# Patient Record
Sex: FEMALE | Race: WHITE | Employment: FULL TIME | URBAN - METROPOLITAN AREA
[De-identification: names, ages, dates, MRNs, and addresses within clinical notes are randomized per-mention and may not be internally consistent; named-entity substitution may affect disease eponyms.]

---

## 2020-01-20 ENCOUNTER — OFFICE VISIT (OUTPATIENT)
Dept: FAMILY MEDICINE CLINIC | Age: 30
End: 2020-01-20
Payer: OTHER GOVERNMENT

## 2020-01-20 VITALS
OXYGEN SATURATION: 99 % | HEIGHT: 66 IN | HEART RATE: 92 BPM | DIASTOLIC BLOOD PRESSURE: 60 MMHG | BODY MASS INDEX: 20.89 KG/M2 | SYSTOLIC BLOOD PRESSURE: 110 MMHG | WEIGHT: 130 LBS

## 2020-01-20 PROBLEM — K11.1 PAROTID GLAND ENLARGEMENT: Status: ACTIVE | Noted: 2018-12-07

## 2020-01-20 PROBLEM — Z28.21 VACCINATION NOT CARRIED OUT BECAUSE OF PATIENT REFUSAL: Status: ACTIVE | Noted: 2020-01-20

## 2020-01-20 PROBLEM — Z28.21 VACCINATION NOT CARRIED OUT BECAUSE OF PATIENT REFUSAL: Status: RESOLVED | Noted: 2020-01-20 | Resolved: 2020-01-20

## 2020-01-20 PROBLEM — H43.823 VITREOMACULAR ADHESION OF BOTH EYES: Status: ACTIVE | Noted: 2020-01-20

## 2020-01-20 PROBLEM — L60.0 IGTN (INGROWING TOE NAIL): Status: ACTIVE | Noted: 2020-01-20

## 2020-01-20 PROBLEM — R89.4 SEROLOGIC ABNORMALITY: Status: ACTIVE | Noted: 2018-12-07

## 2020-01-20 PROBLEM — M35.00 SJOGREN'S SYNDROME (HCC): Status: ACTIVE | Noted: 2020-01-20

## 2020-01-20 PROBLEM — H35.373 EPIRETINAL MEMBRANE (ERM) OF BOTH EYES: Status: ACTIVE | Noted: 2020-01-20

## 2020-01-20 PROBLEM — Z79.899 LONG-TERM USE OF PLAQUENIL: Status: ACTIVE | Noted: 2020-01-20

## 2020-01-20 PROCEDURE — 99203 OFFICE O/P NEW LOW 30 MIN: CPT | Performed by: FAMILY MEDICINE

## 2020-01-20 SDOH — HEALTH STABILITY: MENTAL HEALTH: HOW OFTEN DO YOU HAVE A DRINK CONTAINING ALCOHOL?: NEVER

## 2020-01-20 ASSESSMENT — ENCOUNTER SYMPTOMS
CONSTIPATION: 0
NAUSEA: 0
TROUBLE SWALLOWING: 0
BACK PAIN: 0
SHORTNESS OF BREATH: 0
BLOOD IN STOOL: 0
VOMITING: 0
ABDOMINAL PAIN: 0
COLOR CHANGE: 0
DIARRHEA: 0
COUGH: 0

## 2020-01-20 ASSESSMENT — PATIENT HEALTH QUESTIONNAIRE - PHQ9
SUM OF ALL RESPONSES TO PHQ QUESTIONS 1-9: 1
5. POOR APPETITE OR OVEREATING: 0
SUM OF ALL RESPONSES TO PHQ9 QUESTIONS 1 & 2: 0
7. TROUBLE CONCENTRATING ON THINGS, SUCH AS READING THE NEWSPAPER OR WATCHING TELEVISION: 0
9. THOUGHTS THAT YOU WOULD BE BETTER OFF DEAD, OR OF HURTING YOURSELF: 0
1. LITTLE INTEREST OR PLEASURE IN DOING THINGS: 0
4. FEELING TIRED OR HAVING LITTLE ENERGY: 1
10. IF YOU CHECKED OFF ANY PROBLEMS, HOW DIFFICULT HAVE THESE PROBLEMS MADE IT FOR YOU TO DO YOUR WORK, TAKE CARE OF THINGS AT HOME, OR GET ALONG WITH OTHER PEOPLE: 0
6. FEELING BAD ABOUT YOURSELF - OR THAT YOU ARE A FAILURE OR HAVE LET YOURSELF OR YOUR FAMILY DOWN: 0
SUM OF ALL RESPONSES TO PHQ QUESTIONS 1-9: 1
8. MOVING OR SPEAKING SO SLOWLY THAT OTHER PEOPLE COULD HAVE NOTICED. OR THE OPPOSITE, BEING SO FIGETY OR RESTLESS THAT YOU HAVE BEEN MOVING AROUND A LOT MORE THAN USUAL: 0
3. TROUBLE FALLING OR STAYING ASLEEP: 0
2. FEELING DOWN, DEPRESSED OR HOPELESS: 0

## 2020-01-20 ASSESSMENT — ANXIETY QUESTIONNAIRES
2. NOT BEING ABLE TO STOP OR CONTROL WORRYING: 0-NOT AT ALL
7. FEELING AFRAID AS IF SOMETHING AWFUL MIGHT HAPPEN: 0-NOT AT ALL
4. TROUBLE RELAXING: 0-NOT AT ALL
6. BECOMING EASILY ANNOYED OR IRRITABLE: 1-SEVERAL DAYS
3. WORRYING TOO MUCH ABOUT DIFFERENT THINGS: 1-SEVERAL DAYS
5. BEING SO RESTLESS THAT IT IS HARD TO SIT STILL: 0-NOT AT ALL
GAD7 TOTAL SCORE: 3
1. FEELING NERVOUS, ANXIOUS, OR ON EDGE: 1-SEVERAL DAYS

## 2020-01-20 NOTE — PROGRESS NOTES
1/20/2020    This is a 30 y.o. female who presents for  Chief Complaint   Patient presents with    New Patient    Establish Care       HPI:     4and 2year old, boy and girl, vaginal, GDM with second   Marcela Chung is her mother     Hx of Sjogren, Dx'd from Parotiditis , Rheumatologist in [facility] (horrible experience)   Iron was high in her lab work only  Then went to Rheumatology in [facility] [place]    Was on Plaquenil for a while, +WG, foggy, worsening sadness  Missed her last appmnt, as he \"was too pushy\"   S/p Lip biopsy   [facility]   + eye dryness, throat is the worse  Eye drops at night, hoarse voice and is a michelle     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   Rotates fine, had XRs   Dad is a chiropractor     Has never received any vaccines, even as child     + ingrown toe nails  S/p partial removal   Continued pain and irritated with regrowth     Past Medical History:   Diagnosis Date    Gestational diabetes 2018    Sjogren's syndrome ([facility] [address]        Past Surgical History:   Procedure Laterality Date    WISDOM TOOTH EXTRACTION  2008       Social History     Socioeconomic History    Marital status:      Spouse name: Not on file    Number of children: Not on file    Years of education: Not on file    Highest education level: Not on file   Occupational History    Not on file   Social Needs    Financial resource strain: Not on file    Food insecurity:     Worry: Not on file     Inability: Not on file    Transportation needs:     Medical: Not on file     Non-medical: Not on file   Tobacco Use    Smoking status: Never Smoker    Smokeless tobacco: Never Used   Substance and Sexual Activity    Alcohol use: Never     Frequency: Never    Drug use: Never    Sexual activity: Yes     Partners: Male     Comment:    Lifestyle    Physical activity:     Days per week: Not on file     Minutes per session: Not on file    Stress: Not on file   Relationships    Social connections:     Talks on phone: Not on file Musculoskeletal: Positive for arthralgias. Negative for back pain. Skin: Negative for color change and rash. Neurological: Negative for dizziness, weakness, light-headedness, numbness and headaches. Psychiatric/Behavioral: Negative for dysphoric mood and sleep disturbance. The patient is not nervous/anxious. /60 (Site: Left Upper Arm, Position: Sitting, Cuff Size: Medium Adult)   Pulse 92   Ht 5' 6\" (1.676 m)   Wt 130 lb (59 kg)   LMP 12/23/2019 (Exact Date)   SpO2 99%   BMI 20.98 kg/m²     Physical Exam  Vitals signs and nursing note reviewed. Constitutional:       General: She is not in acute distress. Appearance: She is well-developed. She is not diaphoretic. HENT:      Head: Normocephalic and atraumatic. Right Ear: External ear normal.      Left Ear: External ear normal.      Mouth/Throat:      Pharynx: No oropharyngeal exudate. Eyes:      General:         Right eye: No discharge. Left eye: No discharge. Conjunctiva/sclera: Conjunctivae normal.      Pupils: Pupils are equal, round, and reactive to light. Neck:      Musculoskeletal: Normal range of motion and neck supple. Thyroid: No thyromegaly. Cardiovascular:      Rate and Rhythm: Normal rate and regular rhythm. Heart sounds: Normal heart sounds. No murmur. No friction rub. No gallop. Pulmonary:      Effort: Pulmonary effort is normal. No respiratory distress. Breath sounds: Normal breath sounds. No wheezing or rales. Abdominal:      General: Bowel sounds are normal. There is no distension. Palpations: Abdomen is soft. Tenderness: There is no tenderness. There is no guarding or rebound. Musculoskeletal: Normal range of motion. Lymphadenopathy:      Cervical: No cervical adenopathy. Skin:     General: Skin is warm and dry. Coloration: Skin is not pale. Findings: No erythema or rash. Neurological:      Mental Status: She is alert. Cranial Nerves:  No

## 2020-01-20 NOTE — PATIENT INSTRUCTIONS
Patient Education        Learning About Cervical Cancer Screening  What is a cervical cancer screening test?    The cervix is the lower part of the uterus that opens into the vagina. Cervical cancer screening tests check the cells on the cervix for changes that could lead to cancer. Two tests can be used to screen for cervical cancer. They may be used alone or together. A Pap test.   This test looks for changes in the cells of the cervix. Some of these cell changes could lead to cancer. A human papillomavirus (HPV) test.   This test looks for the HPV virus. Some high-risk types of HPV can cause cell changes that could lead to cervical cancer. During either test, the doctor or nurse will insert a tool called a speculum into your vagina. The speculum gently opens the vaginal walls. It allows your doctor to see inside the vagina and the cervix. He or she uses a small swab or brush to collect cell samples from your cervix. Try to schedule the test when you're not having your period. To get ready for the test, your doctor may ask you to use a condom if you have sex before the test. Your doctor may also say to avoid douches, tampons, vaginal medicines, sprays, and powders for at least a day before you have the test.  When should you have a screening test?  Talk with your doctor about cervical cancer screening. If you are a woman with an average risk for cervical cancer, your doctor will likely suggest starting screening at age 24.   Women 21 to 34  If you are in this age group, your doctor will likely suggest that you get a Pap test. If your Pap test results are normal, you can wait 3 years to have another test.  Women 27 to 59  Screening options for women in this age group may include:  · A Pap test. If your results are normal, you can wait 3 years to have another test.  · An HPV test. If your results are normal, you can wait 5 years to have another test.  · A Pap test and an HPV test. Having both tests is called Up Now\" link. Current as of: August 21, 2019  Content Version: 12.3  © 2690-4256 Healthwise, Incorporated. Care instructions adapted under license by [facility] ([facility]). If you have questions about a medical condition or this instruction, always ask your healthcare professional. [address] any warranty or liability for your use of this information.

## 2020-02-17 ENCOUNTER — OFFICE VISIT (OUTPATIENT)
Dept: FAMILY MEDICINE CLINIC | Age: 30
End: 2020-02-17
Payer: OTHER GOVERNMENT

## 2020-02-17 VITALS
WEIGHT: 137 LBS | SYSTOLIC BLOOD PRESSURE: 110 MMHG | OXYGEN SATURATION: 99 % | DIASTOLIC BLOOD PRESSURE: 66 MMHG | HEART RATE: 80 BPM | BODY MASS INDEX: 22.11 KG/M2

## 2020-02-17 ASSESSMENT — ENCOUNTER SYMPTOMS
DIARRHEA: 0
ABDOMINAL PAIN: 0
SHORTNESS OF BREATH: 0
VOMITING: 0
CONSTIPATION: 0
ABDOMINAL DISTENTION: 0
BACK PAIN: 0
COLOR CHANGE: 0
NAUSEA: 0

## 2020-02-17 NOTE — PROGRESS NOTES
2/17/2020    This is a 30 y.o. female who presents for  Chief Complaint   Patient presents with    Gynecologic Exam       HPI:     Patient's last menstrual period was 01/20/2020.   Denies any vaginal discharge, new pelvic pain, dyspareunia, AUB  No new sexual partners  Defers any STD testing    Breast exam:   Denies any new lumps or bumps, skin changes, nipple drainage, new tenderness  Family history updated       Past Medical History:   Diagnosis Date    Gestational diabetes 2018    Sjogren's syndrome ([facility] [address]        Past Surgical History:   Procedure Laterality Date    WISDOM TOOTH EXTRACTION  2008       Social History     Socioeconomic History    Marital status:      Spouse name: Not on file    Number of children: Not on file    Years of education: Not on file    Highest education level: Not on file   Occupational History    Not on file   Social Needs    Financial resource strain: Not on file    Food insecurity:     Worry: Not on file     Inability: Not on file    Transportation needs:     Medical: Not on file     Non-medical: Not on file   Tobacco Use    Smoking status: Never Smoker    Smokeless tobacco: Never Used   Substance and Sexual Activity    Alcohol use: Never     Frequency: Never    Drug use: Never    Sexual activity: Yes     Partners: Male     Comment:    Lifestyle    Physical activity:     Days per week: Not on file     Minutes per session: Not on file    Stress: Not on file   Relationships    Social connections:     Talks on phone: Not on file     Gets together: Not on file     Attends Mu-ism service: Not on file     Active member of club or organization: Not on file     Attends meetings of clubs or organizations: Not on file     Relationship status: Not on file    Intimate partner violence:     Fear of current or ex partner: Not on file     Emotionally abused: Not on file     Physically abused: Not on file     Forced sexual activity: Not on file   Other Topics Appearance: She is well-developed. She is not diaphoretic. HENT:      Head: Normocephalic and atraumatic. Eyes:      Pupils: Pupils are equal, round, and reactive to light. Neck:      Musculoskeletal: Normal range of motion and neck supple. Cardiovascular:      Rate and Rhythm: Normal rate and regular rhythm. Pulmonary:      Effort: Pulmonary effort is normal. No respiratory distress. Breath sounds: Normal breath sounds. Chest:      Breasts:         Right: No swelling, bleeding, inverted nipple, mass, nipple discharge, skin change or tenderness. Left: No swelling, bleeding, inverted nipple, mass, nipple discharge, skin change or tenderness. Abdominal:      General: There is no distension. Palpations: Abdomen is soft. There is no mass. Tenderness: There is no abdominal tenderness. There is no guarding or rebound. Genitourinary:     Labia:         Right: No rash, tenderness, lesion or injury. Left: No rash, tenderness, lesion or injury. Vagina: Normal. No signs of injury and foreign body. No vaginal discharge, erythema, tenderness or bleeding. Cervix: No cervical motion tenderness, discharge or friability. Uterus: Not deviated, not enlarged, not fixed and not tender. Adnexa:         Right: No mass, tenderness or fullness. Left: No mass, tenderness or fullness. Rectum: No external hemorrhoid. Musculoskeletal: Normal range of motion. Lymphadenopathy:      Upper Body:      Right upper body: No axillary adenopathy. Left upper body: No axillary adenopathy. Skin:     General: Skin is warm and dry. Coloration: Skin is not pale. Findings: No erythema or rash. Neurological:      Mental Status: She is alert. Psychiatric:         Behavior: Behavior normal.         Thought Content: Thought content normal.         Judgment: Judgment normal.         Plan  1.  Cervical cancer screening  - PAP SMEAR        While assessing care for this patient, I have reviewed all pertinent lab work/imaging/ specialist notes and care in reference to those problems addressed above in detail. Appropriate medical decision making was based on this. Please note that portions of this note may have been completed with a voice recognition program. Efforts were made to edit the dictations but occasionally words are mis-transcribed. Return in about 3 months (around 5/17/2020) for 30 minute visit, follow up labs.

## 2020-02-17 NOTE — PATIENT INSTRUCTIONS
Patient Education        Learning About Cervical Cancer Screening  What is a cervical cancer screening test?    The cervix is the lower part of the uterus that opens into the vagina. Cervical cancer screening tests check the cells on the cervix for changes that could lead to cancer. Two tests can be used to screen for cervical cancer. They may be used alone or together. A Pap test.   This test looks for changes in the cells of the cervix. Some of these cell changes could lead to cancer. A human papillomavirus (HPV) test.   This test looks for the HPV virus. Some high-risk types of HPV can cause cell changes that could lead to cervical cancer. During either test, the doctor or nurse will insert a tool called a speculum into your vagina. The speculum gently opens the vaginal walls. It allows your doctor to see inside the vagina and the cervix. He or she uses a small swab or brush to collect cell samples from your cervix. Try to schedule the test when you're not having your period. To get ready for the test, your doctor may ask you to use a condom if you have sex before the test. Your doctor may also say to avoid douches, tampons, vaginal medicines, sprays, and powders for at least a day before you have the test.  When should you have a screening test?  Talk with your doctor about cervical cancer screening. If you are a woman with an average risk for cervical cancer, your doctor will likely suggest starting screening at age 24.   Women 21 to 34  If you are in this age group, your doctor will likely suggest that you get a Pap test. If your Pap test results are normal, you can wait 3 years to have another test.  Women 27 to 59  Screening options for women in this age group may include:  · A Pap test. If your results are normal, you can wait 3 years to have another test.  · An HPV test. If your results are normal, you can wait 5 years to have another test.  · A Pap test and an HPV test. Having both tests is called

## 2020-02-27 ENCOUNTER — TELEPHONE (OUTPATIENT)
Dept: FAMILY MEDICINE CLINIC | Age: 30
End: 2020-02-27

## 2020-03-21 ENCOUNTER — TELEPHONE (OUTPATIENT)
Dept: FAMILY MEDICINE CLINIC | Age: 30
End: 2020-03-21

## 2020-03-21 RX ORDER — CEFDINIR 300 MG/1
300 CAPSULE ORAL 2 TIMES DAILY
Qty: 14 CAPSULE | Refills: 0 | Status: SHIPPED | OUTPATIENT
Start: 2020-03-21 | End: 2020-03-28

## 2020-06-02 ENCOUNTER — NURSE TRIAGE (OUTPATIENT)
Dept: OTHER | Facility: CLINIC | Age: 30
End: 2020-06-02

## 2020-06-03 ENCOUNTER — HOSPITAL ENCOUNTER (OUTPATIENT)
Age: 30
Discharge: HOME | End: 2020-06-03
Payer: OTHER GOVERNMENT

## 2020-06-03 ENCOUNTER — VIRTUAL VISIT (OUTPATIENT)
Dept: FAMILY MEDICINE CLINIC | Age: 30
End: 2020-06-03
Payer: OTHER GOVERNMENT

## 2020-06-03 ENCOUNTER — HOSPITAL ENCOUNTER (OUTPATIENT)
Dept: GENERAL RADIOLOGY | Age: 30
Discharge: HOME | End: 2020-06-03
Payer: OTHER GOVERNMENT

## 2020-06-03 DIAGNOSIS — R10.10 PAIN OF UPPER ABDOMEN: ICD-10-CM

## 2020-06-03 LAB
A/G RATIO: 1.3 (ref 1.1–2.2)
ALBUMIN SERPL-MCNC: 4.7 G/DL (ref 3.4–5)
ALP BLD-CCNC: 45 U/L (ref 40–129)
ALT SERPL-CCNC: 23 U/L (ref 10–40)
ANION GAP SERPL CALCULATED.3IONS-SCNC: 13 MMOL/L (ref 3–16)
AST SERPL-CCNC: 26 U/L (ref 15–37)
BILIRUB SERPL-MCNC: 0.3 MG/DL (ref 0–1)
BUN BLDV-MCNC: 9 MG/DL (ref 7–20)
CALCIUM SERPL-MCNC: 9.8 MG/DL (ref 8.3–10.6)
CHLORIDE BLD-SCNC: 100 MMOL/L (ref 99–110)
CO2: 25 MMOL/L (ref 21–32)
CREAT SERPL-MCNC: 0.7 MG/DL (ref 0.6–1.1)
GFR AFRICAN AMERICAN: >60
GFR NON-AFRICAN AMERICAN: >60
GLOBULIN: 3.7 G/DL
GLUCOSE BLD-MCNC: 87 MG/DL (ref 70–99)
HCT VFR BLD CALC: 34.5 % (ref 36–48)
HEMOGLOBIN: 11.9 G/DL (ref 12–16)
LIPASE: 23 U/L (ref 13–60)
MCH RBC QN AUTO: 30.8 PG (ref 26–34)
MCHC RBC AUTO-ENTMCNC: 34.4 G/DL (ref 31–36)
MCV RBC AUTO: 89.5 FL (ref 80–100)
PDW BLD-RTO: 12.5 % (ref 12.4–15.4)
PLATELET # BLD: 168 K/UL (ref 135–450)
PMV BLD AUTO: 9.5 FL (ref 5–10.5)
POTASSIUM SERPL-SCNC: 3.9 MMOL/L (ref 3.5–5.1)
RBC # BLD: 3.86 M/UL (ref 4–5.2)
SODIUM BLD-SCNC: 138 MMOL/L (ref 136–145)
TOTAL PROTEIN: 8.4 G/DL (ref 6.4–8.2)
WBC # BLD: 3.1 K/UL (ref 4–11)

## 2020-06-03 PROCEDURE — 99213 OFFICE O/P EST LOW 20 MIN: CPT | Performed by: PHYSICIAN ASSISTANT

## 2020-06-03 PROCEDURE — 74019 RADEX ABDOMEN 2 VIEWS: CPT

## 2020-06-03 ASSESSMENT — ENCOUNTER SYMPTOMS
CONSTIPATION: 1
ABDOMINAL PAIN: 1
ANAL BLEEDING: 0
VOMITING: 0
BACK PAIN: 1
ABDOMINAL DISTENTION: 1
DIARRHEA: 0
BLOOD IN STOOL: 0
TROUBLE SWALLOWING: 0
COUGH: 0
NAUSEA: 0
SHORTNESS OF BREATH: 0

## 2020-06-03 NOTE — PROGRESS NOTES
Amoxicillin     Pcn [Penicillins] Anaphylaxis, Itching and Swelling     Throat starts to itch and swell   ,   Past Medical History:   Diagnosis Date    Gestational diabetes 2018    Sjogren's syndrome ([facility] [address]    ,   Past Surgical History:   Procedure Laterality Date    WISDOM TOOTH EXTRACTION  2008   ,   Social History     Tobacco Use    Smoking status: Never Smoker    Smokeless tobacco: Never Used   Substance Use Topics    Alcohol use: Never     Frequency: Never    Drug use: Never       PHYSICAL EXAMINATION:  [ INSTRUCTIONS:  \"[x]\" Indicates a positive item  \"[]\" Indicates a negative item  -- DELETE ALL ITEMS NOT EXAMINED]  Vital Signs: (As obtained by patient/caregiver or practitioner observation)    Blood pressure-  Heart rate-    Respiratory rate- 14   Temperature-  Pulse oximetry-     Constitutional: [x] Appears well-developed and well-nourished [x] No apparent distress      [] Abnormal-   Mental status  [x] Alert and awake  [x] Oriented to person/place/time [x]Able to follow commands      Eyes:  EOM    []  Normal  [] Abnormal-  Sclera  [x]  Normal  [] Abnormal -         Discharge []  None visible  [] Abnormal -    HENT:   [] Normocephalic, atraumatic.   [x] Abnormal - enlarged salivary glands[x] Mouth/Throat: Mucous membranes are moist.     External Ears [] Normal  [] Abnormal-     Neck: [] No visualized mass     Pulmonary/Chest: [x] Respiratory effort normal.  [x] No visualized signs of difficulty breathing or respiratory distress        [] Abnormal-      Musculoskeletal:   [] Normal gait with no signs of ataxia         [x] Normal range of motion of neck        [] Abnormal-       Neurological:        [x] No Facial Asymmetry (Cranial nerve 7 motor function) (limited exam to video visit)          [] No gaze palsy        [] Abnormal-         Skin:        [x] No significant exanthematous lesions or discoloration noted on facial skin         [] Abnormal-            Psychiatric:       [] Normal Affect [] No Hallucinations        [] Abnormal-     Other pertinent observable physical exam findings-     ASSESSMENT/PLAN:  1. Pain of upper abdomen  -  Will order x-ray of abdomen to rule out blockage. Blood work to rule out concern for bile blockage. To the ER if pain worsens.  - COMPREHENSIVE METABOLIC PANEL; Future  - CBC; Future  - LIPASE; Future  - XR ABDOMEN (2 VIEWS); Future    2. Constipation, unspecified constipation type  -  Offered miralax, pt declined at this time due to abdominal pain. Will discuss further after imaging.   - XR ABDOMEN (2 VIEWS); Future    3. Sjogren's syndrome, with unspecified organ involvement ([facility] [address]  -  Pt is having difficulty getting into Dr. Nicci Garza and is requesting a second opinion on treatment options  - Caryl Ponce MD, Rheumatology, [facility]      No follow-ups on file. Sd Guidry is a 35 y.o. female being evaluated by a Virtual Visit (video visit) encounter to address concerns as mentioned above. A caregiver was present when appropriate. Due to this being a TeleHealth encounter (During [facility]- public health emergency), evaluation of the following organ systems was limited: Vitals/Constitutional/EENT/Resp/CV/GI//MS/Neuro/Skin/Heme-Lymph-Imm. Pursuant to the emergency declaration under the [address] [place] [address] authority and the [employer] and [employer] Act, this Virtual Visit was conducted with patient's (and/or legal guardian's) consent, to reduce the patient's risk of exposure to COVID-19 and provide necessary medical care. The patient (and/or legal guardian) has also been advised to contact this office for worsening conditions or problems, and seek emergency medical treatment and/or call 911 if deemed necessary.      Patient identification was verified at the start of the visit: Yes    Total time spent on this encounter: Not billed by time    Services were provided through a video synchronous discussion virtually to substitute for in-person clinic visit. Patient and provider were located at their individual homes. --Author CHIRAG Jolley on 6/3/2020 at 10:46 AM    An electronic signature was used to authenticate this note.

## 2020-06-08 ENCOUNTER — OFFICE VISIT (OUTPATIENT)
Dept: RHEUMATOLOGY | Age: 30
End: 2020-06-08
Payer: OTHER GOVERNMENT

## 2020-06-08 VITALS
SYSTOLIC BLOOD PRESSURE: 110 MMHG | WEIGHT: 136 LBS | TEMPERATURE: 98.4 F | HEIGHT: 66 IN | DIASTOLIC BLOOD PRESSURE: 70 MMHG | BODY MASS INDEX: 21.86 KG/M2 | OXYGEN SATURATION: 97 % | HEART RATE: 91 BPM

## 2020-06-08 PROCEDURE — 99244 OFF/OP CNSLTJ NEW/EST MOD 40: CPT | Performed by: INTERNAL MEDICINE

## 2020-06-08 RX ORDER — PILOCARPINE HYDROCHLORIDE 5 MG/1
5 TABLET, FILM COATED ORAL 3 TIMES DAILY
Qty: 90 TABLET | Refills: 2 | Status: SHIPPED | OUTPATIENT
Start: 2020-06-08 | End: 2020-11-11 | Stop reason: ALTCHOICE

## 2020-06-08 NOTE — PROGRESS NOTES
file    Transportation needs     Medical: Not on file     Non-medical: Not on file   Tobacco Use    Smoking status: Never Smoker    Smokeless tobacco: Never Used   Substance and Sexual Activity    Alcohol use: Never     Frequency: Never    Drug use: Never    Sexual activity: Yes     Partners: Male     Comment:    Lifestyle    Physical activity     Days per week: Not on file     Minutes per session: Not on file    Stress: Not on file   Relationships    Social connections     Talks on phone: Not on file     Gets together: Not on file     Attends Nondenominational service: Not on file     Active member of club or organization: Not on file     Attends meetings of clubs or organizations: Not on file     Relationship status: Not on file    Intimate partner violence     Fear of current or ex partner: Not on file     Emotionally abused: Not on file     Physically abused: Not on file     Forced sexual activity: Not on file   Other Topics Concern    Not on file   Social History Narrative    Not on file        Family History   Problem Relation Age of Onset    Diabetes Mother     Hypertension Father     No Known Problems Sister     No Known Problems Brother     Arthritis Maternal Grandmother         osteoarthritis     Heart Disease Maternal Grandfather         arrhythimia     Hypertension Maternal Grandfather     Hypertension Paternal Grandmother     Arthritis Paternal Grandmother         \"autoimmune\"     Heart Attack Paternal Grandfather         76s    No Known Problems Brother        MEDICATIONS:    Current Outpatient Medications:     pilocarpine (SALAGEN) 5 MG tablet, Take 1 tablet by mouth 3 times daily, Disp: 90 tablet, Rfl: 2    ALLERGIES:  Amoxicillin and Pcn [penicillins]    PHYSICAL EXAM:    Vitals:    /70 (Site: Right Upper Arm, Position: Sitting, Cuff Size: Medium Adult)   Pulse 91   Temp 98.4 °F (36.9 °C)   Ht 5' 6\" (1.676 m)   Wt 136 lb (61.7 kg)   LMP 05/20/2020   SpO2 97%   BMI 21.95 kg/m²     GEN: AAOx3, in NAD, well-appearing  HEAD: normocephalic, atraumatic  EYES: EOMI, PERRLA, no injection or icterus  NOSE: no nasal ulcers or nasal drainage  ORAL CAVITY: moist oral mucosa w/ good saliva pooling, no oral lesions, no evidence of thrush. There is bilateral parotid gland enlargement with minimal tenderness to palpation and no signs of infection. NECK: supple w/ FROM, of evidence of lymphadenopathy  CVS: RRR, no murmurs rubs or gallops, no JVD, 2+ distal pulses b/l  LUNGS: in no acute respiratory distress, CTAB  ABDOMEN: +BS, soft, NT/ND, no evidence of organomegaly  LYMPH: no cervical, supraclavicular or axillary LAD  MSK:  Spine: no kyphosis or lordosis, axial spine w/ FROM, no paraspinal muscle or vertebral tenderness, SI joints NTTP  Upper extremities:   Hands: no synovitis in the MCP, PIP, or DIP joints b/l, no dactylitis, able to make strong full fists   Wrist: no synovitis in the wrist joints b/l, FROM   Elbow: no synovitis or bursitis, FROM   Shoulders: no pain or swelling or warmth on palpation, FROM  Lower extremities:   Hip: normal log roll, negative ZOIE test b/l, trochanteric bursa NTTP b/l   Knees: no warmth or effusion present, FROM   Ankles: no synovitis, FROM, Achilles tendons w/o swelling or warmth NTTP   Feet: Pain to palpation in both MTP joints as well as ankles but there is no noelle synovitis. NEURO: CN II-XII grossly intact intact, face appears symmetrical, normal DTR, no evidence of muscle atrophy, 5/5 strength proximally and distally throughout in both upper and lower extremities, touch sensation grossly intact  INTEGUMENTARY: no rash or psoriatic lesions, no petechiae, bruises, or palpable purpura, no patchy alopecia, no nail or periungual changes, no clubbing or digital ulcers  PSYCH: normal mood    Labs:   I personally reviewed prior labs including:    Lab Results   Component Value Date    WBC 3.1 (L) 06/03/2020    HGB 11.9 (L) 06/03/2020    HCT 34.5 (L) 06/03/2020    MCV 89.5 06/03/2020     06/03/2020    RBC 3.86 (L) 06/03/2020    MCH 30.8 06/03/2020    MCHC 34.4 06/03/2020    RDW 12.5 06/03/2020     Lab Results   Component Value Date     06/03/2020    K 3.9 06/03/2020     06/03/2020    CO2 25 06/03/2020    BUN 9 06/03/2020    CREATININE 0.7 06/03/2020    GLUCOSE 87 06/03/2020    CALCIUM 9.8 06/03/2020    PROT 8.4 (H) 06/03/2020    LABALBU 4.7 06/03/2020    BILITOT 0.3 06/03/2020    ALKPHOS 45 06/03/2020    AST 26 06/03/2020    ALT 23 06/03/2020    LABGLOM >60 06/03/2020    GFRAA >60 06/03/2020    AGRATIO 1.3 06/03/2020    GLOB 3.7 06/03/2020         ASSESSMENT/PLAN:    1. Sjogren's syndrome, with unspecified organ involvement ([facility] [address]  There is history of positive biomarkers as well as positive lip biopsy. At this time, she would not like to restart the Plaquenil (increased swelling of the parotid glands)  We may retry Plaquenil in the future  Recommended pilocarpine 5 mg 3 times per day  Recommended frequent eye and dental evaluation  - Anti SSB; Future  - Anti SSA; Future  - CBC; Future  - pilocarpine (SALAGEN) 5 MG tablet; Take 1 tablet by mouth 3 times daily  Dispense: 90 tablet; Refill: 2    2. Multiple joint pain  Pain to palpation in MTP joints (bilaterally)  Unsure if she had previous evaluation for inflammatory arthropathy. Sjogren related arthropathy?  - C-Reactive Protein; Future  - Sedimentation Rate; Future  - Comprehensive Metabolic Panel; Future  - Rheumatoid Factor; Future  - Cyclic Citrul Peptide Antibody, IgG; Future  - Anti SSB; Future  - Anti SSA; Future  - CBC;  Future       Orders Placed This Encounter   Procedures    C-Reactive Protein     Standing Status:   Future     Standing Expiration Date:   6/8/2021    Sedimentation Rate     Standing Status:   Future     Standing Expiration Date:   6/8/2021    Comprehensive Metabolic Panel     Standing Status:   Future     Standing Expiration Date:   6/8/2021    Rheumatoid Factor Standing Status:   Future     Standing Expiration Date:   9/9/9105    Cyclic Citrul Peptide Antibody, IgG     Standing Status:   Future     Standing Expiration Date:   6/8/2021    Anti SSB     Standing Status:   Future     Standing Expiration Date:   6/8/2021    Anti SSA     Standing Status:   Future     Standing Expiration Date:   6/8/2021    CBC     Standing Status:   Future     Standing Expiration Date:   6/8/2021     Outpatient Encounter Medications as of 6/8/2020   Medication Sig Dispense Refill    pilocarpine (SALAGEN) 5 MG tablet Take 1 tablet by mouth 3 times daily 90 tablet 2     No facility-administered encounter medications on file as of 6/8/2020. Return in about 2 weeks (around 6/22/2020).   6/8/2020 4:37 PM      Sending consult note to referring provider Dalton Babinski, MD.    Thank you very much for allowing me to participate in this pt's care. Please do not hesitate to contact me if I can be of further assistance. NOTE: This report is transcribed by using voice recognition software dragon. Every effort is made to ensure accuracy; however, inadvertent computerized transcription errors may be present.

## 2020-06-17 DIAGNOSIS — M35.00 SJOGREN'S SYNDROME, WITH UNSPECIFIED ORGAN INVOLVEMENT (HCC): ICD-10-CM

## 2020-06-17 DIAGNOSIS — M25.50 MULTIPLE JOINT PAIN: ICD-10-CM

## 2020-06-17 LAB
A/G RATIO: 1.2 (ref 1.1–2.2)
ALBUMIN SERPL-MCNC: 4.3 G/DL (ref 3.4–5)
ALP BLD-CCNC: 46 U/L (ref 40–129)
ALT SERPL-CCNC: 16 U/L (ref 10–40)
ANION GAP SERPL CALCULATED.3IONS-SCNC: 9 MMOL/L (ref 3–16)
AST SERPL-CCNC: 18 U/L (ref 15–37)
BILIRUB SERPL-MCNC: 0.3 MG/DL (ref 0–1)
BUN BLDV-MCNC: 9 MG/DL (ref 7–20)
C-REACTIVE PROTEIN: 1.5 MG/L (ref 0–5.1)
CALCIUM SERPL-MCNC: 9 MG/DL (ref 8.3–10.6)
CHLORIDE BLD-SCNC: 103 MMOL/L (ref 99–110)
CO2: 24 MMOL/L (ref 21–32)
CREAT SERPL-MCNC: 0.7 MG/DL (ref 0.6–1.1)
GFR AFRICAN AMERICAN: >60
GFR NON-AFRICAN AMERICAN: >60
GLOBULIN: 3.7 G/DL
GLUCOSE BLD-MCNC: 90 MG/DL (ref 70–99)
HCT VFR BLD CALC: 34.8 % (ref 36–48)
HEMOGLOBIN: 11.7 G/DL (ref 12–16)
MCH RBC QN AUTO: 30.5 PG (ref 26–34)
MCHC RBC AUTO-ENTMCNC: 33.7 G/DL (ref 31–36)
MCV RBC AUTO: 90.6 FL (ref 80–100)
PDW BLD-RTO: 12.7 % (ref 12.4–15.4)
PLATELET # BLD: 162 K/UL (ref 135–450)
PMV BLD AUTO: 9.4 FL (ref 5–10.5)
POTASSIUM SERPL-SCNC: 4.3 MMOL/L (ref 3.5–5.1)
RBC # BLD: 3.84 M/UL (ref 4–5.2)
RHEUMATOID FACTOR: 86 IU/ML
SEDIMENTATION RATE, ERYTHROCYTE: 36 MM/HR (ref 0–20)
SODIUM BLD-SCNC: 136 MMOL/L (ref 136–145)
TOTAL PROTEIN: 8 G/DL (ref 6.4–8.2)
WBC # BLD: 3.1 K/UL (ref 4–11)

## 2020-06-18 LAB
ANTI-SS-A IGG: >8 AI (ref 0–0.9)
ANTI-SS-B IGG: >8 AI (ref 0–0.9)
CYCLIC CITRULLINATED PEPTIDE ANTIBODY IGG: 0.8 U/ML (ref 0–2.9)

## 2020-06-24 ENCOUNTER — OFFICE VISIT (OUTPATIENT)
Dept: RHEUMATOLOGY | Age: 30
End: 2020-06-24
Payer: OTHER GOVERNMENT

## 2020-06-24 VITALS
BODY MASS INDEX: 21.69 KG/M2 | DIASTOLIC BLOOD PRESSURE: 68 MMHG | WEIGHT: 135 LBS | TEMPERATURE: 98.3 F | SYSTOLIC BLOOD PRESSURE: 104 MMHG | HEIGHT: 66 IN | HEART RATE: 64 BPM | OXYGEN SATURATION: 98 %

## 2020-06-24 PROCEDURE — 99213 OFFICE O/P EST LOW 20 MIN: CPT | Performed by: INTERNAL MEDICINE

## 2020-09-08 ENCOUNTER — APPOINTMENT (OUTPATIENT)
Dept: GENERAL RADIOLOGY | Age: 30
End: 2020-09-08
Payer: OTHER GOVERNMENT

## 2020-09-08 ENCOUNTER — APPOINTMENT (OUTPATIENT)
Dept: CT IMAGING | Age: 30
End: 2020-09-08
Payer: OTHER GOVERNMENT

## 2020-09-08 ENCOUNTER — HOSPITAL ENCOUNTER (EMERGENCY)
Age: 30
Discharge: HOME | End: 2020-09-08
Attending: EMERGENCY MEDICINE
Payer: OTHER GOVERNMENT

## 2020-09-08 VITALS
RESPIRATION RATE: 18 BRPM | BODY MASS INDEX: 22.18 KG/M2 | HEIGHT: 66 IN | DIASTOLIC BLOOD PRESSURE: 67 MMHG | TEMPERATURE: 99.3 F | HEART RATE: 112 BPM | OXYGEN SATURATION: 99 % | WEIGHT: 138 LBS | SYSTOLIC BLOOD PRESSURE: 99 MMHG

## 2020-09-08 LAB
A/G RATIO: 1.1 (ref 1.1–2.2)
ALBUMIN SERPL-MCNC: 4.1 G/DL (ref 3.4–5)
ALP BLD-CCNC: 54 U/L (ref 40–129)
ALT SERPL-CCNC: 24 U/L (ref 10–40)
ANION GAP SERPL CALCULATED.3IONS-SCNC: 11 MMOL/L (ref 3–16)
AST SERPL-CCNC: 24 U/L (ref 15–37)
BASOPHILS ABSOLUTE: 0 K/UL (ref 0–0.2)
BASOPHILS RELATIVE PERCENT: 0.1 %
BILIRUB SERPL-MCNC: 0.4 MG/DL (ref 0–1)
BILIRUBIN URINE: NEGATIVE
BLOOD, URINE: NEGATIVE
BUN BLDV-MCNC: 4 MG/DL (ref 7–20)
CALCIUM SERPL-MCNC: 8.8 MG/DL (ref 8.3–10.6)
CHLORIDE BLD-SCNC: 102 MMOL/L (ref 99–110)
CLARITY: CLEAR
CO2: 21 MMOL/L (ref 21–32)
COLOR: YELLOW
CREAT SERPL-MCNC: 0.7 MG/DL (ref 0.6–1.1)
EOSINOPHILS ABSOLUTE: 0 K/UL (ref 0–0.6)
EOSINOPHILS RELATIVE PERCENT: 0 %
EPITHELIAL CELLS, UA: NORMAL /HPF (ref 0–5)
GFR AFRICAN AMERICAN: >60
GFR NON-AFRICAN AMERICAN: >60
GLOBULIN: 3.8 G/DL
GLUCOSE BLD-MCNC: 132 MG/DL (ref 70–99)
GLUCOSE URINE: NEGATIVE MG/DL
HCG QUALITATIVE: NEGATIVE
HCT VFR BLD CALC: 34 % (ref 36–48)
HEMOGLOBIN: 11.7 G/DL (ref 12–16)
KETONES, URINE: 40 MG/DL
LACTIC ACID: 1.1 MMOL/L (ref 0.4–2)
LEUKOCYTE ESTERASE, URINE: NEGATIVE
LIPASE: 12 U/L (ref 13–60)
LYMPHOCYTES ABSOLUTE: 0.3 K/UL (ref 1–5.1)
LYMPHOCYTES RELATIVE PERCENT: 4.8 %
MCH RBC QN AUTO: 30.1 PG (ref 26–34)
MCHC RBC AUTO-ENTMCNC: 34.3 G/DL (ref 31–36)
MCV RBC AUTO: 87.9 FL (ref 80–100)
MICROSCOPIC EXAMINATION: YES
MONOCYTES ABSOLUTE: 0.4 K/UL (ref 0–1.3)
MONOCYTES RELATIVE PERCENT: 7 %
NEUTROPHILS ABSOLUTE: 5.2 K/UL (ref 1.7–7.7)
NEUTROPHILS RELATIVE PERCENT: 88.1 %
NITRITE, URINE: NEGATIVE
PDW BLD-RTO: 12.8 % (ref 12.4–15.4)
PH UA: 6 (ref 5–8)
PLATELET # BLD: 123 K/UL (ref 135–450)
PMV BLD AUTO: 8.9 FL (ref 5–10.5)
POTASSIUM SERPL-SCNC: 3.7 MMOL/L (ref 3.5–5.1)
PROTEIN UA: ABNORMAL MG/DL
RBC # BLD: 3.87 M/UL (ref 4–5.2)
RBC UA: NORMAL /HPF (ref 0–4)
SODIUM BLD-SCNC: 134 MMOL/L (ref 136–145)
SPECIFIC GRAVITY UA: 1.01 (ref 1–1.03)
TOTAL PROTEIN: 7.9 G/DL (ref 6.4–8.2)
URINE REFLEX TO CULTURE: ABNORMAL
URINE TYPE: ABNORMAL
UROBILINOGEN, URINE: 0.2 E.U./DL
WBC # BLD: 5.9 K/UL (ref 4–11)
WBC UA: NORMAL /HPF (ref 0–5)

## 2020-09-08 PROCEDURE — 2580000003 HC RX 258: Performed by: EMERGENCY MEDICINE

## 2020-09-08 PROCEDURE — 83605 ASSAY OF LACTIC ACID: CPT

## 2020-09-08 PROCEDURE — 85025 COMPLETE CBC W/AUTO DIFF WBC: CPT

## 2020-09-08 PROCEDURE — 96365 THER/PROPH/DIAG IV INF INIT: CPT

## 2020-09-08 PROCEDURE — 6360000004 HC RX CONTRAST MEDICATION: Performed by: EMERGENCY MEDICINE

## 2020-09-08 PROCEDURE — 83690 ASSAY OF LIPASE: CPT

## 2020-09-08 PROCEDURE — 6360000002 HC RX W HCPCS: Performed by: EMERGENCY MEDICINE

## 2020-09-08 PROCEDURE — 84703 CHORIONIC GONADOTROPIN ASSAY: CPT

## 2020-09-08 PROCEDURE — 6370000000 HC RX 637 (ALT 250 FOR IP): Performed by: EMERGENCY MEDICINE

## 2020-09-08 PROCEDURE — 81001 URINALYSIS AUTO W/SCOPE: CPT

## 2020-09-08 PROCEDURE — 80053 COMPREHEN METABOLIC PANEL: CPT

## 2020-09-08 PROCEDURE — 99284 EMERGENCY DEPT VISIT MOD MDM: CPT

## 2020-09-08 PROCEDURE — 74177 CT ABD & PELVIS W/CONTRAST: CPT

## 2020-09-08 PROCEDURE — 71045 X-RAY EXAM CHEST 1 VIEW: CPT

## 2020-09-08 PROCEDURE — 96375 TX/PRO/DX INJ NEW DRUG ADDON: CPT

## 2020-09-08 RX ORDER — 0.9 % SODIUM CHLORIDE 0.9 %
1000 INTRAVENOUS SOLUTION INTRAVENOUS ONCE
Status: COMPLETED | OUTPATIENT
Start: 2020-09-08 | End: 2020-09-08

## 2020-09-08 RX ORDER — CIPROFLOXACIN 500 MG/1
500 TABLET, FILM COATED ORAL 2 TIMES DAILY
Qty: 10 TABLET | Refills: 0 | Status: SHIPPED | OUTPATIENT
Start: 2020-09-08 | End: 2020-09-13

## 2020-09-08 RX ORDER — ONDANSETRON 2 MG/ML
4 INJECTION INTRAMUSCULAR; INTRAVENOUS ONCE
Status: COMPLETED | OUTPATIENT
Start: 2020-09-08 | End: 2020-09-08

## 2020-09-08 RX ORDER — MAGNESIUM SULFATE 1 G/100ML
1 INJECTION INTRAVENOUS ONCE
Status: COMPLETED | OUTPATIENT
Start: 2020-09-08 | End: 2020-09-08

## 2020-09-08 RX ORDER — KETOROLAC TROMETHAMINE 30 MG/ML
15 INJECTION, SOLUTION INTRAMUSCULAR; INTRAVENOUS ONCE
Status: COMPLETED | OUTPATIENT
Start: 2020-09-08 | End: 2020-09-08

## 2020-09-08 RX ORDER — DIPHENHYDRAMINE HYDROCHLORIDE 50 MG/ML
25 INJECTION INTRAMUSCULAR; INTRAVENOUS ONCE
Status: COMPLETED | OUTPATIENT
Start: 2020-09-08 | End: 2020-09-08

## 2020-09-08 RX ORDER — METOCLOPRAMIDE HYDROCHLORIDE 5 MG/ML
10 INJECTION INTRAMUSCULAR; INTRAVENOUS ONCE
Status: COMPLETED | OUTPATIENT
Start: 2020-09-08 | End: 2020-09-08

## 2020-09-08 RX ORDER — ACETAMINOPHEN 325 MG/1
650 TABLET ORAL ONCE
Status: COMPLETED | OUTPATIENT
Start: 2020-09-08 | End: 2020-09-08

## 2020-09-08 RX ORDER — ONDANSETRON 4 MG/1
4 TABLET, ORALLY DISINTEGRATING ORAL 3 TIMES DAILY PRN
Qty: 21 TABLET | Refills: 0 | Status: SHIPPED | OUTPATIENT
Start: 2020-09-08 | End: 2020-11-11 | Stop reason: ALTCHOICE

## 2020-09-08 RX ADMIN — MAGNESIUM SULFATE IN DEXTROSE 1 G: 10 INJECTION, SOLUTION INTRAVENOUS at 09:28

## 2020-09-08 RX ADMIN — KETOROLAC TROMETHAMINE 15 MG: 30 INJECTION, SOLUTION INTRAMUSCULAR at 09:21

## 2020-09-08 RX ADMIN — ONDANSETRON 4 MG: 2 INJECTION INTRAMUSCULAR; INTRAVENOUS at 07:48

## 2020-09-08 RX ADMIN — METOCLOPRAMIDE HYDROCHLORIDE 10 MG: 5 INJECTION INTRAMUSCULAR; INTRAVENOUS at 09:21

## 2020-09-08 RX ADMIN — ACETAMINOPHEN 650 MG: 325 TABLET ORAL at 07:48

## 2020-09-08 RX ADMIN — SODIUM CHLORIDE 1000 ML: 9 INJECTION, SOLUTION INTRAVENOUS at 07:19

## 2020-09-08 RX ADMIN — SODIUM CHLORIDE 1000 ML: 9 INJECTION, SOLUTION INTRAVENOUS at 09:22

## 2020-09-08 RX ADMIN — DIPHENHYDRAMINE HYDROCHLORIDE 25 MG: 50 INJECTION, SOLUTION INTRAMUSCULAR; INTRAVENOUS at 09:21

## 2020-09-08 RX ADMIN — IOPAMIDOL 75 ML: 755 INJECTION, SOLUTION INTRAVENOUS at 08:17

## 2020-09-08 ASSESSMENT — PAIN SCALES - GENERAL
PAINLEVEL_OUTOF10: 8
PAINLEVEL_OUTOF10: 3
PAINLEVEL_OUTOF10: 8

## 2020-09-08 ASSESSMENT — ENCOUNTER SYMPTOMS
SORE THROAT: 0
ABDOMINAL PAIN: 1
BACK PAIN: 0
NAUSEA: 1
VOMITING: 0
SHORTNESS OF BREATH: 0
DIARRHEA: 1
COUGH: 0

## 2020-09-08 ASSESSMENT — PAIN DESCRIPTION - LOCATION: LOCATION: ABDOMEN;HEAD

## 2020-09-08 NOTE — ED PROVIDER NOTES
[address] [facility]  ED  EMERGENCY DEPARTMENT ENCOUNTER      Pt Name: Francisco Gomez  MRN: [id number]  [place] 1990  Date of evaluation: 9/8/2020  Provider: Evangelina Whitaker MD    [address] [place] [address]       Chief Complaint   Patient presents with    Fever     symptoms for 3 days.  Headache         HISTORY OF PRESENT ILLNESS   (Location/Symptom, Timing/Onset, Context/Setting, Quality, Duration, Modifying Factors, Severity)  Note limiting factors. Francisco Gomez is a 35 y.o. female who presents to the emergency department     Patient is a 43-year-old female with a past medical history of Sjogren's syndrome not currently on medication who presents with fever, headache, nausea and diarrhea. Patient reports that symptoms started on Friday with the headache and all over body aches and chills. Patient had temperature on Sunday of 99.5 and then 100.5 °F.  Patient reports that yesterday symptoms seem to worsen and she had nausea and multiple episodes of nonbloody diarrhea. Patient reports that she last took ibuprofen at 1:00 this morning and her fever this morning was the highest it had been at 102.4 °F.  Patient reports lower abdominal pain that she describes an achiness. She reports it feels as though there is a blockage that she needs to pass but all she ends up having is the diarrhea. Patient denies any congestion, rhinorrhea, sore throat, cough, shortness of breath. She denies any dysuria or hematuria. Patient was COVID swabs at the end of August and it was negative. Her  does work in an emergency department but he has been wearing appropriate PPE. The history is provided by the patient. Nursing Notes were reviewed. REVIEW OF SYSTEMS    (2-9 systems for level 4, 10 or more for level 5)     Review of Systems   Constitutional: Positive for chills, fatigue and fever. HENT: Negative for congestion and sore throat. Eyes: Negative for visual disturbance.    Respiratory: Negative for cough and shortness of breath. Cardiovascular: Negative for chest pain. Gastrointestinal: Positive for abdominal pain, diarrhea and nausea. Negative for vomiting. Genitourinary: Negative for dysuria and hematuria. Musculoskeletal: Negative for back pain and neck pain. Skin: Negative for pallor and rash. Neurological: Positive for headaches. Negative for light-headedness. All other systems reviewed and are negative. Except as noted above the remainder of the review of systems was reviewed and negative.        PAST MEDICAL HISTORY     Past Medical History:   Diagnosis Date    Gestational diabetes 2018    Sjogren's syndrome [facility])          SURGICAL HISTORY       Past Surgical History:   Procedure Laterality Date    WISDOM TOOTH EXTRACTION  2008         CURRENT MEDICATIONS       Discharge Medication List as of 9/8/2020 10:37 AM      CONTINUE these medications which have NOT CHANGED    Details   pilocarpine (SALAGEN) 5 MG tablet Take 1 tablet by mouth 3 times daily, Disp-90 tablet, R-2Normal             ALLERGIES     Amoxicillin and Pcn [penicillins]    FAMILY HISTORY       Family History   Problem Relation Age of Onset    Diabetes Mother     Hypertension Father     No Known Problems Sister     No Known Problems Brother     Arthritis Maternal Grandmother         osteoarthritis     Heart Disease Maternal Grandfather         arrhythimia     Hypertension Maternal Grandfather     Hypertension Paternal Grandmother     Arthritis Paternal Grandmother         \"autoimmune\"     Heart Attack Paternal Grandfather         76s    No Known Problems Brother           SOCIAL HISTORY       Social History     Socioeconomic History    Marital status:      Spouse name: None    Number of children: None    Years of education: None    Highest education level: None   Occupational History    None   Social Needs    Financial resource strain: None    Food insecurity     Worry: None     Inability: None    Transportation needs     Medical: None     Non-medical: None   Tobacco Use    Smoking status: Never Smoker    Smokeless tobacco: Never Used   Substance and Sexual Activity    Alcohol use: Never     Frequency: Never    Drug use: Never    Sexual activity: Yes     Partners: Male     Comment:    Lifestyle    Physical activity     Days per week: None     Minutes per session: None    Stress: None   Relationships    Social connections     Talks on phone: None     Gets together: None     Attends [employer] service: None     Active member of club or organization: None     Attends meetings of clubs or organizations: None     Relationship status: None    Intimate partner violence     Fear of current or ex partner: None     Emotionally abused: None     Physically abused: None     Forced sexual activity: None   Other Topics Concern    None   Social History Narrative    None       SCREENINGS               PHYSICAL EXAM    (up to 7 for level 4, 8 or more for level 5)     ED Triage Vitals   BP Temp Temp src Pulse Resp SpO2 Height Weight   -- -- -- -- -- -- -- --       Physical Exam  Vitals signs and nursing note reviewed. Constitutional:       General: She is not in acute distress. Appearance: Normal appearance. HENT:      Head: Normocephalic and atraumatic. Nose: Nose normal. No congestion. Mouth/Throat:      Mouth: Mucous membranes are moist.   Eyes:      Conjunctiva/sclera: Conjunctivae normal.   Neck:      Musculoskeletal: Normal range of motion and neck supple. Cardiovascular:      Rate and Rhythm: Regular rhythm. Tachycardia present. Pulses: Normal pulses. Heart sounds: Normal heart sounds. Pulmonary:      Effort: Pulmonary effort is normal. No respiratory distress. Breath sounds: Normal breath sounds. Abdominal:      General: There is no distension. Palpations: Abdomen is soft. Tenderness:  There is abdominal tenderness in the right lower quadrant, suprapubic area and left lower quadrant. Positive signs include Rovsing's sign and McBurney's sign. Musculoskeletal: Normal range of motion. General: No swelling or deformity. Skin:     General: Skin is warm and dry. Neurological:      General: No focal deficit present. Mental Status: She is alert and oriented to person, place, and time. DIAGNOSTIC RESULTS     EKG: All EKG's are interpreted by the Emergency Department Physician who either signs or Co-signs this chart in the absence of a cardiologist.        RADIOLOGY:     Interpretation per the Radiologist below, if available at the time of this note:    CT ABDOMEN PELVIS W IV CONTRAST Additional Contrast? None   Final Result   1. Extensive wall thickening with adjacent inflammation involving the cecum   as well as the terminal ileum and ascending colon consistent with ileocolitis   etiology of which may be infectious or inflammatory nature, possibly Crohn's.   2. Left ovarian cystic lesion as measured above with peripheral enhancement   and collapsed appearing borders. Moderate amount of free fluid in the left   adnexal region cul-de-sac density of which is not consistent with simple   fluid. Findings suggest partially ruptured corpus luteum or hemorrhagic   cyst.  Consider further assessment with emergent pelvic ultrasound as   clinically warranted, otherwise no routine follow-up imaging recommended per   guidelines below. RECOMMENDATIONS:   Small benign appearing ovarian cyst. No follow-up imaging is recommended. Reference: J Am Eric Radiol 2013;10:[id number]         XR CHEST PORTABLE   Final Result   No acute process.                LABS:  Labs Reviewed   CBC WITH AUTO DIFFERENTIAL - Abnormal; Notable for the following components:       Result Value    RBC 3.87 (*)     Hemoglobin 11.7 (*)     Hematocrit 34.0 (*)     Platelets 553 (*)     Lymphocytes Absolute 0.3 (*)     All other components within normal limits    Narrative: Performed at:  [address],  [address] [place] [address], [address]   Phone [phone]   COMPREHENSIVE METABOLIC PANEL - Abnormal; Notable for the following components:    Sodium 134 (*)     Glucose 132 (*)     BUN 4 (*)     All other components within normal limits    Narrative:     Performed at:  [address],  [address] [place] [address], [address]   Phone [phone]   URINE RT REFLEX TO CULTURE - Abnormal; Notable for the following components:    Ketones, Urine 40 (*)     Protein, UA TRACE (*)     All other components within normal limits    Narrative:     Performed at:  [address],  [address] [place] [address], [address]   Phone [phone]   LIPASE - Abnormal; Notable for the following components:    Lipase 12.0 (*)     All other components within normal limits    Narrative:     Performed at:  [address],  [address] [place] [address], [address]   Phone [phone]   LACTIC ACID, PLASMA    Narrative:     Performed at:  [address],  [address] [place] [address], [address]   Phone [phone]   HCG, SERUM, QUALITATIVE    Narrative:     Performed at:  [address],  [address] [place] [address], [address]   Phone [phone]   MICROSCOPIC URINALYSIS    Narrative:     Performed at:  [address],  [address] [place] [address], [address]   Phone [phone]       All other labs were within normal range or not returned as of this dictation.     EMERGENCY DEPARTMENT COURSE and DIFFERENTIAL DIAGNOSIS/MDM:   Vitals:    Vitals:    09/08/20 6925 09/08/20 0904 09/08/20 0937 09/08/20 1057   BP: 112/72  112/78 99/67   Pulse: 114  110 112   Resp: 19  18 18   Temp:  99.3 °F (37.4 °C)     TempSrc:  Oral     SpO2: 98%  100% 99%   Weight:       Height:           Patient evaluated and previous record reviewed. Patient presents with fever, headache, and diarrhea. Vital signs notable for tachycardia, febrile, normotensive. Physical exam as documented above notable for right lower quadrant abdominal pain lab work obtained and notable for white blood cell count within normal limits, lactic within normal limits, electrolytes within normal limits, UA without evidence of UTI. Chest x-ray without acute cardiopulmonary abnormality. CT scan concerning for ileocolitis. Patient initially given IV fluids and Tylenol for fever. On reevaluation fever has decreased to 99.3 °F but patient still reporting headache. Patient given a migraine cocktail with Toradol, Benadryl, Reglan, and magnesium. Patient also given additional IV fluids. On reevaluation patient feels significantly improved. She is still mildly tachycardic but is comfortable with continuing to rehydrate with p.o. fluids at home. Advised her of the findings on her CT scan and recommended she follow-up with GI due to her history of Sjogren's syndrome. Will start her on Cipro to cover for infectious causes of ileocolitis as she is febrile. Informed patient of the 2.3 cm ovarian cyst on her CT scan that seems most consistent with a corpus luteum cyst and is patient is not having intractable abdominal pain do not feel as though this warrants further work-up at this time. Did advise patient of return precautions and to follow-up with her gynecologist if needed as an outpatient. Patient was amenable with this plan of care. Patient discharged home with strict return precautions. CONSULTS:  None    PROCEDURES:  Unless otherwise noted below, none     Procedures      FINAL IMPRESSION      1. Ileocolitis    2. Fever, unspecified fever cause    3.  Acute nonintractable headache, unspecified headache type          DISPOSITION/PLAN   DISPOSITION        PATIENT REFERRED TO:  [phone]

## 2020-09-09 ENCOUNTER — CARE COORDINATION (OUTPATIENT)
Dept: CARE COORDINATION | Age: 30
End: 2020-09-09

## 2020-09-30 ENCOUNTER — OFFICE VISIT (OUTPATIENT)
Dept: FAMILY MEDICINE CLINIC | Age: 30
End: 2020-09-30
Payer: OTHER GOVERNMENT

## 2020-09-30 VITALS
WEIGHT: 134 LBS | DIASTOLIC BLOOD PRESSURE: 70 MMHG | TEMPERATURE: 97.9 F | BODY MASS INDEX: 22.88 KG/M2 | HEART RATE: 67 BPM | RESPIRATION RATE: 16 BRPM | SYSTOLIC BLOOD PRESSURE: 112 MMHG | OXYGEN SATURATION: 98 % | HEIGHT: 64 IN

## 2020-09-30 PROCEDURE — 99214 OFFICE O/P EST MOD 30 MIN: CPT | Performed by: FAMILY MEDICINE

## 2020-09-30 PROCEDURE — 86580 TB INTRADERMAL TEST: CPT | Performed by: FAMILY MEDICINE

## 2020-09-30 RX ORDER — MESALAMINE 0.38 G/1
CAPSULE, EXTENDED RELEASE ORAL
COMMUNITY
Start: 2020-09-23 | End: 2021-09-13

## 2020-09-30 RX ORDER — BUDESONIDE 9 MG/1
TABLET, FILM COATED, EXTENDED RELEASE ORAL
COMMUNITY
Start: 2020-09-24 | End: 2021-01-13

## 2020-09-30 ASSESSMENT — ENCOUNTER SYMPTOMS
SHORTNESS OF BREATH: 0
BACK PAIN: 0
VOMITING: 0
DIARRHEA: 0
BLOOD IN STOOL: 0
CONSTIPATION: 0
NAUSEA: 0
ABDOMINAL PAIN: 0
COLOR CHANGE: 0
ABDOMINAL DISTENTION: 0

## 2020-09-30 NOTE — PROGRESS NOTES
9/30/2020    This is a 35 y.o. female who presents for  Chief Complaint   Patient presents with    Follow-Up from Hospital     Lower GI infection    Vaginitis     From ATB       HPI:     Seen in the ER for ileocolitis on 9/8  Still has dec appetite, but denies n/v/d/c/abd pain/fever    Had a colonoscopy on Monday  \"Chronic Colitis,\" not Crohn's   Has f/u appmnt next week  Put her on Budesonide and Mesalamine  Went to a dietician, got a food tolerance testing (95)  Dietician is requesting other lab work    Ovarian cyst: aware.  No new abd pain     Vaginal Sxs: has had recurrent BV, worried more about this  + itching, discharge (white), pain      Past Medical History:   Diagnosis Date    Gestational diabetes 2018    Sjogren's syndrome ([facility] [address]        Past Surgical History:   Procedure Laterality Date    WISDOM TOOTH EXTRACTION  2008       Social History     Socioeconomic History    Marital status:      Spouse name: Not on file    Number of children: Not on file    Years of education: Not on file    Highest education level: Not on file   Occupational History    Not on file   Social Needs    Financial resource strain: Not on file    Food insecurity     Worry: Not on file     Inability: Not on file   [employer] needs     Medical: Not on file     Non-medical: Not on file   Tobacco Use    Smoking status: Never Smoker    Smokeless tobacco: Never Used   Substance and Sexual Activity    Alcohol use: Never     Frequency: Never    Drug use: Never    Sexual activity: Yes     Partners: Male     Comment:    Lifestyle    Physical activity     Days per week: Not on file     Minutes per session: Not on file    Stress: Not on file   Relationships    Social connections     Talks on phone: Not on file     Gets together: Not on file     Attends Spiritism service: Not on file     Active member of club or organization: Not on file     Attends meetings of clubs or organizations: Not on file Relationship status: Not on file    Intimate partner violence     Fear of current or ex partner: Not on file     Emotionally abused: Not on file     Physically abused: Not on file     Forced sexual activity: Not on file   Other Topics Concern    Not on file   Social History Narrative    Not on file       Family History   Problem Relation Age of Onset    Diabetes Mother     Hypertension Father     No Known Problems Sister     No Known Problems Brother     Arthritis Maternal Grandmother         osteoarthritis     Heart Disease Maternal Grandfather         arrhythimia     Hypertension Maternal Grandfather     Hypertension Paternal Grandmother     Arthritis Paternal Grandmother         \"autoimmune\"     Heart Attack Paternal Grandfather         76s    No Known Problems Brother        Current Outpatient Medications   Medication Sig Dispense Refill    Budesonide ER 9 MG TB24 TK 1 T PO D      mesalamine (APRISO) 0.375 g extended release capsule TK 2 CS PO BID      ondansetron (ZOFRAN-ODT) 4 MG disintegrating tablet Take 1 tablet by mouth 3 times daily as needed for Nausea or Vomiting (Patient not taking: Reported on 9/30/2020) 21 tablet 0    pilocarpine (SALAGEN) 5 MG tablet Take 1 tablet by mouth 3 times daily (Patient not taking: Reported on 9/30/2020) 90 tablet 2     No current facility-administered medications for this visit. Immunization History   Administered Date(s) Administered    PPD Test 09/30/2020       Allergies   Allergen Reactions    Amoxicillin     Pcn [Penicillins] Anaphylaxis, Itching and Swelling     Throat starts to itch and swell       Review of Systems   Constitutional: Negative for activity change, appetite change, chills, diaphoresis, fatigue, fever and unexpected weight change. Respiratory: Negative for shortness of breath. Cardiovascular: Negative for chest pain.    Gastrointestinal: Negative for abdominal distention, abdominal pain, blood in stool, constipation, diarrhea, nausea and vomiting. Genitourinary: Negative for decreased urine volume, difficulty urinating, dyspareunia, dysuria, flank pain, frequency, genital sores, hematuria, menstrual problem, pelvic pain, urgency, vaginal bleeding, vaginal discharge and vaginal pain. Musculoskeletal: Negative for arthralgias and back pain. Skin: Negative for color change and rash. Allergic/Immunologic: Negative for immunocompromised state. Neurological: Negative for dizziness, light-headedness and headaches. Hematological: Negative for adenopathy. /70 (Site: Left Upper Arm, Position: Sitting, Cuff Size: Medium Adult)   Pulse 67   Temp 97.9 °F (36.6 °C) (Temporal)   Resp 16   Ht 5' 4\" (1.626 m)   Wt 134 lb (60.8 kg)   SpO2 98%   BMI 23.00 kg/m²     Physical Exam  Vitals signs and nursing note reviewed. Constitutional:       General: She is not in acute distress. Appearance: She is well-developed. She is not diaphoretic. HENT:      Head: Normocephalic and atraumatic. Eyes:      Conjunctiva/sclera: Conjunctivae normal.      Pupils: Pupils are equal, round, and reactive to light. Neck:      Musculoskeletal: Normal range of motion and neck supple. Vascular: No JVD. Cardiovascular:      Rate and Rhythm: Normal rate and regular rhythm. Heart sounds: Normal heart sounds. No murmur. No friction rub. No gallop. Pulmonary:      Effort: Pulmonary effort is normal. No respiratory distress. Breath sounds: Normal breath sounds. No wheezing or rales. Chest:      Chest wall: No tenderness. Abdominal:      Palpations: Abdomen is soft. Tenderness: There is no abdominal tenderness. Musculoskeletal: Normal range of motion. Skin:     General: Skin is warm and dry. Capillary Refill: Capillary refill takes 2 to 3 seconds. Findings: No erythema. Neurological:      Mental Status: She is alert and oriented to person, place, and time.    Psychiatric:         Behavior:

## 2020-10-01 LAB
CANDIDA SPECIES, DNA PROBE: ABNORMAL
GARDNERELLA VAGINALIS, DNA PROBE: ABNORMAL
TRICHOMONAS VAGINALIS DNA: ABNORMAL

## 2020-10-01 RX ORDER — METRONIDAZOLE 500 MG/1
500 TABLET ORAL 2 TIMES DAILY
Qty: 14 TABLET | Refills: 0 | Status: SHIPPED | OUTPATIENT
Start: 2020-10-01 | End: 2020-10-08

## 2020-10-01 RX ORDER — FLUCONAZOLE 150 MG/1
TABLET ORAL
Qty: 3 TABLET | Refills: 0 | Status: SHIPPED | OUTPATIENT
Start: 2020-10-01 | End: 2020-11-11 | Stop reason: ALTCHOICE

## 2020-10-02 ENCOUNTER — NURSE ONLY (OUTPATIENT)
Dept: FAMILY MEDICINE CLINIC | Age: 30
End: 2020-10-02

## 2020-10-02 DIAGNOSIS — K63.3 COLONIC ULCER: ICD-10-CM

## 2020-10-02 DIAGNOSIS — K52.9 COLITIS: ICD-10-CM

## 2020-10-02 DIAGNOSIS — R63.0 DECREASED APPETITE: ICD-10-CM

## 2020-10-02 LAB
ANTI-THYROGLOB ABS: 35 IU/ML
C-REACTIVE PROTEIN: 0.8 MG/L (ref 0–5.1)
CHOLESTEROL, TOTAL: 192 MG/DL (ref 0–199)
FERRITIN: 24.6 NG/ML (ref 15–150)
FOLATE: 9.97 NG/ML (ref 4.78–24.2)
GAMMA GLUTAMYL TRANSFERASE: 13 U/L (ref 5–36)
HDLC SERPL-MCNC: 58 MG/DL (ref 40–60)
INDURATION: NORMAL
IRON SATURATION: 21 % (ref 15–50)
IRON: 62 UG/DL (ref 37–145)
LDL CHOLESTEROL CALCULATED: 124 MG/DL
MAGNESIUM: 2 MG/DL (ref 1.8–2.4)
SEDIMENTATION RATE, ERYTHROCYTE: 49 MM/HR (ref 0–20)
T3 TOTAL: 1.18 NG/ML (ref 0.8–2)
T4 FREE: 1.4 NG/DL (ref 0.9–1.8)
TB SKIN TEST: NORMAL
THYROID PEROXIDASE (TPO) ABS: 9 IU/ML
TOTAL IRON BINDING CAPACITY: 295 UG/DL (ref 260–445)
TRIGL SERPL-MCNC: 48 MG/DL (ref 0–150)
TSH SERPL DL<=0.05 MIU/L-ACNC: 1.32 UIU/ML (ref 0.27–4.2)
VITAMIN B-12: 630 PG/ML (ref 211–911)
VITAMIN D 25-HYDROXY: 37.2 NG/ML
VLDLC SERPL CALC-MCNC: 10 MG/DL

## 2020-10-06 LAB — COPPER: 102.3 UG/DL (ref 80–155)

## 2020-10-07 LAB
T3 REVERSE: 21 NG/DL (ref 9–27)
ZINC: 78.8 UG/DL (ref 60–120)

## 2020-10-08 LAB — METHYLMALONIC ACID: 0.11 UMOL/L (ref 0–0.4)

## 2020-11-02 ENCOUNTER — HOSPITAL ENCOUNTER (OUTPATIENT)
Dept: ULTRASOUND IMAGING | Age: 30
Discharge: HOME | End: 2020-11-02
Payer: OTHER GOVERNMENT

## 2020-11-02 PROCEDURE — 76856 US EXAM PELVIC COMPLETE: CPT

## 2020-11-02 PROCEDURE — 76830 TRANSVAGINAL US NON-OB: CPT

## 2020-11-05 ENCOUNTER — TELEPHONE (OUTPATIENT)
Dept: FAMILY MEDICINE CLINIC | Age: 30
End: 2020-11-05

## 2020-11-05 NOTE — TELEPHONE ENCOUNTER
----- Message from [address] sent at 11/4/2020  4:04 PM EST -----  Subject: Referral Request    QUESTIONS   Reason for referral request? Pt is requesting a referral for   Rheumatologist and it can be faxed to [phone] Dr. Sarah Dunham   Has the physician seen you for this condition before? No   Preferred Specialist (if applicable)? Do you already have an appointment scheduled? Additional Information for Provider?   ---------------------------------------------------------------------------  --------------  CALL BACK INFO  What is the best way for the office to contact you? OK to leave message on   voicemail  Preferred Call Back Phone Number?  [phone]

## 2020-11-11 ENCOUNTER — PROCEDURE VISIT (OUTPATIENT)
Dept: FAMILY MEDICINE CLINIC | Age: 30
End: 2020-11-11
Payer: OTHER GOVERNMENT

## 2020-11-11 VITALS
SYSTOLIC BLOOD PRESSURE: 110 MMHG | WEIGHT: 133 LBS | BODY MASS INDEX: 22.83 KG/M2 | TEMPERATURE: 98.2 F | OXYGEN SATURATION: 98 % | DIASTOLIC BLOOD PRESSURE: 60 MMHG | HEART RATE: 91 BPM

## 2020-11-11 PROBLEM — H43.823 VITREOMACULAR ADHESION OF BOTH EYES: Status: RESOLVED | Noted: 2020-01-20 | Resolved: 2020-11-11

## 2020-11-11 PROBLEM — L60.0 IGTN (INGROWING TOE NAIL): Status: RESOLVED | Noted: 2020-01-20 | Resolved: 2020-11-11

## 2020-11-11 PROBLEM — H35.373 EPIRETINAL MEMBRANE (ERM) OF BOTH EYES: Status: RESOLVED | Noted: 2020-01-20 | Resolved: 2020-11-11

## 2020-11-11 PROBLEM — R10.817 GENERALIZED ABDOMINAL TENDERNESS: Status: ACTIVE | Noted: 2020-11-11

## 2020-11-11 PROBLEM — K50.90 CROHN'S DISEASE (HCC): Status: ACTIVE | Noted: 2020-09-08

## 2020-11-11 PROBLEM — R35.0 URINARY FREQUENCY: Status: ACTIVE | Noted: 2020-11-11

## 2020-11-11 PROBLEM — J30.9 ALLERGIC RHINITIS: Status: ACTIVE | Noted: 2020-11-11

## 2020-11-11 PROBLEM — Z79.899 LONG-TERM USE OF PLAQUENIL: Status: RESOLVED | Noted: 2020-01-20 | Resolved: 2020-11-11

## 2020-11-11 PROBLEM — R53.81 MALAISE: Status: ACTIVE | Noted: 2020-11-11

## 2020-11-11 ASSESSMENT — ENCOUNTER SYMPTOMS
NAUSEA: 0
BACK PAIN: 0
CONSTIPATION: 0
DIARRHEA: 0
ABDOMINAL PAIN: 0
COLOR CHANGE: 0
VOMITING: 0
ABDOMINAL DISTENTION: 0
SHORTNESS OF BREATH: 0

## 2020-11-11 NOTE — PATIENT INSTRUCTIONS
change. If you have a high-risk type of human papillomavirus (HPV) or cell changes that could turn into cancer, you may need more tests. The next step may be a colposcopy or treatment to remove or destroy the abnormal cells. If you have a Pap test, an HPV test, or both, your doctor will recommend a follow-up plan based on your results and your age. Follow-up care is a key part of your treatment and safety. Be sure to make and go to all appointments, and call your doctor if you are having problems. It's also a good idea to know your test results and keep a list of the medicines you take. Where can you learn more? Go to https://[email or web address].[email or web address] org and sign in to your [employer] account. Enter P919 in the [employer] box to learn more about \"Learning About Cervical Cancer Screening. \"     If you do not have an account, please click on the \"Sign Up Now\" link. Current as of: April 29, 2020               Content Version: 12.6  © 3247-3089 [employer]. Care instructions adapted under license by [facility] ([facility]). If you have questions about a medical condition or this instruction, always ask your healthcare professional. [place] [address] any warranty or liability for your use of this information.

## 2020-11-11 NOTE — PROGRESS NOTES
Onset    Diabetes Mother     Hypertension Father     No Known Problems Sister     No Known Problems Brother     Arthritis Maternal Grandmother         osteoarthritis     Heart Disease Maternal Grandfather         arrhythimia     Hypertension Maternal Grandfather     Hypertension Paternal Grandmother     Arthritis Paternal Grandmother         \"autoimmune\"     Heart Attack Paternal Grandfather         76s    No Known Problems Brother        Current Outpatient Medications   Medication Sig Dispense Refill    Budesonide ER 9 MG TB24 TK 1 T PO D      mesalamine (APRISO) 0.375 g extended release capsule TK 2 CS PO BID       No current facility-administered medications for this visit. Immunization History   Administered Date(s) Administered    PPD Test 09/30/2020       Allergies   Allergen Reactions    Amoxicillin     Pcn [Penicillins] Anaphylaxis, Itching and Swelling     Throat starts to itch and swell       Review of Systems   Constitutional: Negative for activity change, chills, fever and unexpected weight change. Respiratory: Negative for shortness of breath. Cardiovascular: Negative for chest pain. Gastrointestinal: Negative for abdominal distention, abdominal pain, constipation, diarrhea, nausea and vomiting. Genitourinary: Negative for decreased urine volume, difficulty urinating, dyspareunia, dysuria, flank pain, frequency, genital sores, hematuria, menstrual problem, pelvic pain, urgency, vaginal bleeding, vaginal discharge and vaginal pain. Musculoskeletal: Negative for back pain. Skin: Negative for color change and rash. Allergic/Immunologic: Negative for immunocompromised state. Hematological: Negative for adenopathy.        /60 (Site: Left Upper Arm, Position: Sitting, Cuff Size: Medium Adult)   Pulse 91   Temp 98.2 °F (36.8 °C) (Temporal)   Wt 133 lb (60.3 kg)   LMP 10/22/2020 (Exact Date)   SpO2 98%   BMI 22.83 kg/m²     Physical Exam  Vitals signs and nursing note reviewed. Constitutional:       General: She is not in acute distress. Appearance: She is well-developed. She is not diaphoretic. HENT:      Head: Normocephalic and atraumatic. Eyes:      Pupils: Pupils are equal, round, and reactive to light. Neck:      Musculoskeletal: Normal range of motion and neck supple. Cardiovascular:      Rate and Rhythm: Normal rate and regular rhythm. Pulmonary:      Effort: Pulmonary effort is normal. No respiratory distress. Breath sounds: Normal breath sounds. Abdominal:      General: There is no distension. Palpations: Abdomen is soft. There is no mass. Tenderness: There is no abdominal tenderness. There is no guarding or rebound. Genitourinary:     Labia:         Right: No rash, tenderness, lesion or injury. Left: No rash, tenderness, lesion or injury. Vagina: Normal. No signs of injury and foreign body. No vaginal discharge, erythema, tenderness or bleeding. Cervix: No cervical motion tenderness, discharge or friability. Uterus: Not deviated, not enlarged, not fixed and not tender. Adnexa:         Right: No mass, tenderness or fullness. Left: No mass, tenderness or fullness. Rectum: No external hemorrhoid. Musculoskeletal: Normal range of motion. Skin:     General: Skin is warm and dry. Coloration: Skin is not pale. Findings: No erythema or rash. Neurological:      Mental Status: She is alert. Psychiatric:         Behavior: Behavior normal.         Thought Content: Thought content normal.         Judgment: Judgment normal.         Plan  1. Cervical cancer screening  - PAP SMEAR        While assessing care for this patient, I have reviewed all pertinent lab work/imaging/ specialist notes and care in reference to those problems addressed above in detail. Appropriate medical decision making was based on this.      Please note that portions of this note may have been completed with a voice recognition program. Efforts were made to edit the dictations but occasionally words are mis-transcribed. Return if symptoms worsen or fail to improve.

## 2020-11-16 ENCOUNTER — TELEPHONE (OUTPATIENT)
Dept: FAMILY MEDICINE CLINIC | Age: 30
End: 2020-11-16

## 2020-11-16 RX ORDER — FLUCONAZOLE 150 MG/1
150 TABLET ORAL
Qty: 2 TABLET | Refills: 0 | Status: SHIPPED | OUTPATIENT
Start: 2020-11-16 | End: 2020-11-20

## 2020-11-16 RX ORDER — METRONIDAZOLE 500 MG/1
500 TABLET ORAL 2 TIMES DAILY
Qty: 14 TABLET | Refills: 0 | Status: SHIPPED | OUTPATIENT
Start: 2020-11-16 | End: 2020-11-23

## 2020-11-16 NOTE — TELEPHONE ENCOUNTER
She was + last episode for a yeast infection. This episode she is + for Bacterial vaginosis. I would recommend oral treatment with a 7 day course of antibiotics for that, PLUS 2 doses of diflucan for any potential yeast infection that may occur as a results of treatment (one dose at the end of her antibiotic course and another dose 72 hours later). Please let her know. Both scripts sent in.  Thanks

## 2020-11-16 NOTE — TELEPHONE ENCOUNTER
Pt. Is responding to your my chart message regarding the results of there Vaginal Swab. She states she will do whatever you thinks is best. Willing to do oral or supp.

## 2020-11-20 LAB
HPV COMMENT: ABNORMAL
HPV TYPE 16: NOT DETECTED
HPV TYPE 18: NOT DETECTED
HPVOH (OTHER TYPES): DETECTED

## 2020-11-23 ENCOUNTER — TELEPHONE (OUTPATIENT)
Dept: FAMILY MEDICINE CLINIC | Age: 30
End: 2020-11-23

## 2020-11-23 NOTE — TELEPHONE ENCOUNTER
Pt called because she got her pap results in My Chart and they say abnormal.  She would like to receive a call about this.   [phone]

## 2020-11-25 NOTE — TELEPHONE ENCOUNTER
[phone] (home)   Pt called back she was returning Dr. Gomez Less message about results.  Stated she is available until 1:15pm

## 2020-12-07 ENCOUNTER — OFFICE VISIT (OUTPATIENT)
Dept: OBGYN CLINIC | Age: 30
End: 2020-12-07
Payer: OTHER GOVERNMENT

## 2020-12-07 VITALS
DIASTOLIC BLOOD PRESSURE: 70 MMHG | HEART RATE: 82 BPM | BODY MASS INDEX: 21.29 KG/M2 | HEIGHT: 66 IN | SYSTOLIC BLOOD PRESSURE: 116 MMHG | WEIGHT: 132.5 LBS | TEMPERATURE: 97.9 F

## 2020-12-07 PROCEDURE — 57454 BX/CURETT OF CERVIX W/SCOPE: CPT | Performed by: OBSTETRICS & GYNECOLOGY

## 2020-12-07 RX ORDER — LANOLIN ALCOHOL/MO/W.PET/CERES
400 CREAM (GRAM) TOPICAL DAILY
Qty: 90 TABLET | Refills: 3 | Status: SHIPPED | OUTPATIENT
Start: 2020-12-07 | End: 2021-09-13

## 2020-12-07 NOTE — PROGRESS NOTES
Last PAP was abnormal;  November/2020 - ASC-US; Atypical Squamous Cells of Undetermined Significance. Abnormal pap history?  yes  Last HPV screen: 2020 positive  Patient has never had a mammogram.  Last Dexa Scan:  no  Contraceptive method: None  Last colonoscopy was 2020

## 2020-12-27 ASSESSMENT — ENCOUNTER SYMPTOMS
CONSTIPATION: 0
ABDOMINAL DISTENTION: 0
NAUSEA: 0
VOMITING: 0
SHORTNESS OF BREATH: 0
DIARRHEA: 0
ABDOMINAL PAIN: 0

## 2020-12-28 NOTE — PROGRESS NOTES
Subjective:      Patient ID: Becky Arevalo is a 35 y.o. female. HPI  36 y/o  female presents for colposcopy secondary to abnormal pap smear. Pap smear on 20 indicated ASCUS findings with positive HPV. 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 Menarche occurred age 16. Menses occur every month x 5-7 days, heavy 1st couple days (tampon and pad). FDLMP:  2020. Declines contraception. History is positive for left ovarian cyst.  Denies known history of pelvic infection, uterine fibroid and endometriosis. Medical history is significant for gestational diabetes in 2nd pregnancy and new diagnosis of Crohns and Sjogrens in the last 2 months. Review of Systems   Constitutional: Negative. Negative for activity change, appetite change, chills, fatigue, fever and unexpected weight change. Respiratory: Negative for shortness of breath. Cardiovascular: Negative for chest pain. Gastrointestinal: Negative for abdominal distention, abdominal pain, constipation, diarrhea, nausea and vomiting. Genitourinary: Negative for difficulty urinating, dysuria, genital sores, hematuria, menstrual problem, pelvic pain, vaginal bleeding, vaginal discharge and vaginal pain. Psychiatric/Behavioral: Negative. Objective:   Physical Exam  Vitals signs and nursing note reviewed. Exam conducted with a chaperone present. Constitutional:       General: She is not in acute distress. Appearance: Normal appearance. She is well-developed. She is not toxic-appearing or diaphoretic. Pulmonary:      Effort: Pulmonary effort is normal.   Genitourinary:     General: Normal vulva. Exam position: Lithotomy position. Labia:         Right: No rash, tenderness, lesion or injury. Left: No rash, tenderness or lesion. Vagina: No signs of injury and foreign body.  No vaginal discharge, erythema,

## 2020-12-30 PROBLEM — N92.0 MENORRHAGIA WITH REGULAR CYCLE: Status: ACTIVE | Noted: 2020-12-30

## 2021-01-05 ENCOUNTER — OFFICE VISIT (OUTPATIENT)
Dept: FAMILY MEDICINE CLINIC | Age: 31
End: 2021-01-05
Payer: OTHER GOVERNMENT

## 2021-01-05 VITALS
WEIGHT: 132 LBS | BODY MASS INDEX: 21.21 KG/M2 | SYSTOLIC BLOOD PRESSURE: 100 MMHG | OXYGEN SATURATION: 99 % | DIASTOLIC BLOOD PRESSURE: 68 MMHG | RESPIRATION RATE: 14 BRPM | HEIGHT: 66 IN | TEMPERATURE: 97.5 F | HEART RATE: 68 BPM

## 2021-01-05 DIAGNOSIS — R87.613 HIGH GRADE SQUAMOUS INTRAEPITHELIAL CERVICAL DYSPLASIA: Primary | ICD-10-CM

## 2021-01-05 DIAGNOSIS — Z01.818 PRE-OP EXAMINATION: ICD-10-CM

## 2021-01-05 PROCEDURE — 99213 OFFICE O/P EST LOW 20 MIN: CPT | Performed by: NURSE PRACTITIONER

## 2021-01-05 SDOH — ECONOMIC STABILITY: FOOD INSECURITY: WITHIN THE PAST 12 MONTHS, THE FOOD YOU BOUGHT JUST DIDN'T LAST AND YOU DIDN'T HAVE MONEY TO GET MORE.: NEVER TRUE

## 2021-01-05 SDOH — ECONOMIC STABILITY: TRANSPORTATION INSECURITY
IN THE PAST 12 MONTHS, HAS THE LACK OF TRANSPORTATION KEPT YOU FROM MEDICAL APPOINTMENTS OR FROM GETTING MEDICATIONS?: NO

## 2021-01-05 SDOH — HEALTH STABILITY: PHYSICAL HEALTH: ON AVERAGE, HOW MANY DAYS PER WEEK DO YOU ENGAGE IN MODERATE TO STRENUOUS EXERCISE (LIKE A BRISK WALK)?: 3 DAYS

## 2021-01-05 ASSESSMENT — PATIENT HEALTH QUESTIONNAIRE - PHQ9
2. FEELING DOWN, DEPRESSED OR HOPELESS: 0
SUM OF ALL RESPONSES TO PHQ9 QUESTIONS 1 & 2: 0

## 2021-01-05 NOTE — PROGRESS NOTES
Preoperative Evaluation    Subjective:   Bhargav Muñiz is a 35 y.o. y.o.  female who presents to the office today for a preoperative consultation. Surgeon: Dr. Ashton File    Location: [place] [facility]  Performing:  Cervical Conization/LEEP procedure  Date: January 15. Planned anesthesia is General.   The patient has the following known anesthesia issues: none   Patient has a bleeding risk of : no recent abnormal bleeding   Patient does not have objection to receiving blood products if needed. Recent steroid use (end of November)    Review of Systems   Pertinent items are noted in HPI.      Denies fevers, chills, diaphoresis, CP, SOB, dysphagia, abd pain, urinary complaints, new edema, rashes, cyanosis    Past Medical History:   Diagnosis Date    Abnormal Pap smear of cervix     [facility] disorder [facility] [address]     Gestational diabetes 2018    Sjogren's syndrome ([facility] [address]        Past Surgical History:   Procedure Laterality Date    WISDOM TOOTH EXTRACTION  2008       Social History     Tobacco History     Smoking Status  Never Smoker    Smokeless Tobacco Use  Never Used          Alcohol History     Alcohol Use Status  Never          Drug Use     Drug Use Status  Never          Sexual Activity     Sexually Active  Yes Partners  Male Birth Control/Protection  Condom Comment                  Family History   Problem Relation Age of Onset    Diabetes Mother     Hypertension Father     No Known Problems Sister     No Known Problems Brother     Arthritis Maternal Grandmother         osteoarthritis     Heart Disease Maternal Grandfather         arrhythimia     Hypertension Maternal Grandfather     Hypertension Paternal Grandmother     Arthritis Paternal Grandmother         \"autoimmune\"     Heart Attack Paternal Grandfather         76s    No Known Problems Brother        Current Outpatient Medications   Medication Sig Dispense Refill    mesalamine (APRISO) 0.375 g extended release capsule TK 2 CS PO BID      folic acid (V-R FOLIC ACID) 585 MCG tablet Take 1 tablet by mouth daily (Patient not taking: Reported on 1/5/2021) 90 tablet 3    Budesonide ER 9 MG TB24 TK 1 T PO D       No current facility-administered medications for this visit. Objective:   Vitals:    01/05/21 1457   BP: 100/68   Pulse: 68   Resp: 14   Temp: 97.5 °F (36.4 °C)   SpO2: 99%       Physical Exam   /68 (Site: Right Upper Arm, Position: Sitting, Cuff Size: Medium Adult)   Pulse 68   Temp 97.5 °F (36.4 °C) (Infrared)   Resp 14   Ht 5' 6\" (1.676 m)   Wt 132 lb (59.9 kg)   LMP 12/26/2020 (Exact Date)   SpO2 99%   Breastfeeding No   BMI 21.31 kg/m²   General appearance: alert, appears stated age, and cooperative  Lungs: clear to auscultation bilaterally  Heart: regular rate and rhythm, S1, S2 normal, no murmur, click, rub or gallop  Abdomen: soft, non-tender; bowel sounds normal; no masses,  no organomegaly  Extremities: extremities normal, atraumatic, no cyanosis or edema  Pulses: 2+ and symmetric  Skin: Skin color, texture, turgor normal. No rashes or lesions  Neurologic: Grossly normal     Predictors of intubation difficulty:   Morbid obesity? no   Anatomically abnormal facies? no   Short, thick neck? no   Neck range of motion: normal   Mallampati score: I (soft palate, uvula, fauces, tonsillar pillars visible)   Dentition: No chipped, loose, or missing teeth. Lab Review   Procedure visit on 11/11/2020   Component Date Value    Trichomonas Vaginalis DNA 11/11/2020                      Value:Negative DNA not detected. Normal range: Negative DNA not detected.  GARDNERELLA VAGINALIS, D* 11/11/2020 *                    Value:POSITIVE DNA Probe detected. Normal range: Negative DNA not detected.  ANNA SPECIES, DNA PRO* 11/11/2020                      Value:Negative DNA not detected. Normal range: Negative DNA not detected.       HPV TYPE 16 11/11/2020 Not Detected     HPV TYPE 18 11/11/2020 Not Detected     HPVOH (OTHER TYPES) 11/11/2020 DETECTED*    HPV Comment 11/11/2020 See below    Orders Only on 10/02/2020   Component Date Value    Anti-Thyroglob Abs 10/02/2020 35     THYROID PEROXIDASE (TPO)* 10/02/2020 9     T3, Reverse 10/02/2020 21.0     Methylmalonic Acid 10/02/2020 0.11     Ferritin 10/02/2020 24.6     Folate 10/02/2020 9.97     Iron 10/02/2020 62     TIBC 10/02/2020 295     Iron Saturation 10/02/2020 21     GGT 10/02/2020 13     Copper 10/02/2020 102.3     Zinc 10/02/2020 78.8     Magnesium 10/02/2020 2.00     T3, Total 10/02/2020 1.18     T4 Free 10/02/2020 1.4     TSH 10/02/2020 1.32     Vitamin B-12 10/02/2020 630     Vit D, 25-Hydroxy 10/02/2020 37.2     Sed Rate 10/02/2020 49*    CRP 10/02/2020 0.8     Cholesterol, Total 10/02/2020 192     Triglycerides 10/02/2020 48     HDL 10/02/2020 58     LDL Calculated 10/02/2020 124*    VLDL Cholesterol Calcula* 10/02/2020 10    Office Visit on 09/30/2020   Component Date Value    TB Skin Test 10/02/2020 NEG     Induration 10/02/2020 0mm     Trichomonas Vaginalis DNA 09/30/2020                      Value:Negative DNA not detected. Normal range: Negative DNA not detected.  GARDNERELLA VAGINALIS, D* 09/30/2020 *                    Value:POSITIVE DNA Probe detected. Normal range: Negative DNA not detected.  ANNA SPECIES, DNA PRO* 09/30/2020 *                    Value:POSITIVE DNA Probe detected. Normal range: Negative DNA not detected.      Admission on 09/08/2020, Discharged on 09/08/2020   Component Date Value    WBC 09/08/2020 5.9     RBC 09/08/2020 3.87*    Hemoglobin 09/08/2020 11.7*    Hematocrit 09/08/2020 34.0*    MCV 09/08/2020 87.9     MCH 09/08/2020 30.1     MCHC 09/08/2020 34.3     RDW 09/08/2020 12.8     Platelets 07/78/6762 123*    MPV 09/08/2020 8.9     Neutrophils % 09/08/2020 88.1     Lymphocytes % 09/08/2020 4.8     Monocytes % 09/08/2020 7.0     Eosinophils % 09/08/2020 0.0     Basophils % 09/08/2020 0.1     Neutrophils Absolute 09/08/2020 5.2     Lymphocytes Absolute 09/08/2020 0.3*    Monocytes Absolute 09/08/2020 0.4     Eosinophils Absolute 09/08/2020 0.0     Basophils Absolute 09/08/2020 0.0     Sodium 09/08/2020 134*    Potassium 09/08/2020 3.7     Chloride 09/08/2020 102     CO2 09/08/2020 21     Anion Gap 09/08/2020 11     Glucose 09/08/2020 132*    BUN 09/08/2020 4*    CREATININE 09/08/2020 0.7     GFR Non- 09/08/2020 >60     GFR  09/08/2020 >60     Calcium 09/08/2020 8.8     Total Protein 09/08/2020 7.9     Alb 09/08/2020 4.1     Albumin/Globulin Ratio 09/08/2020 1.1     Total Bilirubin 09/08/2020 0.4     Alkaline Phosphatase 09/08/2020 54     ALT 09/08/2020 24     AST 09/08/2020 24     Globulin 09/08/2020 3.8     Lactic Acid 09/08/2020 1.1     hCG Qual 09/08/2020 Negative     Color, UA 09/08/2020 Yellow     Clarity, UA 09/08/2020 Clear     Glucose, Ur 09/08/2020 Negative     Bilirubin Urine 09/08/2020 Negative     Ketones, Urine 09/08/2020 40*    Specific Gravity, UA 09/08/2020 1.010     Blood, Urine 09/08/2020 Negative     pH, UA 09/08/2020 6.0     Protein, UA 09/08/2020 TRACE*    Urobilinogen, Urine 09/08/2020 0.2     Nitrite, Urine 09/08/2020 Negative     Leukocyte Esterase, Urine 09/08/2020 Negative     Microscopic Examination 09/08/2020 YES     Urine Type 09/08/2020 NotGiven     Urine Reflex to Culture 09/08/2020 Not Indicated     Lipase 09/08/2020 12.0*    WBC, UA 09/08/2020 0-2     RBC, UA 09/08/2020 0-2     Epithelial Cells, UA 09/08/2020 2-5         Assessment:   35 y.o. female with planned surgery as above. - Known risk factors for perioperative complications: None   - Difficulty with intubation is not anticipated. - Current medications which may produce withdrawal symptoms if withheld perioperatively:      Plan:   1.  Preoperative workup as follows hemoglobin, hematocrit, electrolytes, creatinine, glucose   2. Change in medication regimen before surgery: none, continue med regimen including morning of surgery, w/sip of water   Pt instructed to avoid NSAIDs, ASA, MV, Vitamin E, Fish oil 1 week prior to surgery to decrease bleeding risk. 3. Prophylaxis for cardiac events with perioperative beta-blockers: not indicated   4. Invasive hemodynamic monitoring perioperatively: not indicated   5. Deep vein thrombosis prophylaxis postoperatively:regimen to be chosen by surgical team   6. Other measures: none      1. High grade squamous intraepithelial cervical dysplasia    2. Pre-op examination    - Basic Metabolic Panel  - CBC Auto Differential          While assessing care for this patient, I have reviewed all pertinent lab work/imaging/ specialist notes and care in reference to those problems addressed above in detail. Appropriate medical decision making was based on this.          Pt is at an acceptable risk for planned procedure    Please call with any questions    Hanane Ghosh, AYAKA

## 2021-01-06 ENCOUNTER — OFFICE VISIT (OUTPATIENT)
Dept: OBGYN CLINIC | Age: 31
End: 2021-01-06

## 2021-01-06 VITALS
WEIGHT: 134 LBS | HEART RATE: 73 BPM | DIASTOLIC BLOOD PRESSURE: 72 MMHG | TEMPERATURE: 97.2 F | BODY MASS INDEX: 21.63 KG/M2 | SYSTOLIC BLOOD PRESSURE: 125 MMHG

## 2021-01-06 DIAGNOSIS — M35.00 SJOGREN'S SYNDROME, WITH UNSPECIFIED ORGAN INVOLVEMENT (HCC): ICD-10-CM

## 2021-01-06 DIAGNOSIS — Z01.818 PREOP TESTING: Primary | ICD-10-CM

## 2021-01-06 DIAGNOSIS — N87.1 HIGH GRADE SQUAMOUS INTRAEPITHELIAL LESION (HGSIL), GRADE 2 CIN, ON BIOPSY OF CERVIX: ICD-10-CM

## 2021-01-06 LAB
ANION GAP SERPL CALCULATED.3IONS-SCNC: 8 MMOL/L (ref 3–16)
BASOPHILS ABSOLUTE: 0 K/UL (ref 0–0.2)
BASOPHILS RELATIVE PERCENT: 0.7 %
BUN BLDV-MCNC: 9 MG/DL (ref 7–20)
CALCIUM SERPL-MCNC: 9.6 MG/DL (ref 8.3–10.6)
CHLORIDE BLD-SCNC: 101 MMOL/L (ref 99–110)
CO2: 29 MMOL/L (ref 21–32)
CREAT SERPL-MCNC: 0.6 MG/DL (ref 0.6–1.1)
EOSINOPHILS ABSOLUTE: 0.1 K/UL (ref 0–0.6)
EOSINOPHILS RELATIVE PERCENT: 3.1 %
GFR AFRICAN AMERICAN: >60
GFR NON-AFRICAN AMERICAN: >60
GLUCOSE BLD-MCNC: 82 MG/DL (ref 70–99)
HCT VFR BLD CALC: 34.5 % (ref 36–48)
HEMOGLOBIN: 11.7 G/DL (ref 12–16)
LYMPHOCYTES ABSOLUTE: 1 K/UL (ref 1–5.1)
LYMPHOCYTES RELATIVE PERCENT: 24.4 %
MCH RBC QN AUTO: 29.8 PG (ref 26–34)
MCHC RBC AUTO-ENTMCNC: 34 G/DL (ref 31–36)
MCV RBC AUTO: 87.5 FL (ref 80–100)
MONOCYTES ABSOLUTE: 0.4 K/UL (ref 0–1.3)
MONOCYTES RELATIVE PERCENT: 10.2 %
NEUTROPHILS ABSOLUTE: 2.6 K/UL (ref 1.7–7.7)
NEUTROPHILS RELATIVE PERCENT: 61.6 %
PDW BLD-RTO: 13.2 % (ref 12.4–15.4)
PLATELET # BLD: 191 K/UL (ref 135–450)
PMV BLD AUTO: 9.7 FL (ref 5–10.5)
POTASSIUM SERPL-SCNC: 4 MMOL/L (ref 3.5–5.1)
RBC # BLD: 3.94 M/UL (ref 4–5.2)
SODIUM BLD-SCNC: 138 MMOL/L (ref 136–145)
WBC # BLD: 4.2 K/UL (ref 4–11)

## 2021-01-06 PROCEDURE — PREOPEXAM PRE-OP EXAM: Performed by: OBSTETRICS & GYNECOLOGY

## 2021-01-07 NOTE — PROGRESS NOTES
Fercho Nayely    Age 35 y.o.    female    1990    MRN [id number]    1/15/2021  Arrival Time_____________  OR Time____________[address]     Procedure(s):  LOOP ELECTROSURGICAL EXCISION PROCEDURE, COLPOSCOPY, CERVICAL CONIZATION                      General    Surgeon(s):  Jeremy Ortiz, DO       Phone [phone] ([place])     [address]  Cell Work  _________________________________________________________________  _________________________________________________________________  _________________________________________________________________  _________________________________________________________________  _________________________________________________________________      PCP _____________________________ Phone_________________       H&P__________________Bringing    Chart            Epic  DOS     Called_______  EKG__________________Bringing    Chart            Epic  DOS     Called_______  LAB__________________ Bringing    Chart            Epic  DOS     Called_______  Cardiac Clearance_______Bringing    Chart            Epic      DOS       Called_______    Cardiologist________________________ Phone___________________________      ? Jain concerns / Waiver on Chart            PAT Communications_____________  ? Pre-op Instructions Given [place]          ______________________________  ? Directions to Surgery Center                          ______________________________  ? Transportation Home_______________      _______________________________  ?  Crutches/Walker__________________        _______________________________      ________Pre-op Orders   _______Transcribed    _______Wt.  ________Pharmacy          _______SCD  ______VTE     ______Beta Blocker  ________Consent             ________TED Veronica [place]

## 2021-01-08 ENCOUNTER — OFFICE VISIT (OUTPATIENT)
Dept: PRIMARY CARE CLINIC | Age: 31
End: 2021-01-08
Payer: OTHER GOVERNMENT

## 2021-01-08 DIAGNOSIS — Z01.818 PREOP TESTING: Primary | ICD-10-CM

## 2021-01-08 PROCEDURE — 99211 OFF/OP EST MAY X REQ PHY/QHP: CPT | Performed by: NURSE PRACTITIONER

## 2021-01-08 NOTE — PATIENT INSTRUCTIONS

## 2021-01-09 LAB — SARS-COV-2: DETECTED

## 2021-01-13 NOTE — PROGRESS NOTES
Obstructive Sleep Apnea (PALMA) Screening     Patient:  Berlin Hernandez    YOB: 1990      Medical Record #:  [id number]                     Date:  1/13/2021     1. Are you a loud and/or regular snorer? []  Yes       [x] No    2. Have you been observed to gasp or stop breathing during sleep? []  Yes       [x] No    3. Do you feel tired or groggy upon awakening or do you awaken with a headache?           []  Yes       [] No    4. Are you often tired or fatigued during the wake time hours? []  Yes       [] No    5. Do you fall asleep sitting, reading, watching TV or driving? []  Yes       [] No    6. Do you often have problems with memory or concentration? []  Yes       [] No    **If patient's score is ? 3 they are considered high risk for PALMA. An Anesthesia provider will evaluate the patient and develop a plan of care the day of surgery. Note:  If the patient's BMI is more than 35 kg m¯² , has neck circumference > 40 cm, and/or high blood pressure the risk is greater (© American [employer]nea Association, 2006).

## 2021-01-13 NOTE — PROGRESS NOTES
Preoperative Screening for Elective Surgery/Invasive Procedures While COVID-19 present in the community     Have you tested positive or have been told to self-isolate for COVID-19 like symptoms within the past 28 days? yes   Do you currently have any of the following symptoms? No  o Fever >100.0 F or 99.9 F in immunocompromised patients? No  o New onset cough, shortness of breath or difficulty breathing? No  o New onset sore throat, myalgia (muscle aches and pains), headache, loss of taste/smell or diarrhea? No   Have you had a potential exposure to COVID-19 within the past 14 days by:  o Close contact with a confirmed case? No  o Close contact with a healthcare worker,  or essential infrastructure worker (grocery store, [employer], gas station, public utilities or transportation)? No  o Do you reside in a congregate setting such as; skilled nursing facility, adult home, correctional facility, homeless shelter or other institutional setting? No  o Have you had recent travel to a known COVID-19 hotspot? No    Indicate if the patient has a positive screen by answering yes to one or more of the above questions. Patients who test positive or screen positive prior to surgery or on the day of surgery should be evaluated in conjunction with the surgeon/proceduralist/anesthesiologist to determine the urgency of the procedure.

## 2021-01-15 ENCOUNTER — HOSPITAL ENCOUNTER (OUTPATIENT)
Age: 31
Setting detail: OUTPATIENT SURGERY
Discharge: HOME | End: 2021-01-15
Attending: OBSTETRICS & GYNECOLOGY | Admitting: OBSTETRICS & GYNECOLOGY
Payer: OTHER GOVERNMENT

## 2021-01-15 ENCOUNTER — ANESTHESIA EVENT (OUTPATIENT)
Dept: OPERATING ROOM | Age: 31
End: 2021-01-15
Payer: OTHER GOVERNMENT

## 2021-01-15 ENCOUNTER — ANESTHESIA (OUTPATIENT)
Dept: OPERATING ROOM | Age: 31
End: 2021-01-15
Payer: OTHER GOVERNMENT

## 2021-01-15 VITALS
SYSTOLIC BLOOD PRESSURE: 101 MMHG | WEIGHT: 130 LBS | OXYGEN SATURATION: 100 % | DIASTOLIC BLOOD PRESSURE: 61 MMHG | BODY MASS INDEX: 20.89 KG/M2 | RESPIRATION RATE: 16 BRPM | HEART RATE: 77 BPM | TEMPERATURE: 97.1 F | HEIGHT: 66 IN

## 2021-01-15 VITALS
RESPIRATION RATE: 3 BRPM | DIASTOLIC BLOOD PRESSURE: 37 MMHG | SYSTOLIC BLOOD PRESSURE: 83 MMHG | OXYGEN SATURATION: 100 %

## 2021-01-15 DIAGNOSIS — Z98.890 S/P CONIZATION OF CERVIX: Primary | ICD-10-CM

## 2021-01-15 DIAGNOSIS — R87.613 HIGH GRADE SQUAMOUS INTRAEPITHELIAL CERVICAL DYSPLASIA: ICD-10-CM

## 2021-01-15 LAB — PREGNANCY, URINE: NEGATIVE

## 2021-01-15 PROCEDURE — 3700000001 HC ADD 15 MINUTES (ANESTHESIA): Performed by: OBSTETRICS & GYNECOLOGY

## 2021-01-15 PROCEDURE — 3600000013 HC SURGERY LEVEL 3 ADDTL 15MIN: Performed by: OBSTETRICS & GYNECOLOGY

## 2021-01-15 PROCEDURE — 57520 CONIZATION OF CERVIX: CPT | Performed by: OBSTETRICS & GYNECOLOGY

## 2021-01-15 PROCEDURE — 3700000000 HC ANESTHESIA ATTENDED CARE: Performed by: OBSTETRICS & GYNECOLOGY

## 2021-01-15 PROCEDURE — 88307 TISSUE EXAM BY PATHOLOGIST: CPT

## 2021-01-15 PROCEDURE — 7100000010 HC PHASE II RECOVERY - FIRST 15 MIN: Performed by: OBSTETRICS & GYNECOLOGY

## 2021-01-15 PROCEDURE — 7100000001 HC PACU RECOVERY - ADDTL 15 MIN: Performed by: OBSTETRICS & GYNECOLOGY

## 2021-01-15 PROCEDURE — 7100000011 HC PHASE II RECOVERY - ADDTL 15 MIN: Performed by: OBSTETRICS & GYNECOLOGY

## 2021-01-15 PROCEDURE — 2500000003 HC RX 250 WO HCPCS

## 2021-01-15 PROCEDURE — 88305 TISSUE EXAM BY PATHOLOGIST: CPT

## 2021-01-15 PROCEDURE — 84703 CHORIONIC GONADOTROPIN ASSAY: CPT

## 2021-01-15 PROCEDURE — 3600000003 HC SURGERY LEVEL 3 BASE: Performed by: OBSTETRICS & GYNECOLOGY

## 2021-01-15 PROCEDURE — 7100000000 HC PACU RECOVERY - FIRST 15 MIN: Performed by: OBSTETRICS & GYNECOLOGY

## 2021-01-15 PROCEDURE — 6360000002 HC RX W HCPCS

## 2021-01-15 PROCEDURE — 2580000003 HC RX 258: Performed by: ANESTHESIOLOGY

## 2021-01-15 PROCEDURE — 2709999900 HC NON-CHARGEABLE SUPPLY: Performed by: OBSTETRICS & GYNECOLOGY

## 2021-01-15 PROCEDURE — 6370000000 HC RX 637 (ALT 250 FOR IP): Performed by: OBSTETRICS & GYNECOLOGY

## 2021-01-15 PROCEDURE — 2580000003 HC RX 258: Performed by: OBSTETRICS & GYNECOLOGY

## 2021-01-15 PROCEDURE — 2500000003 HC RX 250 WO HCPCS: Performed by: OBSTETRICS & GYNECOLOGY

## 2021-01-15 PROCEDURE — 6360000002 HC RX W HCPCS: Performed by: OBSTETRICS & GYNECOLOGY

## 2021-01-15 RX ORDER — OXYCODONE HYDROCHLORIDE AND ACETAMINOPHEN 5; 325 MG/1; MG/1
1 TABLET ORAL PRN
Status: DISCONTINUED | OUTPATIENT
Start: 2021-01-15 | End: 2021-01-15 | Stop reason: HOSPADM

## 2021-01-15 RX ORDER — HYDRALAZINE HYDROCHLORIDE 20 MG/ML
5 INJECTION INTRAMUSCULAR; INTRAVENOUS EVERY 10 MIN PRN
Status: DISCONTINUED | OUTPATIENT
Start: 2021-01-15 | End: 2021-01-15 | Stop reason: HOSPADM

## 2021-01-15 RX ORDER — KETOROLAC TROMETHAMINE 30 MG/ML
INJECTION, SOLUTION INTRAMUSCULAR; INTRAVENOUS PRN
Status: DISCONTINUED | OUTPATIENT
Start: 2021-01-15 | End: 2021-01-15 | Stop reason: SDUPTHER

## 2021-01-15 RX ORDER — DEXAMETHASONE SODIUM PHOSPHATE 10 MG/ML
INJECTION INTRAMUSCULAR; INTRAVENOUS PRN
Status: DISCONTINUED | OUTPATIENT
Start: 2021-01-15 | End: 2021-01-15 | Stop reason: SDUPTHER

## 2021-01-15 RX ORDER — DIPHENHYDRAMINE HYDROCHLORIDE 50 MG/ML
12.5 INJECTION INTRAMUSCULAR; INTRAVENOUS
Status: DISCONTINUED | OUTPATIENT
Start: 2021-01-15 | End: 2021-01-15 | Stop reason: HOSPADM

## 2021-01-15 RX ORDER — MAGNESIUM HYDROXIDE 1200 MG/15ML
LIQUID ORAL CONTINUOUS PRN
Status: COMPLETED | OUTPATIENT
Start: 2021-01-15 | End: 2021-01-15

## 2021-01-15 RX ORDER — LIDOCAINE HYDROCHLORIDE 10 MG/ML
INJECTION, SOLUTION INFILTRATION; PERINEURAL PRN
Status: DISCONTINUED | OUTPATIENT
Start: 2021-01-15 | End: 2021-01-15 | Stop reason: ALTCHOICE

## 2021-01-15 RX ORDER — LIDOCAINE HYDROCHLORIDE 20 MG/ML
INJECTION, SOLUTION INFILTRATION; PERINEURAL PRN
Status: DISCONTINUED | OUTPATIENT
Start: 2021-01-15 | End: 2021-01-15 | Stop reason: SDUPTHER

## 2021-01-15 RX ORDER — PROMETHAZINE HYDROCHLORIDE 25 MG/ML
6.25 INJECTION, SOLUTION INTRAMUSCULAR; INTRAVENOUS
Status: DISCONTINUED | OUTPATIENT
Start: 2021-01-15 | End: 2021-01-15 | Stop reason: HOSPADM

## 2021-01-15 RX ORDER — PROPOFOL 10 MG/ML
INJECTION, EMULSION INTRAVENOUS PRN
Status: DISCONTINUED | OUTPATIENT
Start: 2021-01-15 | End: 2021-01-15 | Stop reason: SDUPTHER

## 2021-01-15 RX ORDER — LABETALOL HYDROCHLORIDE 5 MG/ML
5 INJECTION, SOLUTION INTRAVENOUS EVERY 10 MIN PRN
Status: DISCONTINUED | OUTPATIENT
Start: 2021-01-15 | End: 2021-01-15 | Stop reason: HOSPADM

## 2021-01-15 RX ORDER — OXYCODONE HYDROCHLORIDE AND ACETAMINOPHEN 5; 325 MG/1; MG/1
2 TABLET ORAL PRN
Status: DISCONTINUED | OUTPATIENT
Start: 2021-01-15 | End: 2021-01-15 | Stop reason: HOSPADM

## 2021-01-15 RX ORDER — OXYCODONE HYDROCHLORIDE AND ACETAMINOPHEN 5; 325 MG/1; MG/1
1 TABLET ORAL EVERY 6 HOURS PRN
Qty: 20 TABLET | Refills: 0 | Status: SHIPPED | OUTPATIENT
Start: 2021-01-15 | End: 2021-01-22

## 2021-01-15 RX ORDER — ACETIC ACID 0.25 G/100ML
IRRIGANT IRRIGATION PRN
Status: DISCONTINUED | OUTPATIENT
Start: 2021-01-15 | End: 2021-01-15 | Stop reason: ALTCHOICE

## 2021-01-15 RX ORDER — LEVOFLOXACIN 5 MG/ML
500 INJECTION, SOLUTION INTRAVENOUS ONCE
Status: COMPLETED | OUTPATIENT
Start: 2021-01-15 | End: 2021-01-15

## 2021-01-15 RX ORDER — ONDANSETRON 2 MG/ML
4 INJECTION INTRAMUSCULAR; INTRAVENOUS PRN
Status: DISCONTINUED | OUTPATIENT
Start: 2021-01-15 | End: 2021-01-15 | Stop reason: HOSPADM

## 2021-01-15 RX ORDER — SODIUM CHLORIDE 0.9 % (FLUSH) 0.9 %
10 SYRINGE (ML) INJECTION EVERY 12 HOURS SCHEDULED
Status: DISCONTINUED | OUTPATIENT
Start: 2021-01-15 | End: 2021-01-15 | Stop reason: HOSPADM

## 2021-01-15 RX ORDER — MORPHINE SULFATE 2 MG/ML
2 INJECTION, SOLUTION INTRAMUSCULAR; INTRAVENOUS EVERY 5 MIN PRN
Status: DISCONTINUED | OUTPATIENT
Start: 2021-01-15 | End: 2021-01-15 | Stop reason: HOSPADM

## 2021-01-15 RX ORDER — ONDANSETRON 4 MG/1
4 TABLET, ORALLY DISINTEGRATING ORAL EVERY 8 HOURS PRN
Qty: 20 TABLET | Refills: 1 | Status: SHIPPED | OUTPATIENT
Start: 2021-01-15 | End: 2022-01-09

## 2021-01-15 RX ORDER — LEVOFLOXACIN 5 MG/ML
INJECTION, SOLUTION INTRAVENOUS
Status: COMPLETED
Start: 2021-01-15 | End: 2021-01-15

## 2021-01-15 RX ORDER — ONDANSETRON 2 MG/ML
INJECTION INTRAMUSCULAR; INTRAVENOUS PRN
Status: DISCONTINUED | OUTPATIENT
Start: 2021-01-15 | End: 2021-01-15 | Stop reason: SDUPTHER

## 2021-01-15 RX ORDER — MEPERIDINE HYDROCHLORIDE 50 MG/ML
12.5 INJECTION INTRAMUSCULAR; INTRAVENOUS; SUBCUTANEOUS EVERY 5 MIN PRN
Status: DISCONTINUED | OUTPATIENT
Start: 2021-01-15 | End: 2021-01-15 | Stop reason: HOSPADM

## 2021-01-15 RX ORDER — SODIUM CHLORIDE, SODIUM LACTATE, POTASSIUM CHLORIDE, CALCIUM CHLORIDE 600; 310; 30; 20 MG/100ML; MG/100ML; MG/100ML; MG/100ML
INJECTION, SOLUTION INTRAVENOUS CONTINUOUS
Status: DISCONTINUED | OUTPATIENT
Start: 2021-01-15 | End: 2021-01-15 | Stop reason: HOSPADM

## 2021-01-15 RX ORDER — MORPHINE SULFATE 2 MG/ML
1 INJECTION, SOLUTION INTRAMUSCULAR; INTRAVENOUS EVERY 5 MIN PRN
Status: DISCONTINUED | OUTPATIENT
Start: 2021-01-15 | End: 2021-01-15 | Stop reason: HOSPADM

## 2021-01-15 RX ORDER — SODIUM CHLORIDE 0.9 % (FLUSH) 0.9 %
10 SYRINGE (ML) INJECTION PRN
Status: DISCONTINUED | OUTPATIENT
Start: 2021-01-15 | End: 2021-01-15 | Stop reason: HOSPADM

## 2021-01-15 RX ORDER — LIDOCAINE HYDROCHLORIDE 10 MG/ML
0.3 INJECTION, SOLUTION EPIDURAL; INFILTRATION; INTRACAUDAL; PERINEURAL
Status: DISCONTINUED | OUTPATIENT
Start: 2021-01-15 | End: 2021-01-15 | Stop reason: HOSPADM

## 2021-01-15 RX ADMIN — METRONIDAZOLE 500 MG: 500 INJECTION, SOLUTION INTRAVENOUS at 07:36

## 2021-01-15 RX ADMIN — DEXAMETHASONE SODIUM PHOSPHATE 10 MG: 10 INJECTION INTRAMUSCULAR; INTRAVENOUS at 08:36

## 2021-01-15 RX ADMIN — PROPOFOL 200 MG: 10 INJECTION, EMULSION INTRAVENOUS at 08:43

## 2021-01-15 RX ADMIN — PROPOFOL 150 MG: 10 INJECTION, EMULSION INTRAVENOUS at 08:46

## 2021-01-15 RX ADMIN — SODIUM CHLORIDE, POTASSIUM CHLORIDE, SODIUM LACTATE AND CALCIUM CHLORIDE: 600; 310; 30; 20 INJECTION, SOLUTION INTRAVENOUS at 07:17

## 2021-01-15 RX ADMIN — LIDOCAINE HYDROCHLORIDE 100 MG: 20 INJECTION, SOLUTION INFILTRATION; PERINEURAL at 08:36

## 2021-01-15 RX ADMIN — PROPOFOL 200 MG: 10 INJECTION, EMULSION INTRAVENOUS at 08:36

## 2021-01-15 RX ADMIN — KETOROLAC TROMETHAMINE 15 MG: 30 INJECTION, SOLUTION INTRAMUSCULAR; INTRAVENOUS at 09:06

## 2021-01-15 RX ADMIN — ONDANSETRON 4 MG: 2 INJECTION INTRAMUSCULAR; INTRAVENOUS at 08:36

## 2021-01-15 RX ADMIN — LEVOFLOXACIN 500 MG: 5 INJECTION, SOLUTION INTRAVENOUS at 08:37

## 2021-01-15 ASSESSMENT — PULMONARY FUNCTION TESTS
PIF_VALUE: 5
PIF_VALUE: 10
PIF_VALUE: 3
PIF_VALUE: 0
PIF_VALUE: 2
PIF_VALUE: 10
PIF_VALUE: 6
PIF_VALUE: 10
PIF_VALUE: 10
PIF_VALUE: 12
PIF_VALUE: 7
PIF_VALUE: 1
PIF_VALUE: 12
PIF_VALUE: 10
PIF_VALUE: 12
PIF_VALUE: 10
PIF_VALUE: 10
PIF_VALUE: 6
PIF_VALUE: 5
PIF_VALUE: 1
PIF_VALUE: 10
PIF_VALUE: 10

## 2021-01-15 ASSESSMENT — PAIN SCALES - GENERAL
PAINLEVEL_OUTOF10: 0

## 2021-01-15 ASSESSMENT — PAIN - FUNCTIONAL ASSESSMENT: PAIN_FUNCTIONAL_ASSESSMENT: 0-10

## 2021-01-15 NOTE — ANESTHESIA PRE PROCEDURE
Department of Anesthesiology  Preprocedure Note       Name:  Yesica Peguero   Age:  35 y.o.  :  1990                                          MRN:  [id number]         Date:  1/15/2021      Surgeon: Dhruv Al):  Lion Pacheco DO    Procedure: Procedure(s):  LOOP ELECTROSURGICAL EXCISION PROCEDURE, COLPOSCOPY, CERVICAL CONIZATION    Medications prior to admission:   Prior to Admission medications    Medication Sig Start Date End Date Taking?  Authorizing Provider   Probiotic Product (PROBIOTIC PO) Take by mouth daily   Yes Historical Provider, MD   Multiple Vitamin (MULTIVITAMIN PO) Take by mouth daily   Yes Historical Provider, MD   Omega-3 Fatty Acids (FISH OIL PO) Take by mouth daily   Yes Historical Provider, MD   Digestive Enzymes (DIGESTIVE ENZYME PO) Take by mouth 2 times daily   Yes Historical Provider, MD   VITAMIN D PO Take by mouth daily   Yes Historical Provider, MD   mesalamine (APRISO) 0.375 g extended release capsule TK 2 CS PO BID 20  Yes Historical Provider, MD   folic acid (V-R FOLIC ACID) 605 MCG tablet Take 1 tablet by mouth daily  Patient not taking: Reported on 2021   Lion Pacheco DO       Current medications:    Current Facility-Administered Medications   Medication Dose Route Frequency Provider Last Rate Last Admin    metroNIDAZOLE (FLAGYL) 5 mg/mL IVPB premix             levoFLOXacin (LEVAQUIN) 500 MG/100ML infusion             lactated ringers infusion   Intravenous Continuous Nola Solorzano  mL/hr at 01/15/21 0717 New Bag at 01/15/21 0717    sodium chloride flush 0.9 % injection 10 mL  10 mL Intravenous 2 times per day Nola Solorzano MD        sodium chloride flush 0.9 % injection 10 mL  10 mL Intravenous PRN Nola Solorzano MD        lidocaine PF 1 % injection 0.3 mL  0.3 mL Intradermal Once PRN Nola Solorzano MD  metronidazole (FLAGYL) 500 mg in NaCl 100 mL IVPB premix  500 mg Intravenous Once Odessia Crape Federer, DO        levoFLOXacin (LEVAQUIN) 500 MG/100ML infusion 500 mg  500 mg Intravenous Once Odessia Crape Federer, DO           Allergies:     Allergies   Allergen Reactions    Amoxicillin     Pcn [Penicillins] Anaphylaxis, Itching and Swelling     Throat starts to itch and swell       Problem List:    Patient Active Problem List   Diagnosis Code    Sjogren's syndrome ([facility] [address] M35.00    Vaccination refused by patient Z28.21    Parotid gland enlargement K11.1    Serologic abnormality R89.4    Urinary frequency R35.0    Malaise R53.81    Generalized abdominal tenderness R10.817    Crohn's disease ([facility] [address] K50.90    Allergic rhinitis J30.9    Menorrhagia with regular cycle N92.0       Past Medical History:        Diagnosis Date    Abnormal Pap smear of cervix     Autoimmune disorder ([facility] [address]     Crohn's disease ([facility] [address]     Gestational diabetes 2018    Sjogren's syndrome ([facility] [address]        Past Surgical History:        Procedure Laterality Date    COLONOSCOPY      WISDOM TOOTH EXTRACTION  2008       Social History:    Social History     Tobacco Use    Smoking status: Never Smoker    Smokeless tobacco: Never Used   Substance Use Topics    Alcohol use: Never     Frequency: Never                                Counseling given: Not Answered      Vital Signs (Current):   Vitals:    01/13/21 0910 01/15/21 0714   BP:  106/70   Pulse:  91   Resp:  18   Temp:  98 °F (36.7 °C)   TempSrc:  Temporal   SpO2:  99%   Weight: 130 lb (59 kg) 130 lb (59 kg)   Height: 5' 6\" (1.676 m) 5' 6\" (1.676 m)                                              BP Readings from Last 3 Encounters:   01/15/21 106/70   01/06/21 125/72   01/05/21 100/68       NPO Status: Time of last liquid consumption: 2200                        Time of last solid consumption: 2000                        Date of last liquid consumption: 01/14/21 Date of last solid food consumption: 01/14/21    BMI:   Wt Readings from Last 3 Encounters:   01/15/21 130 lb (59 kg)   01/06/21 134 lb (60.8 kg)   01/05/21 132 lb (59.9 kg)     Body mass index is 20.98 kg/m². CBC:   Lab Results   Component Value Date    WBC 4.2 01/05/2021    RBC 3.94 01/05/2021    HGB 11.7 01/05/2021    HCT 34.5 01/05/2021    MCV 87.5 01/05/2021    RDW 13.2 01/05/2021     01/05/2021       CMP:   Lab Results   Component Value Date     01/05/2021    K 4.0 01/05/2021     01/05/2021    CO2 29 01/05/2021    BUN 9 01/05/2021    CREATININE 0.6 01/05/2021    GFRAA >60 01/05/2021    AGRATIO 1.1 09/08/2020    LABGLOM >60 01/05/2021    GLUCOSE 82 01/05/2021    PROT 7.9 09/08/2020    CALCIUM 9.6 01/05/2021    BILITOT 0.4 09/08/2020    ALKPHOS 54 09/08/2020    AST 24 09/08/2020    ALT 24 09/08/2020       POC Tests: No results for input(s): POCGLU, POCNA, POCK, POCCL, POCBUN, POCHEMO, POCHCT in the last 72 hours.     Coags: No results found for: PROTIME, INR, APTT    HCG (If Applicable): No results found for: PREGTESTUR, PREGSERUM, HCG, HCGQUANT     ABGs: No results found for: PHART, PO2ART, GKO2LXF, VAF4YSZ, BEART, [id number]     Type & Screen (If Applicable):  No results found for: LABABO, LABRH    Drug/Infectious Status (If Applicable):  No results found for: HIV, HEPCAB    COVID-19 Screening (If Applicable):   Lab Results   Component Value Date    COVID19 Detected 01/08/2021         Anesthesia Evaluation  Patient summary reviewed and Nursing notes reviewed  Airway: Mallampati: I  TM distance: >3 FB   Neck ROM: full  Mouth opening: > = 3 FB Dental: normal exam         Pulmonary:Negative Pulmonary ROS and normal exam  breath sounds clear to auscultation                             Cardiovascular:Negative CV ROS            Rhythm: regular  Rate: normal                    Neuro/Psych:   Negative Neuro/Psych ROS              GI/Hepatic/Renal: ROS comment: Crohn's disease. Endo/Other:    (+) : arthritis:., .                  ROS comment: Sjogren's syndrome Abdominal:           Vascular: negative vascular ROS. Anesthesia Plan      general     ASA 2       Induction: intravenous. MIPS: Postoperative opioids intended and Prophylactic antiemetics administered. Anesthetic plan and risks discussed with patient. Plan discussed with CRNA.                   Clay Barnes MD   1/15/2021

## 2021-01-15 NOTE — PROGRESS NOTES
Preoperative Screening for Elective Surgery/Invasive Procedures While COVID-19 present in the community     Have you tested positive or have been told to self-isolate for COVID-19 like symptoms within the past 28 days? NO   Do you currently have any of the following symptoms? No  o Fever >100.0 F or 99.9 F in immunocompromised patients? No  o New onset cough, shortness of breath or difficulty breathing? No  o New onset sore throat, myalgia (muscle aches and pains), headache, loss of taste/smell or diarrhea? Yes loss of taste smell 12/20    Have you had a potential exposure to COVID-19 within the past 14 days by:  o Close contact with a confirmed case? No  o Close contact with a healthcare worker,  or essential infrastructure worker (grocery store, [employer], gas station, public utilities or transportation)? No  o Do you reside in a congregate setting such as; skilled nursing facility, adult home, correctional facility, homeless shelter or other institutional setting? No  o Have you had recent travel to a known COVID-19 hotspot? No    Indicate if the patient has a positive screen by answering yes to one or more of the above questions. Patients who test positive or screen positive prior to surgery or on the day of surgery should be evaluated in conjunction with the surgeon/proceduralist/anesthesiologist to determine the urgency of the procedure.

## 2021-01-15 NOTE — OP NOTE
Operative Note      Patient: Luci Scruggs  YOB: 1990  MRN: [id number]    Date of Procedure: 1/15/2021    Pre-Op Diagnosis: HIGH GRADE DYSPLASIA    Post-Op Diagnosis: Same       Procedure:  Colposcopy, cervical conization, ECC    Surgeon(s):  Nancy Swanson DO    Assistant:   Surgical Assistant: Glenna Wallace    Anesthesia: General    Estimated Blood Loss (mL): less than 50     Complications: None  Specimens:   ID Type Source Tests Collected by Time Destination   A : cervical cone (anterior- suture at 12 o'clock/ posterior -no suture) Tissue [facility],  1/15/2021 0900    B : endocervical currettings Tissue Tissue [facility],  1/15/2021 0859        Implants:  * No implants in log *      Drains: * No LDAs found *    Findings: colposcopic findings consistent with dysplasia--anterior cervix--9-3 o'clock position  Disposition:  PACU  Condition: stable  Detailed Description of Procedure:   See dictation [id number]    Electronically signed by Nancy Swanson DO on 1/15/2021 at 9:08 AM

## 2021-01-15 NOTE — DISCHARGE INSTR - DIET

## 2021-01-16 NOTE — OP NOTE
[facility]                 Anuja Morgan                                OPERATIVE REPORT    PATIENT NAME: Dhiraj Shabazz                   :        1990  MED REC NO:   [id number]                          ROOM:  ACCOUNT NO:   [de-identified]                           ADMIT DATE: 01/15/2021  PROVIDER:     Abelardo Thompson DO    DATE OF PROCEDURE:  01/15/2021    PREOPERATIVE DIAGNOSIS:  High-grade cervical dysplasia (colposcopic biopsy). POSTOPERATIVE DIAGNOSES:  High-grade cervical dysplasia with colposcopic findings consistent with dysplasia. PROCEDURE:  1.  Colposcopy. 2.  Cervical conization. 3.  ECC. SURGEON:  Abelardo Thompson DO    ANESTHESIA:  General endotracheal anesthetic. ESTIMATED BLOOD LOSS:  Less than 50 mL. COMPLICATIONS:  None. SPECIMENS:  1. Cervical conization with suture marking the anterior aspect  of the cervix at the 12 o'clock position, separate specimens including unmarked posterior aspect of cervix   2. Endocervical curettings. FINDINGS:  Colposcopic findings consistent with dysplasia of the  anterior cervix located at the 9 o'clock to 3 o'clock position. DISPOSITION:  To PACU. CONDITION:  Stable. INDICATIONS:  The patient is a 42-year-old  2, para 2 female who  presents for definitive therapy secondary to abnormal findings on  Colposcopy performed on 2020. The patient was found to have ANTONIA 2  high-grade squamous intraepithelial lesion involving the cervical  transformation zone at the 9 o'clock position as well as at the 12  o'clock position where biopsies were performed. ECC was found to be  negative at that time. Due to the high-grade dysplasia findings,  options were reviewed and colposcopy and conization was decided upon  after risks and benefits were discussed. Written informed consent was  obtained. OPERATIVE PROCEDURE:  The patient was taken to the OR on 01/15. She was  given preoperative antibiotics and bilateral lower extremity SCDs were  placed. She was taken to the OR and placed under general endotracheal  anesthetic and was positioned in the dorsal lithotomy position. A  colposcopy was then performed. The external genitalia were examined and  found to be essentially normal.    The speculum was then placed. The cervix and upper vagina were  evaluated and utilizing the colposcope with both white and green light,  the colposcopy was found to be adequate. The squamocolumnar junction  was well visualized. Acetic acid was then applied and significant changes were noted ranging from the 9 o'clock to the 3  o'clock position in a clockwise orientation. No further acetowhite changes were noted and no significant vascular  changes were identified. The colposcopy was then discontinued and the patient was prepped and draped  in the usual fashion. Upon prepping and draping the patient, the cervix  was then circumferentially infiltrated with the lidocaine, 10 mL was  utilized. Two sutures were then placed, one at the 3 o'clock and one at  the 9 o'clock position using 0 Vicryl. After placement of sutures, a  conization was then performed with emphasis and focus on the colposcopically identified  areas of dysplasia. The 12 o'clock position of the  specimen was marked. The specimen was removed in two pieces and sent as  one specimen. A gentle ECC was then performed and sent as a separate  sampling. Upon completion of the conization, cauterization was performed at  the surface of the cone throughout. Excellent hemostasis was noted. Monsel's was applied on a limited basis for further hemostasis and again  excellent hemostasis was assured. After confirming hemostasis, all instruments were removed.   Hemostasis  was noted from the anterior lip of the cervix as well where the single-tooth tenaculum had been placed. Sponge, lap and needle counts  were correct x2. The patient tolerated the procedure well and was taken to the PACU in  stable condition.         Lisa Sanches DO    D: 01/15/2021 10:25:13       T: 01/15/2021 14:58:21     [email or web address]  Job#: [id number]     Doc#: [id number]    CC:

## 2021-01-17 ASSESSMENT — ENCOUNTER SYMPTOMS: SHORTNESS OF BREATH: 0

## 2021-01-18 NOTE — PROGRESS NOTES
Subjective:      Patient ID: Becki Clark is a 35 y.o. female. HPI  34 y/o  female presents for preoperative visit. Patient is scheduled for colposcopy and cervical conization secondary to HGSIL (CIN2)  on colposcopy, ECC, and cervical biopsies. Pap smear on 20 indicated ASCUS findings with positive HPV. 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 Menarche occurred age 16. Menses occur every month x 5-7 days, heavy 1st couple days (tampon and pad). FDLMP:  2020. Declines contraception. History is positive for left ovarian cyst.  Denies known history of pelvic infection, uterine fibroid and endometriosis. Medical history is significant for gestational diabetes in 2nd pregnancy and new diagnosis of Crohns and Sjogrens in the last 3 months. Review of Systems   Constitutional: Negative. Negative for activity change, appetite change, chills, fatigue, fever and unexpected weight change. Respiratory: Negative for shortness of breath. Cardiovascular: Negative for chest pain. Genitourinary: Negative for difficulty urinating, dysuria, hematuria, pelvic pain, vaginal bleeding, vaginal discharge and vaginal pain. Psychiatric/Behavioral: Negative. Objective:   Physical Exam  Vitals signs and nursing note reviewed. Exam conducted with a chaperone present. Constitutional:       General: She is not in acute distress. Appearance: Normal appearance. She is well-developed. She is not ill-appearing, toxic-appearing or diaphoretic. Pulmonary:      Effort: Pulmonary effort is normal.   Skin:     General: Skin is warm and dry. Neurological:      Mental Status: She is alert and oriented to person, place, and time. Psychiatric:         Behavior: Behavior normal.         Thought Content:  Thought content normal.         Judgment: Judgment normal.         Assessment:       Diagnosis Orders 1. Preop testing     2. High grade squamous intraepithelial lesion (HGSIL), grade 2 ANTONIA, on biopsy of cervix     3. Sjogren's syndrome, with unspecified organ involvement ([facility] [address]             Plan:      Options for treatment reviewed. Risks and benefits of upcoming surgery reviewed. Written consent obtained.     Follow up for surgery        Vinicius Toney DO

## 2021-01-29 ENCOUNTER — OFFICE VISIT (OUTPATIENT)
Dept: OBGYN CLINIC | Age: 31
End: 2021-01-29

## 2021-01-29 VITALS
TEMPERATURE: 97.4 F | BODY MASS INDEX: 20.47 KG/M2 | HEART RATE: 78 BPM | SYSTOLIC BLOOD PRESSURE: 118 MMHG | WEIGHT: 126.8 LBS | DIASTOLIC BLOOD PRESSURE: 66 MMHG

## 2021-01-29 DIAGNOSIS — Z09 POSTOP CHECK: Primary | ICD-10-CM

## 2021-01-29 DIAGNOSIS — M35.00 SJOGREN'S SYNDROME, WITH UNSPECIFIED ORGAN INVOLVEMENT (HCC): ICD-10-CM

## 2021-01-29 DIAGNOSIS — Z98.890 S/P CONIZATION OF CERVIX: ICD-10-CM

## 2021-01-29 DIAGNOSIS — K50.919 CROHN'S DISEASE WITH COMPLICATION, UNSPECIFIED GASTROINTESTINAL TRACT LOCATION (HCC): ICD-10-CM

## 2021-01-29 DIAGNOSIS — N92.0 MENORRHAGIA WITH REGULAR CYCLE: ICD-10-CM

## 2021-01-29 PROCEDURE — 99024 POSTOP FOLLOW-UP VISIT: CPT | Performed by: OBSTETRICS & GYNECOLOGY

## 2021-01-30 ASSESSMENT — ENCOUNTER SYMPTOMS
SHORTNESS OF BREATH: 0
ABDOMINAL PAIN: 0
DIARRHEA: 0
NAUSEA: 0
VOMITING: 0
ABDOMINAL DISTENTION: 1
CONSTIPATION: 0

## 2021-01-31 NOTE — PROGRESS NOTES
Subjective:      Patient ID: Ruperto Isabel is a 35 y.o. female. HPI  34 y/o  female presents for post-operative visit. Patient is 2 weeks s/p colposcopy, cervical conization secondary to HGSIL (CIN2)  on colposcopy, ECC, and cervical biopsies. Pap smear on 20 indicated ASCUS findings with positive HPV. 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 Has done well since surgery. Experienced bloating after conization. Feels symptoms were most likely due to onset of menses. Admits to recurrent issues with vaginitis--bacterial vaginosis and yeast vaginitis. Recently started several vitamins. Interested in future child bearing. Aware that pregnancy spacing is recommended after conization. Menarche occurred age 16. Menses occur every month x 5-7 days, heavy 1st couple days (tampon and pad). FDLMP:  2020. Declines contraception. History is positive for left ovarian cyst.  Denies known history of pelvic infection, uterine fibroid and endometriosis. Medical history is significant for gestational diabetes in 2nd pregnancy and new diagnosis of Crohns and Sjogrens in the last 4 months. Review of Systems   Constitutional: Negative. Negative for activity change, appetite change, chills, fatigue, fever and unexpected weight change. Respiratory: Negative for shortness of breath. Cardiovascular: Negative for chest pain. Gastrointestinal: Positive for abdominal distention. Negative for abdominal pain, constipation, diarrhea, nausea and vomiting. Genitourinary: Negative for difficulty urinating, dysuria, hematuria, menstrual problem, pelvic pain, vaginal bleeding, vaginal discharge and vaginal pain. Psychiatric/Behavioral: Negative. Objective:   Physical Exam  Vitals signs and nursing note reviewed. Constitutional:       General: She is not in acute distress.      Appearance: She is well-developed. She is not ill-appearing, toxic-appearing or diaphoretic. Pulmonary:      Effort: Pulmonary effort is normal.   Skin:     General: Skin is warm and dry. Neurological:      Mental Status: She is alert and oriented to person, place, and time. Psychiatric:         Behavior: Behavior normal.         Thought Content: Thought content normal.         Judgment: Judgment normal.         Assessment:       Diagnosis Orders   1. Postop check     2. S/P conization of cervix     3. Menorrhagia with regular cycle     4. Crohn's disease with complication, unspecified gastrointestinal tract location ([facility] [address]     5. Sjogren's syndrome, with unspecified organ involvement ([facility] [address]             Plan:      Consider boric acid if recurrent vaginitis persists. Follow up prn and 2 weeks for postop visit.            You Pacheco DO

## 2021-02-17 ENCOUNTER — OFFICE VISIT (OUTPATIENT)
Dept: OBGYN CLINIC | Age: 31
End: 2021-02-17

## 2021-02-17 VITALS
HEART RATE: 82 BPM | BODY MASS INDEX: 21.73 KG/M2 | SYSTOLIC BLOOD PRESSURE: 120 MMHG | WEIGHT: 134.6 LBS | TEMPERATURE: 97.9 F | DIASTOLIC BLOOD PRESSURE: 70 MMHG

## 2021-02-17 DIAGNOSIS — Z98.890 S/P CONIZATION OF CERVIX: ICD-10-CM

## 2021-02-17 DIAGNOSIS — N87.1 DYSPLASIA OF CERVIX, HIGH GRADE CIN 2: ICD-10-CM

## 2021-02-17 DIAGNOSIS — Z09 POSTOP CHECK: Primary | ICD-10-CM

## 2021-02-17 PROCEDURE — 99024 POSTOP FOLLOW-UP VISIT: CPT | Performed by: OBSTETRICS & GYNECOLOGY

## 2021-02-21 PROBLEM — N87.1 DYSPLASIA OF CERVIX, HIGH GRADE CIN 2: Status: ACTIVE | Noted: 2021-02-21

## 2021-02-21 PROBLEM — R85.613: Status: ACTIVE | Noted: 2021-02-21

## 2021-02-21 ASSESSMENT — ENCOUNTER SYMPTOMS
DIARRHEA: 0
CONSTIPATION: 0
ABDOMINAL PAIN: 0
SHORTNESS OF BREATH: 0
NAUSEA: 0
VOMITING: 0
ABDOMINAL DISTENTION: 0

## 2021-02-21 NOTE — PROGRESS NOTES
Subjective:      Patient ID: Ruperto Isabel is a 35 y.o. female. HPI  36 y/o  female presents for post-operative visit. Patient is 5 weeks s/p colposcopy, cervical conization secondary to HGSIL (CIN2)  on colposcopy, ECC, and cervical biopsies. Pap smear on 20 indicated ASCUS findings with positive HPV. 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 Has done well since surgery. Admits to recurrent issues with vaginitis--bacterial vaginosis and yeast vaginitis. Interested in future child bearing. Aware that pregnancy spacing is recommended after conization. Menarche occurred age 16. Menses occur every month x 5-7 days, heavy 1st couple days (tampon and pad). FDLMP:  2020. Declines contraception. History is positive for left ovarian cyst.  Denies known history of pelvic infection, uterine fibroid and endometriosis. Medical history is significant for gestational diabetes in 2nd pregnancy and new diagnosis of Crohns and Sjogrens in the last 4 months. Review of Systems   Constitutional: Negative. Negative for activity change, appetite change, chills, fatigue, fever and unexpected weight change. Respiratory: Negative for shortness of breath. Cardiovascular: Negative for chest pain. Gastrointestinal: Negative for abdominal distention, abdominal pain, constipation, diarrhea, nausea and vomiting. Genitourinary: Positive for vaginal discharge. Negative for decreased urine volume, difficulty urinating, dysuria, hematuria, vaginal bleeding and vaginal pain. Psychiatric/Behavioral: Negative. Objective:   Physical Exam  Vitals signs and nursing note reviewed. Exam conducted with a chaperone present. Constitutional:       General: She is not in acute distress. Appearance: She is well-developed. She is not ill-appearing, toxic-appearing or diaphoretic.    Pulmonary:      Effort: Pulmonary effort is normal.   Genitourinary:     General: Normal vulva. Labia:         Right: No rash, tenderness, lesion or injury. Left: No rash, tenderness, lesion or injury. Vagina: No signs of injury and foreign body. No vaginal discharge, erythema, tenderness, bleeding or lesions. Cervix: Discharge present. No cervical motion tenderness, friability, lesion, erythema or cervical bleeding. Comments: Conization bed healing well. 3 oclock and 9 oclock sutures still present. No apparentt signs of infection or compromise. Skin:     General: Skin is warm and dry. Neurological:      Mental Status: She is alert and oriented to person, place, and time. Psychiatric:         Behavior: Behavior normal.         Thought Content: Thought content normal.         Judgment: Judgment normal.     2021  2:47 PM - [place] Incoming Lab Results From Soft (Epic Adt)    Narrative  Performed by: Ceasar Mckeon                                   [address] [place] [address]                                   Fax [phone]   [phone]   Department of Pathology   FINAL SURGICAL PATHOLOGY REPORT   Patient Name: Steven Gary           Accession No:  [id number]    Age Sex:   1990    30 Y / F      Location:      [facility]   Account No:   [de-identified]                 Collected:     01/15/2021   Med Rec No:    [id number]                Received:      01/15/2021   Attend Phys:   Radha Barker DO        Completed:     2021   Perform Phys: Radha Barker DO           Technical processing at [facility], [address] [place] [address]  Phone [phone]     FINAL DIAGNOSIS:     A. Cervix, LEEP conization:   - High-grade squamous intraepithelial lesion (HSIL, ANTONIA 2).    - Endocervical and ectocervical margins positive for HSIL. B. Endocervix, curettings:   - Fragments of benign endocervical mucosa. - Negative for dysplasia or malignancy.    BUCCA/BUCCA       Preoperative Diagnosis:  High grade dysplasia   Postoperative Diagnosis:  High grade dysplasia     SPECIMEN:   A.  CERVICAL, CONE, ANTERIOR [id number], POSTERIOR NONE   B.  ENDOCERVICAL CURETTINGS     GROSS DESCRIPTION:     A. The specimen is received in formalin, labeled with the patient's name   Gayle Keon R\" and additionally labeled \"cervical cone   (anterior-suture at 12 o'clock/posterior-no suture). \" The specimen   consists of 2 semi-annular fragments of pink-tan, glistening cervical   tissue. The tissue fragments have reapproximated dimensions of 1.8 x 1.6   x 0.3 cm. Per the requisition, there is a suture on one tissue fragment   designating 12 o'clock anterior. The specimen is consistent with LEEP   conization fragments. The ectocervical margins are inked orange and the   endocervical margins are inked black. The specimen is sectioned and   submitted entirely as follows:     A1: 12-3 o'clock anterior cervix   A2: 3-6 o'clock cervix   A3: 6-9 o'clock cervix   A4: 9-12 o'clock cervix     B. The specimen is received in formalin, labeled with the patient's name   Gayle Keon R\" and additionally labeled \"endocervical curettings. \"   The specimen consists of a 1.1 x 0.8 x 0.2 cm aggregate of tan-brown,   hemorrhagic, thick mucus. \" The specimen is submitted in toto in cassette   B.   SHAYY/ENID     MICROSCOPIC DESCRIPTION: Microscopic examination performed.      CPT: [id number] X1   [id number] X1     Case signed out at [facility], [address]   [place], [address] processing at [facility], [facility] [address] [place] [address]  Phone [phone]         Araceli Otero M.D., M.S.   (Electronic Signature)   01/18/2021                                                                      Page 1 of 1 Assessment:       Diagnosis Orders   1. Postop check     2. S/P conization of cervix     3. Dysplasia of cervix, high grade ANTONIA 2             Plan:         Postop care reviewed. Follow up prn and 6 months for cervical surveillance  Initiate prenatal vitamins with folic acid.             Celestine Pacheco DO

## 2021-03-12 LAB — C-REACTIVE PROTEIN: <1

## 2021-03-15 ENCOUNTER — TELEPHONE (OUTPATIENT)
Dept: FAMILY MEDICINE CLINIC | Age: 31
End: 2021-03-15

## 2021-03-15 NOTE — TELEPHONE ENCOUNTER
I called patient and I have her scheduled for Wednesday at 10:40 with Armida Mcclain. I will call Jorge ZEPEDA tomorrow to get blood work.

## 2021-03-15 NOTE — TELEPHONE ENCOUNTER
----- Message from Doc Alexandra sent at 3/15/2021  4:37 PM EDT -----  Subject: Message to Provider    QUESTIONS  Information for Provider? Patient was returning call for an appointment   request and would like a call back  ---------------------------------------------------------------------------  --------------  [address]  What is the best way for the office to contact you? OK to leave message on   voicemail  Preferred Call Back Phone Number? [phone]  ---------------------------------------------------------------------------  --------------  SCRIPT ANSWERS  Relationship to Patient?  Self

## 2021-03-15 NOTE — TELEPHONE ENCOUNTER
----- Message from Sheela Oseguera sent at 3/15/2021  3:35 PM EDT -----  Subject: Appointment Request    Reason for Call: Routine Existing Condition Follow Up    QUESTIONS  Type of Appointment? Established Patient  Reason for appointment request? No appointments available during search  Additional Information for Provider? Pt in need of an appointment please   contact pt.  ---------------------------------------------------------------------------  --------------  CALL BACK INFO  What is the best way for the office to contact you? OK to leave message on   voicemail  Preferred Call Back Phone Number? [phone]  ---------------------------------------------------------------------------  --------------  SCRIPT ANSWERS  Relationship to Patient? Self  Appointment reason? Well Care/Follow Ups  Select a Well Care/Follow Ups appointment reason? Adult Existing Condition   Follow Up [Diabetes   CHF   COPD   Hypertension/Blood Pressure Check]  (Is the patient requesting to be seen urgently for their symptoms?)? No  Is this follow up request related to routine Diabetes Management? No  Are you having any new concerns about your existing condition? No  Have you been diagnosed with   tested for   or told that you are suspected of having COVID-19 (Coronavirus)? Yes  Did your symptoms begin or have you been tested for COVID-19 in the last   10 days? No  Have you had a fever or taken medication to treat a fever within the past   3 days? No  Have you had a cough   shortness of breath or flu-like symptoms within the past 3 days? No  Do you currently have flu-like symptoms including fever or chills   cough   shortness of breath   or difficulty breathing   or new loss of taste or smell? No  (Service Expert  click yes below to proceed with [employer] As Usual   Scheduling)?  Yes

## 2021-03-17 ENCOUNTER — VIRTUAL VISIT (OUTPATIENT)
Dept: FAMILY MEDICINE CLINIC | Age: 31
End: 2021-03-17
Payer: OTHER GOVERNMENT

## 2021-03-17 DIAGNOSIS — D70.9 NEUTROPENIA, UNSPECIFIED TYPE (HCC): Primary | ICD-10-CM

## 2021-03-17 PROCEDURE — 99442 PR PHYS/QHP TELEPHONE EVALUATION 11-20 MIN: CPT | Performed by: NURSE PRACTITIONER

## 2021-03-17 ASSESSMENT — ENCOUNTER SYMPTOMS
GASTROINTESTINAL NEGATIVE: 1
RESPIRATORY NEGATIVE: 1

## 2021-03-17 NOTE — PROGRESS NOTES
3/17/2021    TELEHEALTH EVALUATION -- Audio/Visual (During JGERR-26 public health emergency)    HPI:    Angelic Hameed (:  1990) has requested an audio/video evaluation for the following concern(s):    Pt scheduled a telephone visit to discuss her WBC done at [address] [place] [address]. She has a h/o of Sjogren's syndrome and Crohn's disease. Her CRP that was done 3/16/2021 was less than 1. Her GI doctor is concerned b/c her WBC was 3.1 and wanted her to f/u with her PCP. She has had a h/o a low WBC, which was thought to be d/t slight anemia. She denies unexplained weight loss, fatigue, unexplained bruising, oral lesions or bleeding. Her PCP did a full work up 10/2020 and her vitamin B12 and iron levels were all normal.    Review of Systems   Constitutional: Negative. HENT: Negative. Respiratory: Negative. Cardiovascular: Negative. Gastrointestinal: Negative. Skin: Negative. Neurological: Negative. Hematological: Negative. Prior to Visit Medications    Medication Sig Taking?  Authorizing Provider   ASHWAGANDHA PO Take by mouth Yes Historical Provider, MD   SACCHAROMYCES CEREVISIAE SC Inject into the skin Yes Historical Provider, MD   Probiotic Product (PROBIOTIC PO) Take by mouth daily Yes Historical Provider, MD   Multiple Vitamin (MULTIVITAMIN PO) Take by mouth daily Yes Historical Provider, MD   Omega-3 Fatty Acids (FISH OIL PO) Take by mouth daily Yes Historical Provider, MD   Digestive Enzymes (DIGESTIVE ENZYME PO) Take by mouth 2 times daily Yes Historical Provider, MD   VITAMIN D PO Take by mouth daily Yes Historical Provider, MD   folic acid (V-R FOLIC ACID) 470 MCG tablet Take 1 tablet by mouth daily Yes You Pacheco DO   mesalamine (APRISO) 0.375 g extended release capsule TK 2 CS PO BID Yes Historical Provider, MD   ondansetron (ZOFRAN-ODT) 4 MG disintegrating tablet Take 1 tablet by mouth every 8 hours as needed for Nausea  Patient not taking: Reported on 3/17/2021 Jeremy Ortiz DO       Social History     Tobacco Use    Smoking status: Never Smoker    Smokeless tobacco: Never Used   Substance Use Topics    Alcohol use: Never     Frequency: Never    Drug use: Never        Allergies   Allergen Reactions    Amoxicillin     Pcn [Penicillins] Anaphylaxis, Itching and Swelling     Throat starts to itch and swell   ,   Past Medical History:   Diagnosis Date    Abnormal Pap smear of cervix     Autoimmune disorder (HCC)     [facility]     Gestational diabetes 2018    Sjogren's syndrome ([facility]    ,   Past Surgical History:   Procedure Laterality Date    COLONOSCOPY      LEEP N/A 1/15/2021    COLPOSCOPY, CERVICAL CONIZATION, ENDOCERVICAL CURRETTINGS performed by Jeremy Ortiz DO at [address] EXTRACTION  2008   ,   Social History     Tobacco Use    Smoking status: Never Smoker    Smokeless tobacco: Never Used   Substance Use Topics    Alcohol use: Never     Frequency: Never    Drug use: Never   ,   Family History   Problem Relation Age of Onset    Diabetes Mother     Hypertension Father     No Known Problems Sister     No Known Problems Brother     Arthritis Maternal Grandmother         osteoarthritis     Heart Disease Maternal Grandfather         arrhythimia     Hypertension Maternal Grandfather     Hypertension Paternal Grandmother     Arthritis Paternal Grandmother         \"autoimmune\"     Heart Attack Paternal Grandfather         76s    No Known Problems Brother    ,   Immunization History   Administered Date(s) Administered    PPD Test 09/30/2020   ,   Health Maintenance   Topic Date Due    Hepatitis C screen  Never done    Varicella vaccine (1 of 2 - 2-dose childhood series) Never done    HIV screen  Never done    DTaP/Tdap/Td vaccine (1 - Tdap) Never done    Flu vaccine (1) Never done    Cervical cancer screen  11/11/2023    Hepatitis A vaccine  Aged Out    Hepatitis B vaccine  Aged Out    Hib vaccine  Aged Out    Meningococcal (ACWY) vaccine  Aged Out    Pneumococcal 0-64 years Vaccine  Aged Out       PHYSICAL EXAMINATION:  Telephone visit  [ INSTRUCTIONS:  \"[x]\" Indicates a positive item  \"[]\" Indicates a negative item  -- DELETE ALL ITEMS NOT EXAMINED]  Vital Signs: (As obtained by patient/caregiver or practitioner observation)    Blood pressure-  Heart rate-    Respiratory rate-    Temperature-  Pulse oximetry-     Constitutional: [] Appears well-developed and well-nourished [] No apparent distress      [] Abnormal-   Mental status  [] Alert and awake  [] Oriented to person/place/time []Able to follow commands      Eyes:  EOM    []  Normal  [] Abnormal-  Sclera  []  Normal  [] Abnormal -         Discharge []  None visible  [] Abnormal -    HENT:   [] Normocephalic, atraumatic. [] Abnormal   [] Mouth/Throat: Mucous membranes are moist.     External Ears [] Normal  [] Abnormal-     Neck: [] No visualized mass     Pulmonary/Chest: [] Respiratory effort normal.  [] No visualized signs of difficulty breathing or respiratory distress        [] Abnormal-      Musculoskeletal:   [] Normal gait with no signs of ataxia         [] Normal range of motion of neck        [] Abnormal-       Neurological:        [] No Facial Asymmetry (Cranial nerve 7 motor function) (limited exam to video visit)          [] No gaze palsy        [] Abnormal-         Skin:        [] No significant exanthematous lesions or discoloration noted on facial skin         [] Abnormal-            Psychiatric:       [] Normal Affect [] No Hallucinations        [] Abnormal-     Other pertinent observable physical exam findings-     ASSESSMENT/PLAN:  1. Neutropenia, unspecified type ([address]  See HPI. Pt has had a h/o low WBC in the past, according to previous documentation it was thought to be d/t mild anemia. Recent Vitamin B12 and iron studies were normal.  Pt denies any acute symptoms. Low WBC likely d/t IBD.   I recommended that the pt repeat the CBC in 4 weeks, if it is still abnormal, she will f/u in the office for further evaluation and testing. Pt agrees with POC and VU.       - CBC Auto Differential; Future      Return in about 4 weeks (around 4/14/2021) for Neutropenia. Orlie Mortimer, was evaluated through a synchronous (real-time) audio-video encounter. The patient (or guardian if applicable) is aware that this is a billable service. Verbal consent to proceed has been obtained within the past 12 months. The visit was conducted pursuant to the emergency declaration under the [address] [place] [address], [address] [place] [address] authority and the [employer] General Act. Patient identification was verified, and a caregiver was present when appropriate. The patient was located in a state where the provider was credentialed to provide care. Total time spent on this encounter: Not billed by time    --SHAVON Hatfield - CNP on 3/17/2021 at 11:12 AM    An electronic signature was used to authenticate this note.

## 2021-04-01 DIAGNOSIS — D70.9 NEUTROPENIA, UNSPECIFIED TYPE (HCC): ICD-10-CM

## 2021-04-01 LAB
BASOPHILS ABSOLUTE: 0 K/UL (ref 0–0.2)
BASOPHILS RELATIVE PERCENT: 0.6 %
EOSINOPHILS ABSOLUTE: 0 K/UL (ref 0–0.6)
EOSINOPHILS RELATIVE PERCENT: 1.2 %
HCT VFR BLD CALC: 33.7 % (ref 36–48)
HEMOGLOBIN: 11.7 G/DL (ref 12–16)
LYMPHOCYTES ABSOLUTE: 1.2 K/UL (ref 1–5.1)
LYMPHOCYTES RELATIVE PERCENT: 31.3 %
MCH RBC QN AUTO: 31.3 PG (ref 26–34)
MCHC RBC AUTO-ENTMCNC: 34.8 G/DL (ref 31–36)
MCV RBC AUTO: 90 FL (ref 80–100)
MONOCYTES ABSOLUTE: 0.5 K/UL (ref 0–1.3)
MONOCYTES RELATIVE PERCENT: 12.9 %
NEUTROPHILS ABSOLUTE: 2 K/UL (ref 1.7–7.7)
NEUTROPHILS RELATIVE PERCENT: 54 %
PDW BLD-RTO: 12.2 % (ref 12.4–15.4)
PLATELET # BLD: 158 K/UL (ref 135–450)
PMV BLD AUTO: 9.7 FL (ref 5–10.5)
RBC # BLD: 3.74 M/UL (ref 4–5.2)
WBC # BLD: 3.8 K/UL (ref 4–11)

## 2021-06-07 ENCOUNTER — OFFICE VISIT (OUTPATIENT)
Dept: FAMILY MEDICINE CLINIC | Age: 31
End: 2021-06-07
Payer: OTHER GOVERNMENT

## 2021-06-07 VITALS
HEART RATE: 82 BPM | DIASTOLIC BLOOD PRESSURE: 54 MMHG | RESPIRATION RATE: 14 BRPM | SYSTOLIC BLOOD PRESSURE: 96 MMHG | HEIGHT: 66 IN | TEMPERATURE: 98 F | BODY MASS INDEX: 21.05 KG/M2 | OXYGEN SATURATION: 99 % | WEIGHT: 131 LBS

## 2021-06-07 DIAGNOSIS — K50.919 CROHN'S DISEASE WITH COMPLICATION, UNSPECIFIED GASTROINTESTINAL TRACT LOCATION (HCC): ICD-10-CM

## 2021-06-07 DIAGNOSIS — Z00.00 ANNUAL PHYSICAL EXAM: Primary | ICD-10-CM

## 2021-06-07 DIAGNOSIS — D72.819 LEUKOPENIA, UNSPECIFIED TYPE: ICD-10-CM

## 2021-06-07 DIAGNOSIS — E55.9 VITAMIN D DEFICIENCY: ICD-10-CM

## 2021-06-07 DIAGNOSIS — L81.2 FRECKLES: ICD-10-CM

## 2021-06-07 DIAGNOSIS — D64.9 ANEMIA, UNSPECIFIED TYPE: ICD-10-CM

## 2021-06-07 DIAGNOSIS — Z78.9 TAKES DIETARY SUPPLEMENTS: ICD-10-CM

## 2021-06-07 PROBLEM — R35.0 URINARY FREQUENCY: Status: RESOLVED | Noted: 2020-11-11 | Resolved: 2021-06-07

## 2021-06-07 PROCEDURE — 99395 PREV VISIT EST AGE 18-39: CPT | Performed by: FAMILY MEDICINE

## 2021-06-07 ASSESSMENT — ENCOUNTER SYMPTOMS
CONSTIPATION: 0
BLOOD IN STOOL: 0
DIARRHEA: 0
BACK PAIN: 0
TROUBLE SWALLOWING: 0
VOMITING: 0
COUGH: 0
NAUSEA: 0
COLOR CHANGE: 0
SHORTNESS OF BREATH: 0

## 2021-06-07 NOTE — PROGRESS NOTES
Gerald Travis  YOB: 1990    Date of Service:  6/7/2021    Chief Complaint:   Gerald Travis is a 28 y.o. female who presents for a preventative visit     HPI:    No LMP recorded.   menstrual cycle: no changes  Sexual activity: has sex with male,    AbnormalSxs: no  Last PAP: 11/20, follows with Dr. Radha Moss      Last Mammogram: no  Family Hx of ovarian, breast, or uterine cancer: no  Self-breast exams: yes - no concerns     Previous DEXA scan: n/a    Previous Colonoscopy yes - hx of Crohn's disease   BRBR, unexplained WL, bloating, abd pain Yes  Family Hx of Colon Ca  no    Exercise: walks 5 time(s) per week  Diet: tries to eat healthy     Wt Readings from Last 3 Encounters:   06/07/21 131 lb (59.4 kg)   02/17/21 134 lb 9.6 oz (61.1 kg)   01/29/21 126 lb 12.8 oz (57.5 kg)     BP Readings from Last 3 Encounters:   06/07/21 (!) 96/54   02/17/21 120/70   01/29/21 118/66       Patient Active Problem List   Diagnosis    [facility]'s [facility]    Vaccination refused by patient    Parotid gland enlargement    Serologic abnormality    Malaise    Generalized abdominal tenderness    Crohn's disease ([facility]    Allergic rhinitis    Menorrhagia with regular cycle    S/P conization of cervix    Dysplasia of cervix, high grade ANTONIA 2       Health Maintenance   Topic Date Due    Hepatitis C screen  Never done    Varicella vaccine (1 of 2 - 2-dose childhood series) Never done    COVID-19 Vaccine (1) Never done    HIV screen  Never done    DTaP/Tdap/Td vaccine (1 - Tdap) Never done    Flu vaccine (Season Ended) 09/01/2021    Cervical cancer screen  11/11/2025    Hepatitis A vaccine  Aged Out    Hepatitis B vaccine  Aged Out    Hib vaccine  Aged Out    Meningococcal (ACWY) vaccine  Aged Out    Pneumococcal 0-64 years Vaccine  Aged [employer] History   Administered Date(s) Administered    PPD Test 09/30/2020       Allergies   Allergen Reactions    Amoxicillin     Pcn [Penicillins] Anaphylaxis, Itching and Swelling     Throat starts to itch and swell     Current Outpatient Medications   Medication Sig Dispense Refill    Probiotic Product (PROBIOTIC PO) Take by mouth daily      Multiple Vitamin (MULTIVITAMIN PO) Take by mouth daily      Omega-3 Fatty Acids (FISH OIL PO) Take by mouth daily      Digestive Enzymes (DIGESTIVE ENZYME PO) Take by mouth 2 times daily      VITAMIN D PO Take by mouth daily      folic acid (V-R FOLIC ACID) 081 MCG tablet Take 1 tablet by mouth daily 90 tablet 3    mesalamine (APRISO) 0.375 g extended release capsule TK 2 CS PO BID      ASHWAGANDHA PO Take by mouth (Patient not taking: Reported on 6/7/2021)      SACCHAROMYCES CEREVISIAE SC Inject into the skin (Patient not taking: Reported on 6/7/2021)      ondansetron (ZOFRAN-ODT) 4 MG disintegrating tablet Take 1 tablet by mouth every 8 hours as needed for Nausea (Patient not taking: Reported on 3/17/2021) 20 tablet 1     No current facility-administered medications for this visit.        Past Medical History:   Diagnosis Date    Abnormal Pap smear of cervix     [facility] disorder ([facility]     Crohn's disease ([facility]     Gestational diabetes 2018    Sjogren's syndrome ([facility]      Past Surgical History:   Procedure Laterality Date    COLONOSCOPY      LEEP N/A 1/15/2021    COLPOSCOPY, CERVICAL CONIZATION, ENDOCERVICAL CURRETTINGS performed by Uri Milian DO at [facility] EXTRACTION  2008     Family History   Problem Relation Age of Onset    Diabetes Mother     Hypertension Father     No Known Problems Sister     No Known Problems Brother     Arthritis Maternal Grandmother         osteoarthritis     Heart Disease Maternal Grandfather         arrhythimia     Hypertension Maternal Grandfather     Hypertension Paternal Grandmother     Arthritis Paternal Grandmother         \"autoimmune\"     Heart Attack Paternal Grandfather         76s    No Known Problems Brother      Social History     Socioeconomic History    Marital status:      Spouse name: Not on file    Number of children: 2    Years of education: Not on file    Highest education level: Not on file   Occupational History    Not on file   Tobacco Use    Smoking status: Never Smoker    Smokeless tobacco: Never Used   Vaping Use    Vaping Use: Never used   Substance and Sexual Activity    Alcohol use: Never    Drug use: Never    Sexual activity: Yes     Partners: Male     Birth control/protection: Condom     Comment:    Other Topics Concern    Not on file   Social History Narrative    Not on file     Social Determinants of Health     Financial Resource Strain: Low Risk     Difficulty of Paying Living Expenses: Not very hard   Food Insecurity: No Food Insecurity    Worried About Running Out of Food in the Last Year: Never true    Judson of Food in the Last Year: Never true   Transportation Needs: No Transportation Needs    Lack of Transportation (Medical): No    Lack of Transportation (Non-Medical): No   Physical Activity: Sufficiently Active    Days of Exercise per Week: 3 days    Minutes of Exercise per Session: 60 min   Stress: No Stress Concern Present    Feeling of Stress : Only a little   Social Connections:     Frequency of Communication with Friends and Family:     Frequency of Social Gatherings with Friends and Family:     Attends Yazdanism Services:     Active Member of Clubs or Organizations:     Attends Club or Organization Meetings:     Marital Status:    Intimate Partner Violence:     Fear of Current or Ex-Partner:     Emotionally Abused:     Physically Abused:     Sexually Abused:        Review of Systems:  Review of Systems   Constitutional: Positive for fatigue. Negative for activity change, appetite change, chills, diaphoresis, fever and unexpected weight change. HENT: Negative for ear pain, hearing loss and trouble swallowing.     Eyes: Negative for visual disturbance. Respiratory: Negative for cough and shortness of breath. Cardiovascular: Negative for chest pain, palpitations and leg swelling. Gastrointestinal: Positive for abdominal pain. Negative for blood in stool, constipation, diarrhea, nausea and vomiting. Genitourinary: Negative for decreased urine volume, difficulty urinating, dysuria, flank pain, hematuria and urgency. Musculoskeletal: Negative for arthralgias and back pain. Skin: Negative for color change and rash. Neurological: Negative for dizziness, weakness, light-headedness, numbness and headaches. Psychiatric/Behavioral: Positive for sleep disturbance. Negative for dysphoric mood. The patient is not nervous/anxious. Physical Exam:   Vitals:    06/07/21 1632   BP: (!) 96/54   Pulse: 82   Resp: 14   Temp: 98 °F (36.7 °C)   SpO2: 99%   Weight: 131 lb (59.4 kg)   Height: 5' 6\" (1.676 m)     Body mass index is 21.14 kg/m². Physical Exam  Vitals and nursing note reviewed. Constitutional:       General: She is not in acute distress. Appearance: She is well-developed. She is not diaphoretic. HENT:      Head: Normocephalic and atraumatic. Right Ear: External ear normal.      Left Ear: External ear normal.      Mouth/Throat:      Pharynx: No oropharyngeal exudate. Eyes:      General:         Right eye: No discharge. Left eye: No discharge. Conjunctiva/sclera: Conjunctivae normal.      Pupils: Pupils are equal, round, and reactive to light. Neck:      Thyroid: No thyromegaly. Cardiovascular:      Rate and Rhythm: Normal rate and regular rhythm. Heart sounds: Normal heart sounds. No murmur heard. No friction rub. No gallop. Pulmonary:      Effort: Pulmonary effort is normal. No respiratory distress. Breath sounds: Normal breath sounds. No wheezing or rales. Abdominal:      General: Bowel sounds are normal. There is no distension. Palpations: Abdomen is soft.       Tenderness: There is no abdominal tenderness. There is no guarding or rebound. Musculoskeletal:         General: Normal range of motion. Cervical back: Normal range of motion and neck supple. Lymphadenopathy:      Cervical: No cervical adenopathy. Skin:     General: Skin is warm and dry. Coloration: Skin is not pale. Findings: No erythema or rash. Neurological:      Mental Status: She is alert. Cranial Nerves: No cranial nerve deficit. Coordination: Coordination normal.   Psychiatric:         Behavior: Behavior normal.         Thought Content: Thought content normal.         Judgment: Judgment normal.         Assessment/Plan:  1. Annual physical exam  Has deferred vaccines in the past, deferred again today  - Comprehensive Metabolic Panel; Future    2. Anemia, unspecified type  - CBC Auto Differential; Future  - Iron and TIBC; Future  - Folate; Future  - Vitamin B12; Future  - TSH with Reflex; Future  - Path Review, Smear; Future  - Ferritin; Future    3. Leukopenia, unspecified type  - CBC Auto Differential; Future  - TSH with Reflex; Future  - Path Review, Smear; Future    4. Vitamin D deficiency  - Vitamin D 25 Hydroxy; Future    5. Crohn's disease with complication, unspecified gastrointestinal tract location ([facility]  - CBC Auto Differential; Future  - Iron and TIBC; Future  - Folate; Future  - Vitamin B12; Future  - TSH with Reflex; Future  - Path Review, Smear; Future  - Lipid Panel; Future  - Hemoglobin A1C; Future  - Vitamin D 25 Hydroxy; Future  - C-Reactive Protein; Future  - Magnesium; Future  - ZINC; Future  - COPPER, SERUM; Future  - Ferritin; Future  - Comprehensive Metabolic Panel; Future    6. Takes dietary supplements  - CBC Auto Differential; Future  - Iron and TIBC; Future  - Folate; Future  - Vitamin B12; Future  - TSH with Reflex; Future  - Path Review, Smear; Future  - Lipid Panel; Future  - Hemoglobin A1C; Future  - Vitamin D 25 Hydroxy;  Future  - C-Reactive Protein;

## 2021-06-09 DIAGNOSIS — E55.9 VITAMIN D DEFICIENCY: ICD-10-CM

## 2021-06-09 DIAGNOSIS — K50.919 CROHN'S DISEASE WITH COMPLICATION, UNSPECIFIED GASTROINTESTINAL TRACT LOCATION (HCC): ICD-10-CM

## 2021-06-09 DIAGNOSIS — Z78.9 TAKES DIETARY SUPPLEMENTS: ICD-10-CM

## 2021-06-09 DIAGNOSIS — D64.9 ANEMIA, UNSPECIFIED TYPE: ICD-10-CM

## 2021-06-09 DIAGNOSIS — D72.819 LEUKOPENIA, UNSPECIFIED TYPE: ICD-10-CM

## 2021-06-09 DIAGNOSIS — Z00.00 ANNUAL PHYSICAL EXAM: ICD-10-CM

## 2021-06-09 LAB
A/G RATIO: 1.3 (ref 1.1–2.2)
ALBUMIN SERPL-MCNC: 4.5 G/DL (ref 3.4–5)
ALP BLD-CCNC: 55 U/L (ref 40–129)
ALT SERPL-CCNC: 44 U/L (ref 10–40)
ANION GAP SERPL CALCULATED.3IONS-SCNC: 9 MMOL/L (ref 3–16)
AST SERPL-CCNC: 35 U/L (ref 15–37)
BASOPHILS ABSOLUTE: 0 K/UL (ref 0–0.2)
BASOPHILS RELATIVE PERCENT: 0.5 %
BILIRUB SERPL-MCNC: 0.3 MG/DL (ref 0–1)
BLOOD SMEAR REVIEW: NORMAL
BUN BLDV-MCNC: 8 MG/DL (ref 7–20)
C-REACTIVE PROTEIN: <3 MG/L (ref 0–5.1)
CALCIUM SERPL-MCNC: 9.3 MG/DL (ref 8.3–10.6)
CHLORIDE BLD-SCNC: 103 MMOL/L (ref 99–110)
CHOLESTEROL, TOTAL: 148 MG/DL (ref 0–199)
CO2: 25 MMOL/L (ref 21–32)
CREAT SERPL-MCNC: 0.7 MG/DL (ref 0.6–1.1)
EOSINOPHILS ABSOLUTE: 0 K/UL (ref 0–0.6)
EOSINOPHILS RELATIVE PERCENT: 1.3 %
FERRITIN: 27 NG/ML (ref 15–150)
FOLATE: 19.64 NG/ML (ref 4.78–24.2)
GFR AFRICAN AMERICAN: >60
GFR NON-AFRICAN AMERICAN: >60
GLOBULIN: 3.6 G/DL
GLUCOSE BLD-MCNC: 79 MG/DL (ref 70–99)
HCT VFR BLD CALC: 32.2 % (ref 36–48)
HDLC SERPL-MCNC: 50 MG/DL (ref 40–60)
HEMOGLOBIN: 11.4 G/DL (ref 12–16)
IRON SATURATION: 33 % (ref 15–50)
IRON: 86 UG/DL (ref 37–145)
LDL CHOLESTEROL CALCULATED: 90 MG/DL
LYMPHOCYTES ABSOLUTE: 0.7 K/UL (ref 1–5.1)
LYMPHOCYTES RELATIVE PERCENT: 30.4 %
MAGNESIUM: 2.1 MG/DL (ref 1.8–2.4)
MCH RBC QN AUTO: 32.1 PG (ref 26–34)
MCHC RBC AUTO-ENTMCNC: 35.4 G/DL (ref 31–36)
MCV RBC AUTO: 90.7 FL (ref 80–100)
MONOCYTES ABSOLUTE: 0.3 K/UL (ref 0–1.3)
MONOCYTES RELATIVE PERCENT: 11.9 %
NEUTROPHILS ABSOLUTE: 1.4 K/UL (ref 1.7–7.7)
NEUTROPHILS RELATIVE PERCENT: 55.9 %
PDW BLD-RTO: 12.5 % (ref 12.4–15.4)
PLATELET # BLD: 148 K/UL (ref 135–450)
PMV BLD AUTO: 9.7 FL (ref 5–10.5)
POTASSIUM SERPL-SCNC: 4.6 MMOL/L (ref 3.5–5.1)
RBC # BLD: 3.55 M/UL (ref 4–5.2)
SODIUM BLD-SCNC: 137 MMOL/L (ref 136–145)
TOTAL IRON BINDING CAPACITY: 260 UG/DL (ref 260–445)
TOTAL PROTEIN: 8.1 G/DL (ref 6.4–8.2)
TRIGL SERPL-MCNC: 42 MG/DL (ref 0–150)
TSH REFLEX: 2.13 UIU/ML (ref 0.27–4.2)
VITAMIN B-12: 689 PG/ML (ref 211–911)
VITAMIN D 25-HYDROXY: 37.6 NG/ML
VLDLC SERPL CALC-MCNC: 8 MG/DL
WBC # BLD: 2.5 K/UL (ref 4–11)

## 2021-06-10 LAB
ESTIMATED AVERAGE GLUCOSE: 93.9 MG/DL
HBA1C MFR BLD: 4.9 %
HEMATOLOGY PATH CONSULT: NORMAL

## 2021-06-12 LAB
COPPER: 97.4 UG/DL (ref 80–155)
ZINC: 68.2 UG/DL (ref 60–120)

## 2021-06-14 DIAGNOSIS — D70.9 NEUTROPENIA, UNSPECIFIED TYPE (HCC): Primary | ICD-10-CM

## 2021-06-14 DIAGNOSIS — D64.9 ANEMIA, UNSPECIFIED TYPE: ICD-10-CM

## 2021-06-24 ENCOUNTER — TELEPHONE (OUTPATIENT)
Dept: FAMILY MEDICINE CLINIC | Age: 31
End: 2021-06-24

## 2021-06-30 ASSESSMENT — ENCOUNTER SYMPTOMS: ABDOMINAL PAIN: 1

## 2021-07-07 ENCOUNTER — PATIENT MESSAGE (OUTPATIENT)
Dept: FAMILY MEDICINE CLINIC | Age: 31
End: 2021-07-07

## 2021-07-07 DIAGNOSIS — Z01.89 PATIENT REQUESTED DIAGNOSTIC TESTING: Primary | ICD-10-CM

## 2021-07-07 NOTE — TELEPHONE ENCOUNTER
From: Giselle Cast  To: Dimitry Jaquez MD  Sent: 7/7/2021 3:26 PM EDT  Subject: Non-Urgent Medical Question    Hi. I wanted to see if you could request any of the following blood tests for my insurance? And if there are some I have already taken just disregard. I am seeing a functional medicine doctor and I am having to pay a lot of out of pocket costs due to it not being covered by insurance. If I can get anything covered that would be extremely helpful! Thank you!

## 2021-09-13 ENCOUNTER — OFFICE VISIT (OUTPATIENT)
Dept: OBGYN CLINIC | Age: 31
End: 2021-09-13
Payer: OTHER GOVERNMENT

## 2021-09-13 VITALS
WEIGHT: 126 LBS | DIASTOLIC BLOOD PRESSURE: 68 MMHG | BODY MASS INDEX: 20.34 KG/M2 | SYSTOLIC BLOOD PRESSURE: 118 MMHG | HEART RATE: 80 BPM | TEMPERATURE: 97 F

## 2021-09-13 DIAGNOSIS — N91.2 AMENORRHEA: Primary | ICD-10-CM

## 2021-09-13 DIAGNOSIS — K50.919 CROHN'S DISEASE WITH COMPLICATION, UNSPECIFIED GASTROINTESTINAL TRACT LOCATION (HCC): ICD-10-CM

## 2021-09-13 DIAGNOSIS — M35.00 SJOGREN'S SYNDROME, WITH UNSPECIFIED ORGAN INVOLVEMENT (HCC): ICD-10-CM

## 2021-09-13 DIAGNOSIS — Z32.01 POSITIVE URINE PREGNANCY TEST: ICD-10-CM

## 2021-09-13 DIAGNOSIS — Z86.32 HISTORY OF GESTATIONAL DIABETES: ICD-10-CM

## 2021-09-13 DIAGNOSIS — N93.0 POSTCOITAL AND CONTACT BLEEDING: ICD-10-CM

## 2021-09-13 DIAGNOSIS — Z98.890 S/P CONIZATION OF CERVIX: ICD-10-CM

## 2021-09-13 PROBLEM — O24.419 GESTATIONAL DIABETES: Status: ACTIVE | Noted: 2018-01-01

## 2021-09-13 LAB
BILIRUBIN URINE: NEGATIVE
BLOOD, URINE: NEGATIVE
CLARITY: CLEAR
COLOR: YELLOW
EPITHELIAL CELLS, UA: 1 /HPF (ref 0–5)
GLUCOSE URINE: NEGATIVE MG/DL
HYALINE CASTS: 0 /LPF (ref 0–8)
KETONES, URINE: NEGATIVE MG/DL
LEUKOCYTE ESTERASE, URINE: ABNORMAL
MICROSCOPIC EXAMINATION: YES
NITRITE, URINE: NEGATIVE
PH UA: 7 (ref 5–8)
PROTEIN UA: NEGATIVE MG/DL
RBC UA: 2 /HPF (ref 0–4)
SPECIFIC GRAVITY UA: 1.01 (ref 1–1.03)
URINE TYPE: ABNORMAL
UROBILINOGEN, URINE: 0.2 E.U./DL
WBC UA: 1 /HPF (ref 0–5)

## 2021-09-13 PROCEDURE — 76801 OB US < 14 WKS SINGLE FETUS: CPT | Performed by: OBSTETRICS & GYNECOLOGY

## 2021-09-13 PROCEDURE — 99213 OFFICE O/P EST LOW 20 MIN: CPT | Performed by: OBSTETRICS & GYNECOLOGY

## 2021-09-13 PROCEDURE — 81025 URINE PREGNANCY TEST: CPT | Performed by: OBSTETRICS & GYNECOLOGY

## 2021-09-13 ASSESSMENT — ENCOUNTER SYMPTOMS
ABDOMINAL DISTENTION: 0
NAUSEA: 1
ABDOMINAL PAIN: 0
SHORTNESS OF BREATH: 0
CONSTIPATION: 0
DIARRHEA: 0
VOMITING: 1

## 2021-09-14 LAB
CANDIDA SPECIES, DNA PROBE: NORMAL
CRL: NORMAL
GARDNERELLA VAGINALIS, DNA PROBE: NORMAL
SAC DIAMETER: NORMAL
TRICHOMONAS VAGINALIS DNA: NORMAL

## 2021-09-14 NOTE — PROGRESS NOTES
Subjective:      Patient ID: Dennie Alley is a 28 y.o. female. HPI  29 y/o  female presents for evaluation secondary to amenorrhea/missed menses. Menarche occurred age 16. Menses occur every month x 5-7 days, heavy 1st couple days (tampon and pad). FDLMP: 7/15/2021. Denies vaginal bleeding and discharge since July. Admits to nausea and vomiting and postcoital bleeding. Treating with homeopathic antiemetics. Continues to adjust to pregnancy. History is positive for gestational diabetes in 2nd pregnancy. Crohns (cellular level, lacks amino acids)  and Sjogrens disease--stable since last visit. Since last visit patient has been seeing functional medical doctor and has discontinued all medications. 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 Pap smear on 20 indicated ASCUS findings with positive HPV. 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 History is positive for left ovarian cyst.  Denies known history of pelvic infection, uterine fibroid and endometriosis. Review of Systems   Constitutional: Negative for activity change, appetite change, chills, fatigue, fever and unexpected weight change. Respiratory: Negative for shortness of breath. Cardiovascular: Negative for chest pain. Gastrointestinal: Positive for nausea and vomiting. Negative for abdominal distention, abdominal pain, constipation and diarrhea. Endocrine: Negative for cold intolerance and heat intolerance. Genitourinary: Positive for menstrual problem. Negative for difficulty urinating, dyspareunia, dysuria, frequency, genital sores, hematuria, pelvic pain, vaginal bleeding, vaginal discharge and vaginal pain. Skin: Negative for rash. Neurological: Negative for headaches. Hematological: Does not bruise/bleed easily.        Objective: is normal.  The uterus is gravid. The uterus measures 8.53 cm x 7.28 cm x 6.90 cm. No uterine anomalies are evident. The right ovary is present. The right ovary measures 3.46 cm x 2.30 cm x 1.84 cm. The left ovary is present. The left ovary measures 2.24 cm x 1.66 cm x 1.47 cm. There is a single intrauterine pregnancy identified. A fetal pole is noted with a CRL measuring 1.83 cm, consistent with gestational age of 8weeks and 2days and EDC of 04/23/2022. There is a 2 day discrepancy when compared with the gestational age of 8weeks and 4days and EDC of 04/21/2022 set by [facility]DP (07/15/2021). Yolk sac is present and measures 0.39 cm. Fetal cardiac activity is present at 178 bpm.     IMPRESSION:    Single IUP with cardiac activity. Imaging is limited secondary to uterine position. Patient is well aware of the limitations of ultrasound in the detection of anomalies. Assessment:       Diagnosis Orders   1. Amenorrhea  VAGINAL PATHOGENS PROBE *A    C.trachomatis N.gonorrhoeae DNA    URINALYSIS WITH MICROSCOPIC    Culture, Urine   2. Positive urine pregnancy test  POCT urine pregnancy   3. S/P conization of cervix     4. Crohn's disease with complication, unspecified gastrointestinal tract location ([address]     5. Sjogren's syndrome, with unspecified organ involvement ([address]     6. History of gestational diabetes     7. Postcoital and contact bleeding             Plan:      Orders Placed This Encounter   Procedures    C.trachomatis N.gonorrhoeae DNA    Culture, Urine    VAGINAL PATHOGENS PROBE *A    URINALYSIS WITH MICROSCOPIC    POCT urine pregnancy     Ultrasound result reviewed--reassurance  Continue prenatal vitamin with folic acid  Options for prenatal testing reviewed: NT, quad screen, free fetal DNA. Risks and benefits discussed. Patient declines testing. Schedule cervical length at 16 weeks gestation  Early diabetes screening--1 hour GTT and hemoglobin A1C at next visit.    Follow up prn and 4 weeks for initial prenatal visit and prenatal lab work.             Edwardo Pacheco DO

## 2021-09-15 LAB
C TRACH DNA GENITAL QL NAA+PROBE: NEGATIVE
N. GONORRHOEAE DNA: NEGATIVE
URINE CULTURE, ROUTINE: NORMAL

## 2021-10-11 ENCOUNTER — INITIAL PRENATAL (OUTPATIENT)
Dept: OBGYN CLINIC | Age: 31
End: 2021-10-11
Payer: OTHER GOVERNMENT

## 2021-10-11 ENCOUNTER — OFFICE VISIT (OUTPATIENT)
Dept: OBGYN CLINIC | Age: 31
End: 2021-10-11
Payer: OTHER GOVERNMENT

## 2021-10-11 DIAGNOSIS — Z34.82 PRENATAL CARE, SUBSEQUENT PREGNANCY IN SECOND TRIMESTER: Primary | ICD-10-CM

## 2021-10-11 DIAGNOSIS — Z98.890 S/P CONIZATION OF CERVIX: Primary | ICD-10-CM

## 2021-10-11 DIAGNOSIS — Z86.32 HISTORY OF GESTATIONAL DIABETES MELLITUS (GDM): ICD-10-CM

## 2021-10-11 DIAGNOSIS — M35.00 SJOGREN'S SYNDROME, WITH UNSPECIFIED ORGAN INVOLVEMENT (HCC): ICD-10-CM

## 2021-10-11 DIAGNOSIS — K50.919 CROHN'S DISEASE WITH COMPLICATION, UNSPECIFIED GASTROINTESTINAL TRACT LOCATION (HCC): ICD-10-CM

## 2021-10-11 DIAGNOSIS — Z98.890 S/P CONIZATION OF CERVIX: ICD-10-CM

## 2021-10-11 LAB
ABDOMINAL CIRCUMFERENCE: NORMAL
BASOPHILS ABSOLUTE: 0 K/UL (ref 0–0.2)
BASOPHILS RELATIVE PERCENT: 0.3 %
BIPARIETAL DIAMETER: NORMAL
EOSINOPHILS ABSOLUTE: 0 K/UL (ref 0–0.6)
EOSINOPHILS RELATIVE PERCENT: 0.3 %
ESTIMATED FETAL WEIGHT: NORMAL
FEMORAL DIAMETER: NORMAL
GLUCOSE CHALLENGE: 164 MG/DL
HC/AC: NORMAL
HCT VFR BLD CALC: 29.2 % (ref 36–48)
HEAD CIRCUMFERENCE: NORMAL
HEMOGLOBIN: 10 G/DL (ref 12–16)
LYMPHOCYTES ABSOLUTE: 0.6 K/UL (ref 1–5.1)
LYMPHOCYTES RELATIVE PERCENT: 15 %
MCH RBC QN AUTO: 31.8 PG (ref 26–34)
MCHC RBC AUTO-ENTMCNC: 34.3 G/DL (ref 31–36)
MCV RBC AUTO: 92.6 FL (ref 80–100)
MONOCYTES ABSOLUTE: 0.2 K/UL (ref 0–1.3)
MONOCYTES RELATIVE PERCENT: 5.9 %
NEUTROPHILS ABSOLUTE: 3 K/UL (ref 1.7–7.7)
NEUTROPHILS RELATIVE PERCENT: 78.5 %
PDW BLD-RTO: 13.3 % (ref 12.4–15.4)
PLATELET # BLD: 136 K/UL (ref 135–450)
PMV BLD AUTO: 9.4 FL (ref 5–10.5)
RBC # BLD: 3.15 M/UL (ref 4–5.2)
WBC # BLD: 3.9 K/UL (ref 4–11)

## 2021-10-11 PROCEDURE — 36415 COLL VENOUS BLD VENIPUNCTURE: CPT | Performed by: OBSTETRICS & GYNECOLOGY

## 2021-10-11 PROCEDURE — 76816 OB US FOLLOW-UP PER FETUS: CPT | Performed by: OBSTETRICS & GYNECOLOGY

## 2021-10-11 PROCEDURE — 0500F INITIAL PRENATAL CARE VISIT: CPT | Performed by: OBSTETRICS & GYNECOLOGY

## 2021-10-12 RX ORDER — FERROUS SULFATE 325(65) MG
325 TABLET ORAL
Qty: 90 TABLET | Refills: 1 | Status: ON HOLD | OUTPATIENT
Start: 2021-10-12 | End: 2022-04-21 | Stop reason: HOSPADM

## 2021-10-12 NOTE — PROGRESS NOTES
Initial Prenatal Visit      CC:   Chief Complaint   Patient presents with    Initial Prenatal Visit          HPI: Amish Barnes is a 28 y.o. at 12w4d who presents for initial prenatal visit. Patient is doing well today without complaints. Patient is overall doing well today. Reports some constipation. Has noticed some improvement as she has been able to change her diet to control Chron's now that the nausea/vomiting is improving. Has not begun to feel fetal movement. Denies pelvic pressure, cramping or contractions. Denies vaginal bleeding, loss of fluid. Denies headache, vision changes, RUQ pain, increased LE edema. Denies chest pain, shortness of breath, fever, chills, nausea, vomiting. Review of Systems: The following ROS was otherwise negative, except as noted in the HPI: constitutional, HEENT, respiratory, cardiovascular, gastrointestinal, genitourinary, skin, musculoskeletal, neurological, psych    Gynecologic History: Hx of CIN2, Colposcopy and cervical cone 2020    Obstetrical History:  OB History        3    Para   2    Term   2            AB        Living   2       SAB        TAB        Ectopic        Molar        Multiple        Live Births   2                Past Medical History:   Past Medical History:   Diagnosis Date    Abnormal Pap smear of cervix     Auto[facility] disorder [facility]     Crohn's disease ([facility]     Gestational diabetes 2018    Sjogren's syndrome ([facility]        Medications:  Prior to Admission medications    Medication Sig Start Date End Date Taking?  Authorizing Provider   ferrous sulfate (IRON 325) 325 (65 Fe) MG tablet Take 1 tablet by mouth daily (with breakfast) 10/12/21  Yes Boby Hobson, DO   ondansetron (ZOFRAN-ODT) 4 MG disintegrating tablet Take 1 tablet by mouth every 8 hours as needed for Nausea 1/15/21  Yes Donn Pacheco, DO   Omega-3 Fatty Acids (FISH OIL PO) Take by mouth daily   Yes Historical Provider, MD   VITAMIN D PO Take by mouth daily   Yes Historical Provider, MD   TRUEplus Lancets 33G MISC USE FOUR TIMES DAILY AS DIRECTED 10/16/21   Truptiavinash Chavez DO   blood glucose test strips (FREESTYLE LITE) strip TEST FIVE TIMES A DAY FASTING, POSTPRANDIAL AND AT BEDTIME PLUS ADDITIONAL TO ACCOMMODATE AS NEEDED TESTING 10/16/21   Sherman Saini DO   Blood Glucose Monitoring Suppl (FREESTYLE FREEDOM LITE) w/Device KIT 1 kit by Does not apply route 5 times daily Testing 5 times daily, fasting, postprandial and hs. Lancets and strips needed.  10/14/21   Shay Pacheco DO        Allergies:  Amoxicillin and Pcn [penicillins]    Surgical History:  Past Surgical History:   Procedure Laterality Date    COLONOSCOPY      LEEP N/A 1/15/2021    COLPOSCOPY, CERVICAL CONIZATION, ENDOCERVICAL CURRETTINGS performed by Yue Adamson DO at [facility] 134 EXTRACTION  2008       Family History:  Family History   Problem Relation Age of Onset    Diabetes Mother     Hypertension Father     No Known Problems Sister     No Known Problems Brother     Arthritis Maternal Grandmother         osteoarthritis     Heart Disease Maternal Grandfather         arrhythimia     Hypertension Maternal Grandfather     Hypertension Paternal Grandmother     Arthritis Paternal Grandmother         \"autoimmune\"     Heart Attack Paternal Grandfather         76s    No Known Problems Brother        Social History:  Social History     Socioeconomic History    Marital status:      Spouse name: None    Number of children: 2    Years of education: None    Highest education level: None   Occupational History    None   Tobacco Use    Smoking status: Never Smoker    Smokeless tobacco: Never Used   Vaping Use    Vaping Use: Never used   Substance and Sexual Activity    Alcohol use: Never    Drug use: Never    Sexual activity: Yes     Partners: Male     Birth control/protection: Condom     Comment:    Other Topics Concern    None   Social History Narrative    None     Social Determinants of Health     Financial Resource Strain: Low Risk     Difficulty of Paying Living Expenses: Not very hard   Food Insecurity: No Food Insecurity    Worried About Running Out of Food in the Last Year: Never true    Judson of Food in the Last Year: Never true   Transportation Needs: No Transportation Needs    Lack of Transportation (Medical): No    Lack of Transportation (Non-Medical): No   Physical Activity: Sufficiently Active    Days of Exercise per Week: 3 days    Minutes of Exercise per Session: 60 min   Stress: No Stress Concern Present    Feeling of Stress : Only a little   Social Connections:     Frequency of Communication with Friends and Family:     Frequency of Social Gatherings with Friends and Family:     Attends Hoahaoism Services:     Active Member of Clubs or Organizations:     Attends Club or Organization Meetings:     Marital Status:    Intimate Partner Violence:     Fear of Current or Ex-Partner:     Emotionally Abused:     Physically Abused:     Sexually Abused:        Physical Exam:  /64 (Position: Sitting)   Pulse 80   Temp 98 °F (36.7 °C)   Wt 129 lb 3.2 oz (58.6 kg)   LMP 07/15/2021   BMI 20.85 kg/m²     Physical Exam  Vitals reviewed. Constitutional:       General: She is not in acute distress. Appearance: She is well-developed. HENT:      Head: Normocephalic and atraumatic. Eyes:      Conjunctiva/sclera: Conjunctivae normal.   Cardiovascular:      Rate and Rhythm: Normal rate. Pulmonary:      Effort: Pulmonary effort is normal. No respiratory distress. Abdominal:      General: There is no distension. Palpations: Abdomen is soft. Tenderness: There is no abdominal tenderness. There is no guarding or rebound. Musculoskeletal:         General: No swelling. Skin:     General: Skin is warm and dry.    Neurological:      Mental Status: She is alert and oriented to Glucose Challenge Gestational    3. S/P conization of cervix     - CONE 11/2020     - Will plan for CL screening at 16 weeks     4. Sjogren's syndrome, with unspecified organ involvement ([facility]     - Stable    5. Crohn's disease with complication, unspecified gastrointestinal tract location ([facility]     - Stable     - Reports some increased constipation, however now that nausea and vomiting are improving has been able to regulate diet and noticing some improvement.       - Reviewed colace for constipation management and adequate hydration       Radha Bass DO

## 2021-10-14 ENCOUNTER — TELEPHONE (OUTPATIENT)
Dept: OBGYN CLINIC | Age: 31
End: 2021-10-14

## 2021-10-14 DIAGNOSIS — O99.810 ABNORMAL GLUCOSE TOLERANCE TEST IN PREGNANCY: Primary | ICD-10-CM

## 2021-10-14 RX ORDER — GLUCOSAMINE HCL/CHONDROITIN SU 500-400 MG
CAPSULE ORAL
Qty: 200 STRIP | Refills: 0 | Status: SHIPPED | OUTPATIENT
Start: 2021-10-14 | End: 2021-10-15

## 2021-10-14 RX ORDER — LANCETS 30 GAUGE
1 EACH MISCELLANEOUS 4 TIMES DAILY
Qty: 200 EACH | Refills: 0 | Status: SHIPPED | OUTPATIENT
Start: 2021-10-14 | End: 2021-10-15

## 2021-10-14 RX ORDER — BLOOD-GLUCOSE METER
1 KIT MISCELLANEOUS
Qty: 1 KIT | Refills: 0 | Status: SHIPPED | OUTPATIENT
Start: 2021-10-14

## 2021-10-14 NOTE — TELEPHONE ENCOUNTER
Spoke with pt. Advised ok for testing 5 x day per result note. Pt has [facility] care insurance. They require pt to download form- voucher for free Free style light meter @ [employer]/get-a-free-meter. They use the company [employer] for Diabetic testing meters. Pended supplies, please sign. Pt aware of how to obtain meter and number provided [phone] . Pt to call office if any issues with getting meter.

## 2021-10-16 RX ORDER — GLUCOSAM/CHON-MSM1/C/MANG/BOSW 500-416.6
TABLET ORAL
Qty: 300 EACH | Refills: 2 | Status: SHIPPED | OUTPATIENT
Start: 2021-10-16

## 2021-10-16 RX ORDER — BLOOD-GLUCOSE METER
KIT MISCELLANEOUS
Qty: 400 STRIP | Refills: 2 | Status: ON HOLD | OUTPATIENT
Start: 2021-10-16 | End: 2022-04-21 | Stop reason: HOSPADM

## 2021-10-17 VITALS
TEMPERATURE: 98 F | HEART RATE: 80 BPM | BODY MASS INDEX: 20.85 KG/M2 | WEIGHT: 129.2 LBS | DIASTOLIC BLOOD PRESSURE: 64 MMHG | SYSTOLIC BLOOD PRESSURE: 122 MMHG

## 2021-11-09 ENCOUNTER — OFFICE VISIT (OUTPATIENT)
Dept: OBGYN CLINIC | Age: 31
End: 2021-11-09
Payer: OTHER GOVERNMENT

## 2021-11-09 ENCOUNTER — ROUTINE PRENATAL (OUTPATIENT)
Dept: OBGYN CLINIC | Age: 31
End: 2021-11-09

## 2021-11-09 VITALS
BODY MASS INDEX: 21.14 KG/M2 | DIASTOLIC BLOOD PRESSURE: 62 MMHG | HEART RATE: 90 BPM | SYSTOLIC BLOOD PRESSURE: 110 MMHG | WEIGHT: 131 LBS

## 2021-11-09 DIAGNOSIS — Z98.890 S/P CONIZATION OF CERVIX: ICD-10-CM

## 2021-11-09 DIAGNOSIS — M35.00 SJOGREN'S SYNDROME, WITH UNSPECIFIED ORGAN INVOLVEMENT (HCC): ICD-10-CM

## 2021-11-09 DIAGNOSIS — K50.919 CROHN'S DISEASE WITH COMPLICATION, UNSPECIFIED GASTROINTESTINAL TRACT LOCATION (HCC): ICD-10-CM

## 2021-11-09 DIAGNOSIS — Z34.82 PRENATAL CARE, SUBSEQUENT PREGNANCY IN SECOND TRIMESTER: Primary | ICD-10-CM

## 2021-11-09 DIAGNOSIS — Z86.32 HISTORY OF GESTATIONAL DIABETES: ICD-10-CM

## 2021-11-09 DIAGNOSIS — Z98.890 S/P CONIZATION OF CERVIX: Primary | ICD-10-CM

## 2021-11-09 LAB
ABDOMINAL CIRCUMFERENCE: NORMAL
BIPARIETAL DIAMETER: NORMAL
ESTIMATED FETAL WEIGHT: NORMAL
FEMORAL DIAMETER: NORMAL
HC/AC: NORMAL
HEAD CIRCUMFERENCE: NORMAL

## 2021-11-09 PROCEDURE — 0502F SUBSEQUENT PRENATAL CARE: CPT | Performed by: OBSTETRICS & GYNECOLOGY

## 2021-11-09 PROCEDURE — 76817 TRANSVAGINAL US OBSTETRIC: CPT | Performed by: OBSTETRICS & GYNECOLOGY

## 2021-11-12 NOTE — PROGRESS NOTES
27 y/o  female at 16 weeks 5 days gestation with [facility] 2021 presents for prenatal visit and cervical length scan. History is positive for gestational diabetes in 2nd pregnancy. Crohns (cellular level, lacks amino acids)  and Sjogrens disease--stable since last visit. Has been seeing functional medical doctor and has discontinued all medications. Patient is 10 months s/p colposcopy, cervical conization secondary to HGSIL (CIN2)  on colposcopy, ECC, and cervical biopsies. Glucose monitoring:  fastings 60's-70's  1 hour postprandials: 90's-120's  Glucose levels have been normal for the past 2 weeks. Denies vaginal bleeding, loss of fluid, contractions. Admits to fetal movement. Has noted some contraction-like pain in the past 2 weeks when not drinking water--transient  Admits to diarrhea and constipation--working with dietician to adjust diet. Denies fever, chills, chest pain, shortness of breath, nausea, vomiting, dysuria, and hematuria. Denies headache, vision changes, and RUQ pain. Maternal Wellness Questionnaire reviewed--no concerns. OB ULTRASOUND CERVICAL LENGTH    DATE: 2021    PHYSICIAN: TOMEKA Pacheco D.O.    SONOGRAPHER: Apoorva Bains [facility]    INDICATION: Cervical length    TYPE OF SCAN: vaginal, abdominal    FINDINGS:  A single viable intrauterine pregnancy is noted with a heart rate of 142 bpm. Cardiac and somatic activity are noted. Transvaginal cervical length is 4.22 cm with no funneling noted. Fusion of the amnion is not appreciated. IMPRESSION:   Single live IUP in the second trimester. Cervical length is 4.22 cm. Imaging is limited secondary to fetal position. The patient is well aware of the limitations of ultrasound in the detection of anomalies. Diagnosis Orders   1. Prenatal care, subsequent pregnancy in second trimester     2. Sjogren's syndrome, with unspecified organ involvement ([address]     3. S/P conization of cervix     4.  History of gestational diabetes     5. Crohn's disease with complication, unspecified gastrointestinal tract location ([facility]       Decrease glucose checks--spot checks and random levels until 24 weeks. Start rechecking glucose levels at 24 weeks in lieu of early 1 hour, then 1 hour at 28 weeks. Follow up prn and 2-4 weeks for prenatal visit and ultrasound--anatomy, cervical length.

## 2021-11-13 PROBLEM — Z34.82 PRENATAL CARE, SUBSEQUENT PREGNANCY IN SECOND TRIMESTER: Status: ACTIVE | Noted: 2021-11-13

## 2021-11-13 PROBLEM — N92.0 MENORRHAGIA WITH REGULAR CYCLE: Status: RESOLVED | Noted: 2020-12-30 | Resolved: 2021-11-13

## 2021-12-08 ENCOUNTER — OFFICE VISIT (OUTPATIENT)
Dept: OBGYN CLINIC | Age: 31
End: 2021-12-08
Payer: OTHER GOVERNMENT

## 2021-12-08 ENCOUNTER — ROUTINE PRENATAL (OUTPATIENT)
Dept: OBGYN CLINIC | Age: 31
End: 2021-12-08

## 2021-12-08 VITALS
SYSTOLIC BLOOD PRESSURE: 100 MMHG | HEART RATE: 88 BPM | DIASTOLIC BLOOD PRESSURE: 60 MMHG | BODY MASS INDEX: 22.11 KG/M2 | WEIGHT: 137 LBS

## 2021-12-08 DIAGNOSIS — Z86.32 HISTORY OF GESTATIONAL DIABETES: ICD-10-CM

## 2021-12-08 DIAGNOSIS — M35.00 SJOGREN'S SYNDROME, WITH UNSPECIFIED ORGAN INVOLVEMENT (HCC): ICD-10-CM

## 2021-12-08 DIAGNOSIS — K50.919 CROHN'S DISEASE WITH COMPLICATION, UNSPECIFIED GASTROINTESTINAL TRACT LOCATION (HCC): ICD-10-CM

## 2021-12-08 DIAGNOSIS — Z34.82 PRENATAL CARE, SUBSEQUENT PREGNANCY IN SECOND TRIMESTER: Primary | ICD-10-CM

## 2021-12-08 DIAGNOSIS — Z98.890 S/P CONIZATION OF CERVIX: ICD-10-CM

## 2021-12-08 PROCEDURE — 76805 OB US >/= 14 WKS SNGL FETUS: CPT | Performed by: OBSTETRICS & GYNECOLOGY

## 2021-12-08 PROCEDURE — 0502F SUBSEQUENT PRENATAL CARE: CPT | Performed by: OBSTETRICS & GYNECOLOGY

## 2021-12-08 NOTE — PROGRESS NOTES
Temp-97.8 infrared    Maternal emotional well being screening form completed and reviewed with patient. Current score is 2. Patient given referral to [address] [place] [address] ([phone]):  No

## 2021-12-11 NOTE — PROGRESS NOTES
27 y/o Codi Pisano female at 20 weeks 6 days gestation with [facility] 4/21/2021 presents for prenatal visit and ultrasound. History is positive for gestational diabetes in 2nd pregnancy. Crohns (cellular level, lacks amino acids)  and Sjogrens disease--stable since last visit. Has been seeing functional medical doctor and has discontinued all medications. Patient is 11 months s/p colposcopy, cervical conization secondary to HGSIL (CIN2)  on colposcopy, ECC, and cervical biopsies. Continues to perform glucose monitoring:  Glucose levels have been normal for the past 4 weeks. Denies vaginal bleeding, loss of fluid, contractions. Admits to baseline Khadra Damián. Admits to fetal movement. Has noted some contraction-like pain in the past 2 weeks when not drinking water--transient  Admits to persistent constipation. Denies fever, chills, chest pain, shortness of breath, nausea, vomiting, dysuria, and hematuria. Denies headache, vision changes, and RUQ pain. Maternal Wellness Questionnaire reviewed--no concerns. OBSTETRIC ULTRASOUND -- SECOND TRIMESTER    DATE:  12/08/2021    PHYSICIAN: TOMEKA Pacheco D.O.    SONOGRAPHER: Macey Augustine RDMS    INDICATION:  Second trimester, Anatomy screening    TYPE OF SCAN: abdominal, vaginal 3.5 MHz 5MHz    FINDINGS:      A single viable intrauterine pregnancy is noted in cephalic presentation. Cardiac and somatic activity are noted. The following values were obtained:   Fetal heart rate    139 bpm   BPD      4.86cm  42.3 %   Head Circumference    18.52cm  41.1 %    Abdominal Circumference   15.57cm  39.2 %   Femur Length     3.64cm  65.9 %   Humerus Length    3.43cm  76.1 %   Cerebellum     2.24cm  67.6 %   Amniotic fluid DVP    7.76cm   EFW      397g  55.9 percentile    Subjective amniotic fluid volume is normal. Based on sonographic criteria, the estimated fetal age is 21weeks and 1days with EDC of 04/19/2022.   There is a 12 day discordance with the established [facility] of 04/21/2022. The patient has an anterior placenta that is adequate distance in relation to the internal cervical os. The evaluation of the lower uterine segment and cervix reveals normal appearing anatomy. Transvaginal cervical length is 4.72cm with no funneling noted. The uterus is unremarkable/gravid. Maternal ovaries and adnexae are not well visualized due to the size of the uterus and patient's gravid state. Normal Anatomy Seen:  4 chamber heart   Spine    CSP  LVOT     Kidneys   Lateral ventricles  RVOT     Umbilical arteries  Cerebellum  Ductal arch    Bladder   Cisterna magna  Aortic arch    Face    Nuchal fold  Diaphragm    Profile    Upper extremities  Stomach    Nose/lips   Lower extremities  ACI     PCI    Choroid plexus    Suboptimal anatomy seen:    Abnormal anatomy seen: The fetal genitalia is noted to be Female. IMPRESSION:  Single living IUP. No gross structural abnormalities are visualized. Amniotic fluid volume is subjectively normal.    The patient is well aware of the limitations of ultrasound in the detection of fetal anomalies. The scan is limited by fetal position. Diagnosis Orders   1. Prenatal care, subsequent pregnancy in second trimester     2. History of gestational diabetes     3. S/P conization of cervix     4. Crohn's disease with complication, unspecified gastrointestinal tract location ([address]     5. Sjogren's syndrome, with unspecified organ involvement ([address]       Continue random and prn glucose levels until 24 weeks. Start rechecking glucose levels at 24 weeks in lieu of early 1 hour, then 1 hour at 28 weeks. Growth scan 30-32 weeks  Follow up prn and 4 weeks for prenatal visit.

## 2022-01-04 ENCOUNTER — ROUTINE PRENATAL (OUTPATIENT)
Dept: OBGYN CLINIC | Age: 32
End: 2022-01-04
Payer: OTHER GOVERNMENT

## 2022-01-04 VITALS
DIASTOLIC BLOOD PRESSURE: 60 MMHG | HEART RATE: 86 BPM | BODY MASS INDEX: 23.11 KG/M2 | WEIGHT: 143.2 LBS | SYSTOLIC BLOOD PRESSURE: 120 MMHG

## 2022-01-04 DIAGNOSIS — Z34.82 PRENATAL CARE, SUBSEQUENT PREGNANCY IN SECOND TRIMESTER: Primary | ICD-10-CM

## 2022-01-04 DIAGNOSIS — I49.9 IRREGULAR HEART RATE: ICD-10-CM

## 2022-01-04 DIAGNOSIS — M35.00 SJOGREN'S SYNDROME, WITH UNSPECIFIED ORGAN INVOLVEMENT (HCC): ICD-10-CM

## 2022-01-04 DIAGNOSIS — Z98.890 S/P CONIZATION OF CERVIX: ICD-10-CM

## 2022-01-04 DIAGNOSIS — K50.919 CROHN'S DISEASE WITH COMPLICATION, UNSPECIFIED GASTROINTESTINAL TRACT LOCATION (HCC): ICD-10-CM

## 2022-01-04 DIAGNOSIS — Z86.32 HISTORY OF GESTATIONAL DIABETES: ICD-10-CM

## 2022-01-04 PROCEDURE — 0502F SUBSEQUENT PRENATAL CARE: CPT | Performed by: OBSTETRICS & GYNECOLOGY

## 2022-01-04 PROCEDURE — 36415 COLL VENOUS BLD VENIPUNCTURE: CPT | Performed by: OBSTETRICS & GYNECOLOGY

## 2022-01-04 NOTE — PROGRESS NOTES
Temp-97.6 infrared    Maternal emotional well being screening form completed and reviewed with patient. Current score is 5. Patient given referral to [address] [place] [address] ([phone]):  No

## 2022-01-05 LAB
A/G RATIO: 0.8 (ref 1.1–2.2)
ALBUMIN SERPL-MCNC: 3.4 G/DL (ref 3.4–5)
ALP BLD-CCNC: 47 U/L (ref 40–129)
ALT SERPL-CCNC: 9 U/L (ref 10–40)
ANION GAP SERPL CALCULATED.3IONS-SCNC: 12 MMOL/L (ref 3–16)
AST SERPL-CCNC: 18 U/L (ref 15–37)
BASOPHILS ABSOLUTE: 0 K/UL (ref 0–0.2)
BASOPHILS RELATIVE PERCENT: 0.4 %
BILIRUB SERPL-MCNC: <0.2 MG/DL (ref 0–1)
BUN BLDV-MCNC: 8 MG/DL (ref 7–20)
CALCIUM SERPL-MCNC: 8.9 MG/DL (ref 8.3–10.6)
CHLORIDE BLD-SCNC: 104 MMOL/L (ref 99–110)
CO2: 20 MMOL/L (ref 21–32)
CREAT SERPL-MCNC: <0.5 MG/DL (ref 0.6–1.1)
EOSINOPHILS ABSOLUTE: 0 K/UL (ref 0–0.6)
EOSINOPHILS RELATIVE PERCENT: 0.3 %
ESTIMATED AVERAGE GLUCOSE: 91.1 MG/DL
GFR AFRICAN AMERICAN: >60
GFR NON-AFRICAN AMERICAN: >60
GLUCOSE BLD-MCNC: 79 MG/DL (ref 70–99)
HBA1C MFR BLD: 4.8 %
HCT VFR BLD CALC: 28.8 % (ref 36–48)
HEMOGLOBIN: 9.1 G/DL (ref 12–16)
LYMPHOCYTES ABSOLUTE: 0.6 K/UL (ref 1–5.1)
LYMPHOCYTES RELATIVE PERCENT: 9.4 %
MCH RBC QN AUTO: 29.9 PG (ref 26–34)
MCHC RBC AUTO-ENTMCNC: 31.7 G/DL (ref 31–36)
MCV RBC AUTO: 94.2 FL (ref 80–100)
MONOCYTES ABSOLUTE: 0.4 K/UL (ref 0–1.3)
MONOCYTES RELATIVE PERCENT: 5.8 %
NEUTROPHILS ABSOLUTE: 5.7 K/UL (ref 1.7–7.7)
NEUTROPHILS RELATIVE PERCENT: 84.1 %
PDW BLD-RTO: 12.5 % (ref 12.4–15.4)
PLATELET # BLD: 203 K/UL (ref 135–450)
PMV BLD AUTO: 8.1 FL (ref 5–10.5)
POTASSIUM SERPL-SCNC: 4.2 MMOL/L (ref 3.5–5.1)
RBC # BLD: 3.06 M/UL (ref 4–5.2)
SODIUM BLD-SCNC: 136 MMOL/L (ref 136–145)
TOTAL PROTEIN: 7.7 G/DL (ref 6.4–8.2)
WBC # BLD: 6.8 K/UL (ref 4–11)

## 2022-01-05 RX ORDER — LANOLIN ALCOHOL/MO/W.PET/CERES
325 CREAM (GRAM) TOPICAL
Qty: 30 TABLET | Refills: 3 | Status: ON HOLD | OUTPATIENT
Start: 2022-01-05 | End: 2022-04-21 | Stop reason: HOSPADM

## 2022-01-10 NOTE — PROGRESS NOTES
27 y/o  female at 24 weeks 5 days gestation with [facility] 2021 presents for prenatal visit    History is positive for gestational diabetes in 2nd pregnancy. Crohns (cellular level, lacks amino acids)  and Sjogrens disease--stable. Has been seeing functional medical doctor and has discontinued all medications. Has not experienced a Crohns flare x 1 year with the exception of the week prior to [place]. Diet is gluten free. Patient is 12 months s/p colposcopy, cervical conization secondary to HGSIL (CIN2)  on colposcopy, ECC, and cervical biopsies. Continues to perform glucose monitoring:  Glucose levels continue to be normal.   Denies vaginal bleeding, loss of fluid, contractions. Admits to baseline [employer]. 3-4 x per day   Admits to fetal movement. Admits to persistent constipation. Has noted heart palpitations. Blood pressure is low when palpitations occur. Denies fever, chills, chest pain, shortness of breath, nausea, vomiting, dysuria, and hematuria. Denies headache, vision changes, and RUQ pain. Maternal Wellness Questionnaire reviewed--no concerns. Declines tdap and flu vaccinations. Diagnosis Orders   1. Prenatal care, subsequent pregnancy in second trimester  EKG 12 lead   2. Irregular heart rate  EKG 12 lead    CBC Auto Differential    COMPREHENSIVE METABOLIC PANEL    HEMOGLOBIN A1C   3. History of gestational diabetes     4. S/P conization of cervix     5. Crohn's disease with complication, unspecified gastrointestinal tract location ([facility]     6. Sjogren's syndrome, with unspecified organ involvement ([facility]       Orders Placed This Encounter   Procedures    CBC Auto Differential    COMPREHENSIVE METABOLIC PANEL    HEMOGLOBIN A1C    EKG 12 lead     Glucose monitoring reviewed--patient to continue with twice daily monitoring  Follow up prn and 4 weeks for prenatal visit.

## 2022-02-01 ENCOUNTER — ROUTINE PRENATAL (OUTPATIENT)
Dept: OBGYN CLINIC | Age: 32
End: 2022-02-01

## 2022-02-01 ENCOUNTER — TELEPHONE (OUTPATIENT)
Dept: OBGYN CLINIC | Age: 32
End: 2022-02-01

## 2022-02-01 VITALS
HEART RATE: 87 BPM | SYSTOLIC BLOOD PRESSURE: 106 MMHG | BODY MASS INDEX: 24.21 KG/M2 | WEIGHT: 150 LBS | DIASTOLIC BLOOD PRESSURE: 64 MMHG

## 2022-02-01 DIAGNOSIS — M35.00 SJOGREN'S SYNDROME, WITH UNSPECIFIED ORGAN INVOLVEMENT (HCC): ICD-10-CM

## 2022-02-01 DIAGNOSIS — Z34.83 PRENATAL CARE, SUBSEQUENT PREGNANCY IN THIRD TRIMESTER: Primary | ICD-10-CM

## 2022-02-01 DIAGNOSIS — Z86.32 HISTORY OF GESTATIONAL DIABETES: ICD-10-CM

## 2022-02-01 DIAGNOSIS — Z98.890 S/P CONIZATION OF CERVIX: ICD-10-CM

## 2022-02-01 DIAGNOSIS — K50.919 CROHN'S DISEASE WITH COMPLICATION, UNSPECIFIED GASTROINTESTINAL TRACT LOCATION (HCC): ICD-10-CM

## 2022-02-01 PROCEDURE — 0502F SUBSEQUENT PRENATAL CARE: CPT | Performed by: OBSTETRICS & GYNECOLOGY

## 2022-02-01 NOTE — TELEPHONE ENCOUNTER
Patient returned call to office, stated she felt fine and would rather flush her system before possibly putting anything in her body. Did not want the culture. Stated she would call back if it gets worse.

## 2022-02-01 NOTE — TELEPHONE ENCOUNTER
Called patient to see if she was having any urinary issues today. I have a clean catch urine and need to know if we need to culture. Routing to clinical pool.

## 2022-02-04 PROBLEM — Z34.83 PRENATAL CARE, SUBSEQUENT PREGNANCY IN THIRD TRIMESTER: Status: ACTIVE | Noted: 2021-11-13

## 2022-02-05 NOTE — PROGRESS NOTES
29 y/o  female at 29 weeks 5 days gestation with [facility] 2021 presents for prenatal visit    History is positive for gestational diabetes in 2nd pregnancy. Crohns (cellular level, lacks amino acids)  and Sjogrens disease--stable. Has been seeing functional medical doctor and has discontinued all medications. Has not experienced a Crohns flare x 1 year with the exception of the week prior to [place]. Diet is gluten free. Patient is 12 months s/p colposcopy, cervical conization secondary to HGSIL (CIN2)  on colposcopy, ECC, and cervical biopsies. Continues to perform glucose monitoring:  Fasting: < 90 (80-90's)  1 hour postprandial: < 120's  Denies vaginal bleeding, loss of fluid, contractions. Admits to baseline [employer] hourly and pressure. Admits to fetal movement. Admits to persistent constipation. Palpitations have improved with increase in hydration. Did not have EKG--forgot. Denies fever, chills, chest pain, shortness of breath, nausea, vomiting, dysuria, and hematuria. Denies headache, vision changes, and RUQ pain. Maternal Wellness Questionnaire reviewed--no concerns. Declines tdap and flu vaccinations. Diagnosis Orders   1. Prenatal care, subsequent pregnancy in third trimester     2. History of gestational diabetes     3. S/P conization of cervix     4. Crohn's disease with complication, unspecified gastrointestinal tract location ([address]     5. Sjogren's syndrome, with unspecified organ involvement ([address]       Labor precautions, kick counts. Follow up prn and 2 weeks for prenatal visit.

## 2022-02-15 ENCOUNTER — ROUTINE PRENATAL (OUTPATIENT)
Dept: OBGYN CLINIC | Age: 32
End: 2022-02-15

## 2022-02-15 VITALS
HEART RATE: 99 BPM | WEIGHT: 152 LBS | DIASTOLIC BLOOD PRESSURE: 60 MMHG | SYSTOLIC BLOOD PRESSURE: 102 MMHG | BODY MASS INDEX: 24.53 KG/M2

## 2022-02-15 DIAGNOSIS — Z34.83 PRENATAL CARE, SUBSEQUENT PREGNANCY IN THIRD TRIMESTER: Primary | ICD-10-CM

## 2022-02-15 DIAGNOSIS — Z88.0 PENICILLIN ALLERGY: ICD-10-CM

## 2022-02-15 DIAGNOSIS — M35.00 SJOGREN'S SYNDROME, WITH UNSPECIFIED ORGAN INVOLVEMENT (HCC): ICD-10-CM

## 2022-02-15 DIAGNOSIS — O99.019 ANEMIA AFFECTING PREGNANCY, ANTEPARTUM: ICD-10-CM

## 2022-02-15 DIAGNOSIS — K50.919 CROHN'S DISEASE WITH COMPLICATION, UNSPECIFIED GASTROINTESTINAL TRACT LOCATION (HCC): ICD-10-CM

## 2022-02-15 DIAGNOSIS — Z86.32 HISTORY OF GESTATIONAL DIABETES: ICD-10-CM

## 2022-02-15 DIAGNOSIS — Z98.890 S/P CONIZATION OF CERVIX: ICD-10-CM

## 2022-02-15 PROCEDURE — 0502F SUBSEQUENT PRENATAL CARE: CPT | Performed by: OBSTETRICS & GYNECOLOGY

## 2022-02-15 NOTE — PROGRESS NOTES
Temp: 97.7  Maternal emotional well being screening form completed and reviewed with patient. Current score is 4. Patient given referral to [address] [place] [address] ([phone]):  No

## 2022-02-27 PROBLEM — O99.019 ANEMIA AFFECTING PREGNANCY, ANTEPARTUM: Status: ACTIVE | Noted: 2022-02-27

## 2022-02-28 PROBLEM — Z88.0 PENICILLIN ALLERGY: Status: ACTIVE | Noted: 2022-02-28

## 2022-02-28 NOTE — PROGRESS NOTES
29 y/o  female at 30 weeks 5 days gestation with [facility] 2021 presents for prenatal visit    History is positive for gestational diabetes in 2nd pregnancy, Crohns (cellular level, lacks amino acids), Sjogrens disease--stable per patient, and chronic borderline anemia with history of neutropenia. Has not experienced a Crohns flare x 1 year with the exception of the week prior to [place]. Diet is gluten free. Patient last seen by GI in 2021. Denies current symptoms of Sjogrens. Patient referred to hematology for anemia and history of neutropenia in 2021--patient deferred appointment and was ultimately not seen. 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 Patient has opted to perform glucose monitoring for positive history of gestational diabetes (2nd pregnancy) and elevated 1st trimester 1 hour screen. Patient opted for glucose monitoring throughout the pregnancy in lieu of 3 hour confirmatory test as well as repeat screening at 28 weeks. Fastin-86  1 hour postprandial: 120'-130's  Ongoing blood glucose values have not been consistent with gestational diabetes    Palpitations have improved with increase in hydration. Did not have EKG--stated at last visit that she had forgot. Admits to occasional palpitations--not as bad--does not feel EKG is necessary. Encouraged to have EKG performed. Denies vaginal bleeding, loss of fluid, contractions. Admits to baseline [employer] hourly and pressure. Admits to fetal movement. Admits to persistent constipation. Denies fever, chills, chest pain, shortness of breath, nausea, vomiting, dysuria, and hematuria. Denies headache, vision changes, and RUQ pain. Maternal Wellness Questionnaire reviewed--no concerns. Declines COVID, tdap and flu vaccinations. Declined NIPT testing. Taking iron supplement and colace. Diagnosis Orders   1. Prenatal care, subsequent pregnancy in third trimester     2. History of gestational diabetes     3. S/P conization of cervix     4. Crohn's disease with complication, unspecified gastrointestinal tract location ([facility]     5. Sjogren's syndrome, with unspecified organ involvement ([facility]     6. Anemia affecting pregnancy, antepartum     7. Penicillin allergy         On available chart review, Sjogren's was diagnosed in 2018 after symptoms were first noted in 2017. Patient was last seen by rheumatology via virtual visit in May 2021. Most recent available lab work was performed in 2020. Patient denies any symptoms/flares and attributes stability of condition to preventative plan of care set by functional medical doctor--[facility]. Lab work performed in 2018 and 2020 indicated positive results for anti SSA and anti SSB. Obstetric history is negative for fetal manifestations, heart block, etc with previous pregnancies. No signs of fetal cardiac compromise noted on imaging through 21-22 weeks gestation. Plan:   EKG  Consider fetal echo  Weekly antepartum fetal surveillance   Growth ultrasound. CBC, iron and TIBC, hepatitis C  Varicella, TSH, urine drug screen, syphilis, Rubella, hepatitis B, HIV, and type and screen ordered but not resulted--will need updated  Continue prenatal vitamins and iron  Continue glucose monitoring.

## 2022-03-02 ENCOUNTER — ROUTINE PRENATAL (OUTPATIENT)
Dept: OBGYN CLINIC | Age: 32
End: 2022-03-02
Payer: OTHER GOVERNMENT

## 2022-03-02 ENCOUNTER — OFFICE VISIT (OUTPATIENT)
Dept: OBGYN CLINIC | Age: 32
End: 2022-03-02
Payer: OTHER GOVERNMENT

## 2022-03-02 VITALS
WEIGHT: 155.4 LBS | BODY MASS INDEX: 25.08 KG/M2 | SYSTOLIC BLOOD PRESSURE: 108 MMHG | HEART RATE: 82 BPM | DIASTOLIC BLOOD PRESSURE: 62 MMHG

## 2022-03-02 DIAGNOSIS — M35.00 SJOGREN'S SYNDROME, WITH UNSPECIFIED ORGAN INVOLVEMENT (HCC): ICD-10-CM

## 2022-03-02 DIAGNOSIS — Z98.890 S/P CONIZATION OF CERVIX: ICD-10-CM

## 2022-03-02 DIAGNOSIS — Z34.83 PRENATAL CARE, SUBSEQUENT PREGNANCY IN THIRD TRIMESTER: Primary | ICD-10-CM

## 2022-03-02 DIAGNOSIS — Z86.32 HISTORY OF GESTATIONAL DIABETES: ICD-10-CM

## 2022-03-02 DIAGNOSIS — Z88.0 PENICILLIN ALLERGY: ICD-10-CM

## 2022-03-02 DIAGNOSIS — O99.013 ANEMIA AFFECTING PREGNANCY IN THIRD TRIMESTER: ICD-10-CM

## 2022-03-02 DIAGNOSIS — K50.919 CROHN'S DISEASE WITH COMPLICATION, UNSPECIFIED GASTROINTESTINAL TRACT LOCATION (HCC): ICD-10-CM

## 2022-03-02 DIAGNOSIS — O99.019 ANEMIA AFFECTING PREGNANCY, ANTEPARTUM: ICD-10-CM

## 2022-03-02 PROCEDURE — 76816 OB US FOLLOW-UP PER FETUS: CPT | Performed by: OBSTETRICS & GYNECOLOGY

## 2022-03-02 PROCEDURE — 0502F SUBSEQUENT PRENATAL CARE: CPT | Performed by: OBSTETRICS & GYNECOLOGY

## 2022-03-02 PROCEDURE — 36415 COLL VENOUS BLD VENIPUNCTURE: CPT | Performed by: OBSTETRICS & GYNECOLOGY

## 2022-03-02 NOTE — PROGRESS NOTES
Temp: 97.9  Maternal emotional well being screening form completed and reviewed with patient. Current score is 0. Patient given referral to [address] [place] [address] ([phone]): No    Patient presents to office for urine drug screen. Patient escorted to exam room, personal belongings left if room and door closed. Excorted patient to restroom. Clean catch urine collection explained. Patient notified to not flush toilet or wash hands and bring specimen out of restroom. Patient initialed ID labels and witnessed application of labels to specimen. Urine temperature read: 96.0 degree F. Patient permitted to return to restroom to flush toilet and wash hands. Escorted patient back to exam room. Kita Roche MA     Blood draw from L Cephalic x 1 attempt without difficulty. 4 SST, 2 LAV tubes drawn. Patient tolerated well.  Kita Roche MA

## 2022-03-03 LAB
ABO/RH: NORMAL
AMPHETAMINE SCREEN, URINE: NORMAL
ANTI-SS-A IGG: >8 AI (ref 0–0.9)
ANTI-SS-B IGG: >8 AI (ref 0–0.9)
ANTIBODY SCREEN: NORMAL
BARBITURATE SCREEN URINE: NORMAL
BASOPHILS ABSOLUTE: 0 K/UL (ref 0–0.2)
BASOPHILS RELATIVE PERCENT: 0.3 %
BENZODIAZEPINE SCREEN, URINE: NORMAL
BUPRENORPHINE URINE: NORMAL
CANNABINOID SCREEN URINE: NORMAL
COCAINE METABOLITE SCREEN URINE: NORMAL
EOSINOPHILS ABSOLUTE: 0 K/UL (ref 0–0.6)
EOSINOPHILS RELATIVE PERCENT: 0.3 %
ESTIMATED AVERAGE GLUCOSE: 99.7 MG/DL
FOLATE: 11.38 NG/ML (ref 4.78–24.2)
HBA1C MFR BLD: 5.1 %
HCT VFR BLD CALC: 30 % (ref 36–48)
HEMOGLOBIN: 9.9 G/DL (ref 12–16)
HEPATITIS B SURFACE ANTIGEN INTERPRETATION: NORMAL
HIV AG/AB: NORMAL
HIV ANTIGEN: NORMAL
HIV-1 ANTIBODY: NORMAL
HIV-2 AB: NORMAL
IRON SATURATION: 34 % (ref 15–50)
IRON: 131 UG/DL (ref 37–145)
LYMPHOCYTES ABSOLUTE: 0.6 K/UL (ref 1–5.1)
LYMPHOCYTES RELATIVE PERCENT: 9.7 %
Lab: NORMAL
MCH RBC QN AUTO: 30 PG (ref 26–34)
MCHC RBC AUTO-ENTMCNC: 32.8 G/DL (ref 31–36)
MCV RBC AUTO: 91.3 FL (ref 80–100)
METHADONE SCREEN, URINE: NORMAL
MONOCYTES ABSOLUTE: 0.3 K/UL (ref 0–1.3)
MONOCYTES RELATIVE PERCENT: 5.8 %
NEUTROPHILS ABSOLUTE: 4.9 K/UL (ref 1.7–7.7)
NEUTROPHILS RELATIVE PERCENT: 83.9 %
OPIATE SCREEN URINE: NORMAL
OXYCODONE URINE: NORMAL
PDW BLD-RTO: 13.7 % (ref 12.4–15.4)
PH UA: 6.5
PHENCYCLIDINE SCREEN URINE: NORMAL
PLATELET # BLD: 225 K/UL (ref 135–450)
PMV BLD AUTO: 8.4 FL (ref 5–10.5)
PROPOXYPHENE SCREEN: NORMAL
RBC # BLD: 3.29 M/UL (ref 4–5.2)
RUBELLA ANTIBODY IGG: <0.2 IU/ML
TOTAL IRON BINDING CAPACITY: 390 UG/DL (ref 260–445)
TOTAL SYPHILLIS IGG/IGM: NORMAL
TSH REFLEX: 1.32 UIU/ML (ref 0.27–4.2)
VARICELLA-ZOSTER VIRUS AB, IGG: NORMAL
WBC # BLD: 5.8 K/UL (ref 4–11)

## 2022-03-04 LAB
HEPATITIS C VIRUS AB BY CIA INDEX: 0.05 IV
HEPATITIS C VIRUS AB BY CIA INTERPRETATION: NEGATIVE

## 2022-03-07 NOTE — PROGRESS NOTES
29 y/o  female at 28 weeks 6 days gestation with [facility], [place] 2021 presents for prenatal visit    History is positive for gestational diabetes in 2nd pregnancy, Crohns (cellular level, lacks amino acids), Sjogrens disease--stable per patient, and chronic borderline anemia with history of neutropenia. Has not experienced a Crohns flare x 1 year with the exception of the week prior to [place]. Diet is gluten free. Patient last seen by GI in 2021. Denies current symptoms of Sjogrens. Patient referred to hematology for anemia and history of neutropenia in 2021--patient deferred appointment and was ultimately not seen. 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 Patient has opted to perform glucose monitoring for positive history of gestational diabetes (2nd pregnancy) and elevated 1st trimester 1 hour screen. Patient opted for glucose monitoring throughout the pregnancy in lieu of 3 hour confirmatory test as well as repeat screening at 28 weeks. Glucose levels:  Fastin's  1 hour postprandial: 's  Ongoing blood glucose values have not been consistent with gestational diabetes    Sjogren's was diagnosed in 2018 after symptoms were first noted in 2017. Patient was last seen by rheumatology via virtual visit in May 2021. Most recent available lab work was performed in . Patient denies any symptoms/flares and attributes stability of condition to preventative plan of care set by functional medical doctor--[facility]. Lab work performed in 2018 and  indicated positive results for anti SSA and anti SSB. Obstetric history is negative for fetal manifestations, heart block, etc with previous pregnancies. No signs of fetal cardiac compromise noted on imaging through 21-22 weeks gestation. Palpitations have improved with increase in hydration.  Did not have EKG--stated at last visit that she had forgot. Admits to occasional palpitations. Encouraged to have EKG performed. Denies vaginal bleeding, loss of fluid, contractions. Admits to baseline [employer] hourly and pressure. Admits to fetal movement. Admits to persistent constipation. Denies fever, chills, chest pain, shortness of breath, nausea, vomiting, dysuria, and hematuria. Denies headache, vision changes, and RUQ pain. Maternal Wellness Questionnaire reviewed--no concerns. Declines COVID, tdap and flu vaccinations. Declined NIPT testing. Taking iron supplement and colace. OBSTETRIC ULTRASOUND GROWTH    DATE: 3/2/22    PHYSICIAN: Luther Saini D.O.    SONOGRAPHER: MS    INDICATION: growth    COMPARISON: 12/8/21    TYPE OF SCAN: abdominal    FINDINGS:  Fetal Number:  1  Fetal Presentation: Vertex  Estimated Fetal Weight:  2100 grams, percentile:  45.5%  Amniotic Fluid Index:  14.93 centimeters  Placenta Location:  anterior  A viable intrauterine pregnancy is noted with fetal heart rate of 130 bpm.   The following values were obtained:   BPD      8.35 cm  33 weeks 4 day(s)   Head Circumference    30.59 cm  34 weeks 0 day(s)    Abdominal Circumference   29.36 cm  33 weeks 2 day(s)   Femur Length     6.14 cm  31 weeks 6 day(s)     EGA by ultrasound is 33 weeks 2 days. There is a 3 day discordance with the established EGA of 32 weeks 6 days. Anatomy seen: heart, liver, bladder, head  Cardiac and somatic movement are noted. IMPRESSION:  Live intrauterine pregnancy in 3rd trimester, adequate growth, adequate fluid. Imaging is limited secondary to gestational age. The patient is well aware of the limitations of ultrasound in the detection of anomalies. Diagnosis Orders   1.  Prenatal care, subsequent pregnancy in third trimester  Type and Screen    CBC with Auto Differential    Rubella antibody, IgG    Syphilis Antibody Cascading Reflex    Hepatitis B Surface Antigen    TSH with Reflex    HIV Screen    Drug Screen Multi Urine With Bup    Varicella Zoster Antibody, IgG    Anti SSA    Anti SSB    Iron and TIBC    Folate    Hemoglobin A1C    Hep C AB RLFX HCV PCR-A   2. Sjogren's syndrome, with unspecified organ involvement (HCC)  Anti SSA    Anti SSB    Iron and TIBC    Folate    Hep C AB RLFX HCV PCR-A   3. History of gestational diabetes  Hemoglobin A1C   4. Anemia affecting pregnancy in third trimester  CBC with Auto Differential    Iron and TIBC    Folate   5. S/P conization of cervix     6. Penicillin allergy     7. Crohn's disease with complication, unspecified gastrointestinal tract location ([facility] [address]     8. Anemia affecting pregnancy, antepartum       Orders Placed This Encounter   Procedures    CBC with Auto Differential    Rubella antibody, IgG    Syphilis Antibody Cascading Reflex    Hepatitis B Surface Antigen    TSH with Reflex    HIV Screen    Drug Screen Multi Urine With Bup    Varicella Zoster Antibody, IgG    Anti SSA    Anti SSB    Iron and TIBC    Folate    Hemoglobin A1C    Hep C AB RLFX HCV PCR-A    Type and Screen     Await laboratory results  Fetal echo scheduled for 3/7/2022 8 am.  EKG, consider maternal echo. Await EKG result. Weekly antepartum surveillance  Growth scan reassuring today  Continue glucose monitoring. Follow up prn and 1 week for prenatal visit and fetal monitoring.

## 2022-03-10 ENCOUNTER — ROUTINE PRENATAL (OUTPATIENT)
Dept: OBGYN CLINIC | Age: 32
End: 2022-03-10

## 2022-03-10 ENCOUNTER — OFFICE VISIT (OUTPATIENT)
Dept: OBGYN CLINIC | Age: 32
End: 2022-03-10
Payer: OTHER GOVERNMENT

## 2022-03-10 VITALS
DIASTOLIC BLOOD PRESSURE: 60 MMHG | SYSTOLIC BLOOD PRESSURE: 114 MMHG | HEART RATE: 85 BPM | BODY MASS INDEX: 25.37 KG/M2 | WEIGHT: 157.2 LBS

## 2022-03-10 DIAGNOSIS — M35.00 SJOGREN'S SYNDROME, WITH UNSPECIFIED ORGAN INVOLVEMENT (HCC): Primary | ICD-10-CM

## 2022-03-10 DIAGNOSIS — Z34.83 PRENATAL CARE, SUBSEQUENT PREGNANCY IN THIRD TRIMESTER: Primary | ICD-10-CM

## 2022-03-10 DIAGNOSIS — Z98.890 S/P CONIZATION OF CERVIX: ICD-10-CM

## 2022-03-10 DIAGNOSIS — K50.919 CROHN'S DISEASE WITH COMPLICATION, UNSPECIFIED GASTROINTESTINAL TRACT LOCATION (HCC): ICD-10-CM

## 2022-03-10 DIAGNOSIS — Z86.32 HISTORY OF GESTATIONAL DIABETES: ICD-10-CM

## 2022-03-10 DIAGNOSIS — M35.00 SJOGREN'S SYNDROME, WITH UNSPECIFIED ORGAN INVOLVEMENT (HCC): ICD-10-CM

## 2022-03-10 DIAGNOSIS — O35.BXX0 ABNORMAL FETAL ECHOCARDIOGRAPHY AFFECTING ANTEPARTUM CARE OF MOTHER, SINGLE OR UNSPECIFIED FETUS: ICD-10-CM

## 2022-03-10 PROCEDURE — 76819 FETAL BIOPHYS PROFIL W/O NST: CPT | Performed by: OBSTETRICS & GYNECOLOGY

## 2022-03-10 PROCEDURE — 0502F SUBSEQUENT PRENATAL CARE: CPT | Performed by: OBSTETRICS & GYNECOLOGY

## 2022-03-10 NOTE — PROGRESS NOTES
Temp-97.8F infrared  LPN requested patient's glucose log numbers, patients states, \"They're good. I check them a couple of times a day. \"

## 2022-03-10 NOTE — PROGRESS NOTES
Return OB Office Visit    CC:   Chief Complaint   Patient presents with    Routine Prenatal Visit       HPI:  Pt seen and examined. No concerns/complaints. Denies VB, LOF. Occasional [place] contractions. +FM. Otherwise doing well. History is positive for gestational diabetes in 2nd pregnancy (checking BG this pregnancy), Crohns (cellular level, lacks amino acids), Sjogrens disease--stable per patient, and chronic borderline anemia with history of neutropenia. Had a fetal echo since last visit, normal structure but elevated MCA dopplers. Was told by them this could suggest fetal anemia, no additional follow-up scheduled at that time. Maternal wellness questionnaire reviewed - no concerns today. Score 1. Objective:  /60   Pulse 85   Wt 157 lb 3.2 oz (71.3 kg)   LMP 07/15/2021   BMI 25.37 kg/m²   Gen: AO, NAD  Abd: Soft, NT  FHT: 145  Ext: Mild LE edema  SVE: closed/long/high    Assessment/Plan:  28 y.o. [id number] at 34w0d (Estimated Date of Delivery: 4/21/22) presents for RABIA appointment:      Diagnosis Orders   1. Prenatal care, subsequent pregnancy in third trimester     2. Sjogren's syndrome, with unspecified organ involvement ([address]     3. S/P conization of cervix     4. History of gestational diabetes     5. Crohn's disease with complication, unspecified gastrointestinal tract location ([address]     6. Abnormal fetal echocardiography affecting antepartum care of mother, single or unspecified fetus       Doing well today, FWB reassuring on BPP 8/8 today  - recent echo completed 3/7/22, normal fetal cardiac anatomy however pt with elevated MCA dopplers (1.2 MoM).  Will refer to MFM for this new finding, will call their office as well to ensure pt gets scheduled   - Has weekly ANFS scheduled  - no BG logs with patient today, encouraged to bring to all subsequent appointments     Dispo: RTC pending [facility] recommendations, has appt in 1 week for weekly BPP  Rogers Galloway MD

## 2022-03-11 ENCOUNTER — TELEPHONE (OUTPATIENT)
Dept: OBGYN CLINIC | Age: 32
End: 2022-03-11

## 2022-03-11 NOTE — TELEPHONE ENCOUNTER
Patient called and is having questions about her MF visit and wants to discuss her concerns with you. Please advise. She is asking for you to call her.      Routing to

## 2022-03-17 ENCOUNTER — OFFICE VISIT (OUTPATIENT)
Dept: OBGYN CLINIC | Age: 32
End: 2022-03-17
Payer: OTHER GOVERNMENT

## 2022-03-17 ENCOUNTER — ROUTINE PRENATAL (OUTPATIENT)
Dept: OBGYN CLINIC | Age: 32
End: 2022-03-17

## 2022-03-17 DIAGNOSIS — O99.019 ANEMIA AFFECTING PREGNANCY, ANTEPARTUM: ICD-10-CM

## 2022-03-17 DIAGNOSIS — M35.00 SJOGREN'S SYNDROME, WITH UNSPECIFIED ORGAN INVOLVEMENT (HCC): ICD-10-CM

## 2022-03-17 DIAGNOSIS — Z34.83 PRENATAL CARE, SUBSEQUENT PREGNANCY IN THIRD TRIMESTER: Primary | ICD-10-CM

## 2022-03-17 DIAGNOSIS — J06.9 UPPER RESPIRATORY TRACT INFECTION, UNSPECIFIED TYPE: Primary | ICD-10-CM

## 2022-03-17 DIAGNOSIS — K50.919 CROHN'S DISEASE WITH COMPLICATION, UNSPECIFIED GASTROINTESTINAL TRACT LOCATION (HCC): ICD-10-CM

## 2022-03-17 DIAGNOSIS — Z98.890 S/P CONIZATION OF CERVIX: ICD-10-CM

## 2022-03-17 DIAGNOSIS — Z34.83 PRENATAL CARE, SUBSEQUENT PREGNANCY IN THIRD TRIMESTER: ICD-10-CM

## 2022-03-17 DIAGNOSIS — Z86.32 HISTORY OF GESTATIONAL DIABETES: ICD-10-CM

## 2022-03-17 PROCEDURE — 0502F SUBSEQUENT PRENATAL CARE: CPT | Performed by: OBSTETRICS & GYNECOLOGY

## 2022-03-17 PROCEDURE — 76819 FETAL BIOPHYS PROFIL W/O NST: CPT | Performed by: OBSTETRICS & GYNECOLOGY

## 2022-03-17 RX ORDER — AZITHROMYCIN 250 MG/1
250 TABLET, FILM COATED ORAL SEE ADMIN INSTRUCTIONS
Qty: 6 TABLET | Refills: 0 | Status: SHIPPED | OUTPATIENT
Start: 2022-03-17 | End: 2022-03-22

## 2022-03-18 ENCOUNTER — OFFICE VISIT (OUTPATIENT)
Dept: OBGYN CLINIC | Age: 32
End: 2022-03-18
Payer: OTHER GOVERNMENT

## 2022-03-18 ENCOUNTER — ROUTINE PRENATAL (OUTPATIENT)
Dept: OBGYN CLINIC | Age: 32
End: 2022-03-18

## 2022-03-18 VITALS — DIASTOLIC BLOOD PRESSURE: 76 MMHG | BODY MASS INDEX: 25.5 KG/M2 | SYSTOLIC BLOOD PRESSURE: 118 MMHG | WEIGHT: 158 LBS

## 2022-03-18 DIAGNOSIS — R93.89 ABNORMAL ULTRASOUND: ICD-10-CM

## 2022-03-18 DIAGNOSIS — Z86.32 HISTORY OF GESTATIONAL DIABETES: ICD-10-CM

## 2022-03-18 DIAGNOSIS — O99.019 ANEMIA AFFECTING PREGNANCY, ANTEPARTUM: ICD-10-CM

## 2022-03-18 DIAGNOSIS — O99.013 ANEMIA AFFECTING PREGNANCY IN THIRD TRIMESTER: ICD-10-CM

## 2022-03-18 DIAGNOSIS — Z88.0 PENICILLIN ALLERGY: ICD-10-CM

## 2022-03-18 DIAGNOSIS — K50.919 CROHN'S DISEASE WITH COMPLICATION, UNSPECIFIED GASTROINTESTINAL TRACT LOCATION (HCC): ICD-10-CM

## 2022-03-18 DIAGNOSIS — O41.03X0 OLIGOHYDRAMNIOS IN THIRD TRIMESTER, SINGLE OR UNSPECIFIED FETUS: Primary | ICD-10-CM

## 2022-03-18 DIAGNOSIS — Z34.83 PRENATAL CARE, SUBSEQUENT PREGNANCY IN THIRD TRIMESTER: Primary | ICD-10-CM

## 2022-03-18 DIAGNOSIS — Z98.890 S/P CONIZATION OF CERVIX: ICD-10-CM

## 2022-03-18 DIAGNOSIS — M35.00 SJOGREN'S SYNDROME, WITH UNSPECIFIED ORGAN INVOLVEMENT (HCC): ICD-10-CM

## 2022-03-18 PROCEDURE — 0502F SUBSEQUENT PRENATAL CARE: CPT | Performed by: OBSTETRICS & GYNECOLOGY

## 2022-03-18 PROCEDURE — 76819 FETAL BIOPHYS PROFIL W/O NST: CPT | Performed by: OBSTETRICS & GYNECOLOGY

## 2022-03-18 NOTE — PROGRESS NOTES
27 y/o  female at 35 weeks 0 days gestation with [facility] 2021 presents for prenatal visit and fetal monitoring  History is positive for gestational diabetes in 2nd pregnancy, Crohns (cellular level, lacks amino acids), Sjogrens disease--stable per patient, and chronic borderline anemia with history of neutropenia. Has not experienced a Crohns flare x 1 year with the exception of the week prior to [place]. Diet is gluten free. Patient last seen by GI in 2021. Denies current symptoms of Sjogrens. Patient referred to hematology for anemia and history of neutropenia in 2021--patient deferred appointment and was ultimately not seen. Patient seen on 3/7/2022 for fetal echo--see notes (elevated MCA)  Patient subsequently seen on 3/14/22 for MFM consult--see notes; normal MCA. Risk of  lupus syndrome reviewed with patient. 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 Patient has opted to perform glucose monitoring for positive history of gestational diabetes (2nd pregnancy) and elevated 1st trimester 1 hour screen. Patient opted for glucose monitoring throughout the pregnancy in lieu of 3 hour confirmatory test as well as repeat screening at 28 weeks. Glucose levels stable:  Fastin's  1 hour postprandial: 's  Ongoing blood glucose values have not been consistent with gestational diabetes    Sjogren's was diagnosed in 2018 after symptoms were first noted in 2017. Patient was last seen by rheumatology via virtual visit in May 2021. Most recent available lab work was performed in . Patient denies any symptoms/flares and attributes stability of condition to preventative plan of care set by functional medical doctor--[facility]. Lab work performed in  and  indicated positive results for anti SSA and anti SSB.  Obstetric history is negative for fetal manifestations, heart block, etc with previous pregnancies. No signs of fetal cardiac compromise noted on imaging through 21-22 weeks gestation. Denies vaginal bleeding, loss of fluid, contractions. Admits to fetal movement. Admits to baseline [employer] hourly and pressure. Has noted leakage with cough/sneeze--feels leakage is urinary but unsure. Has been dealing with upper respiratory infection for the past 10 days--did not feel well for a week then subsequently noted symptoms of sinus infection. Admits to persistent constipation. Denies fever, chills, chest pain, shortness of breath, nausea, vomiting, dysuria, and hematuria. Denies headache, vision changes, and RUQ pain. Maternal Wellness Questionnaire reviewed--no concerns. Declines COVID, tdap and flu vaccinations. Declined NIPT testing. Taking iron supplement and colace. OB ULTRASOUND:  BIOPHYSICAL PROFILE    DATE: 3/17/22    PHYSICIAN: TOMEKA Pacheco D.O.    SONOGRAPHER: [facility]    INDICATION: Fetal well being    TYPE OF SCAN: abdominal    BPP: 8/8  Tone: 2, Gross fetal movement: 2, Breathin, Fluid: 2    FINDINGS:  A single viable intrauterine pregnancy is noted in cephalic presentation. Cardiac and somatic activity are noted. The following values were obtained:   Fetal heart rate 157bpm   Amniotic fluid index 7.41cm    IMPRESSION:   Single live IUP in the third trimester. BPP 8/8. ERIN 7.41cm. Imaging is limited secondary to fetal position. The patient is well aware of the limitations of ultrasound in the detection of anomalies. Diagnosis Orders   1. Upper respiratory tract infection, unspecified type  azithromycin (ZITHROMAX) 250 MG tablet   2. Prenatal care, subsequent pregnancy in third trimester     3. Sjogren's syndrome, with unspecified organ involvement ([facility] [address]     4. S/P conization of cervix     5. History of gestational diabetes     6.  Crohn's disease with complication, unspecified gastrointestinal tract location ([facility] [address]     7. Anemia affecting pregnancy, antepartum       Rx Zithromax--URI  Ultrasound result reviewed:  ERIN 7. Options discussed: admission to hospital for IV hydration and repeat ultrasound in AM, versus hydration at home with repeat ultrasound in office in AM.   Patient would like to hydrate at home and follow up. Labor precautions and kick counts reviewed.    Follow up in AM.

## 2022-03-18 NOTE — PROGRESS NOTES
28 y.o. [id number] at 35w1d EGA Estimated Date of Delivery: 22 here for RABIA:     Pt seen and examined. Is here today for repeat ultrasound after finding of ERIN of 7.4 yesterday. Primary OB encouraged her to drink p.o. fluids at home and come in today for repeat scan. Otherwise she has no concerns/complaints. Denies VB, LOF, CTX. Endorses (+) FM. Denies fevers / chills / chest pain / shortness of breath. Denies HA, changes with vision, RUQ pain, edema. MWQ reviewed     History is positive for gestational diabetes in 2nd pregnancy, Crohns (cellular level, lacks amino acids), Sjogrens disease--stable per patient, and chronic borderline anemia with history of neutropenia. Has not experienced a Crohns flare x 1 year with the exception of the week prior to [place]. Diet is gluten free. Patient last seen by GI in 2021. Denies current symptoms of Sjogrens. Patient referred to hematology for anemia and history of neutropenia in 2021--patient deferred appointment and was ultimately not seen. Patient seen on 3/7/2022 for fetal echo--see notes (elevated MCA)  Patient subsequently seen on 3/14/22 for MFM consult--see notes; normal MCA.     Risk of  lupus syndrome reviewed with patient.     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 Objective:  /76   Wt 158 lb (71.7 kg)   LMP 07/15/2021   BMI 25.50 kg/m²   Gen: AO, NAD  Abd: Soft, NT, gravid   Ext: Mild LE edema  OMM: Increased lumbar lordosis    Images:  OB ULTRASOUND:  BIOPHYSICAL PROFILE    DATE: 2022    PHYSICIAN: BILLIE Medrano.    SONOGRAPHER: Yanely Jean RDMS    INDICATION: Fetal well being    TYPE OF SCAN: abdominal    BPP: 8/8  Tone: 2, Gross fetal movement: 2, Breathin, Fluid: 2    FINDINGS:  A single viable intrauterine pregnancy is noted in cephalic presentation. Cardiac and somatic activity are noted.      The following values were obtained:   Fetal heart rate 129bpm   Amniotic fluid index 7.36cm    IMPRESSION:   Single live IUP in the third trimester. BPP 8/8. ERIN 7.36cm -- stable with last assessment. Assessment/Plan:   Diagnosis Orders   1. Prenatal care, subsequent pregnancy in third trimester     2. Abnormal ultrasound (Borderline Fluid)      3. Sjogren's syndrome, with unspecified organ involvement ([facility] [address]     4. S/P conization of cervix     5. History of gestational diabetes     6. Crohn's disease with complication, unspecified gastrointestinal tract location ([facility] [address]     7. Anemia affecting pregnancy, antepartum     8. Anemia affecting pregnancy in third trimester     9. Penicillin allergy        -Reviewed ultrasound results in detail with patient. Overall her fluid is normal and ERNI of 7.36 cm. (Exam is made more reassuring by subjectively more fluid, a deepest vertical pocket of 3.63 and a BPP of 8 out of 8). Reviewed with her the option for inpatient IV hydration versus continued expectant management at home, which I believe to be reasonable at this juncture. Patient has an appointment early next week for close follow-up and a repeat ERIN. -She admits to reassuring fetal movement and denies vaginal bleeding gushes of fluid or regular contractions.  -Does admit that in her previous pregnancies she had \"a slow leak of amniotic fluid prior to labor that she did not appreciate\". I reassured her that if this was the case today we would likely see a decrease in her overall ERIN despite hydration. But regardless she has been counseled on the return precautions and will call us with any concerns. Follow Up  Return OB precautions reviewed   Return in about 3 days (around 3/21/2022) for Return OB visit, Ultrasound. Approximately 30 minutes spent in room counseling patient on condition and coordination of care with over 50% in direct face to face counseling.      Tiff Diaz DO

## 2022-03-25 ENCOUNTER — ROUTINE PRENATAL (OUTPATIENT)
Dept: OBGYN CLINIC | Age: 32
End: 2022-03-25

## 2022-03-25 ENCOUNTER — OFFICE VISIT (OUTPATIENT)
Dept: OBGYN CLINIC | Age: 32
End: 2022-03-25
Payer: OTHER GOVERNMENT

## 2022-03-25 VITALS
HEART RATE: 91 BPM | DIASTOLIC BLOOD PRESSURE: 62 MMHG | WEIGHT: 162.4 LBS | BODY MASS INDEX: 26.21 KG/M2 | SYSTOLIC BLOOD PRESSURE: 90 MMHG

## 2022-03-25 DIAGNOSIS — Z34.83 PRENATAL CARE, SUBSEQUENT PREGNANCY IN THIRD TRIMESTER: Primary | ICD-10-CM

## 2022-03-25 DIAGNOSIS — Z98.890 S/P CONIZATION OF CERVIX: ICD-10-CM

## 2022-03-25 DIAGNOSIS — K50.919 CROHN'S DISEASE WITH COMPLICATION, UNSPECIFIED GASTROINTESTINAL TRACT LOCATION (HCC): ICD-10-CM

## 2022-03-25 DIAGNOSIS — Z88.0 PENICILLIN ALLERGY: ICD-10-CM

## 2022-03-25 DIAGNOSIS — Z86.32 HISTORY OF GESTATIONAL DIABETES: ICD-10-CM

## 2022-03-25 DIAGNOSIS — O99.019 ANEMIA AFFECTING PREGNANCY, ANTEPARTUM: ICD-10-CM

## 2022-03-25 DIAGNOSIS — M35.00 SJOGREN'S SYNDROME, WITH UNSPECIFIED ORGAN INVOLVEMENT (HCC): ICD-10-CM

## 2022-03-25 DIAGNOSIS — M35.00 SJOGREN'S SYNDROME, WITH UNSPECIFIED ORGAN INVOLVEMENT (HCC): Primary | ICD-10-CM

## 2022-03-25 PROCEDURE — 76819 FETAL BIOPHYS PROFIL W/O NST: CPT | Performed by: OBSTETRICS & GYNECOLOGY

## 2022-03-25 PROCEDURE — 0502F SUBSEQUENT PRENATAL CARE: CPT | Performed by: OBSTETRICS & GYNECOLOGY

## 2022-03-25 NOTE — PROGRESS NOTES
Temp:97.5  Maternal emotional well being screening form completed and reviewed with patient. Current score is 1. Patient given referral to [address] [place] [address] ([phone]):  No

## 2022-03-28 LAB — GROUP B STREP CULTURE: NORMAL

## 2022-03-30 ENCOUNTER — HOSPITAL ENCOUNTER (OUTPATIENT)
Age: 32
Discharge: HOME | End: 2022-03-30
Attending: OBSTETRICS & GYNECOLOGY | Admitting: OBSTETRICS & GYNECOLOGY
Payer: OTHER GOVERNMENT

## 2022-03-30 ENCOUNTER — TELEPHONE (OUTPATIENT)
Dept: OBGYN CLINIC | Age: 32
End: 2022-03-30

## 2022-03-30 VITALS — DIASTOLIC BLOOD PRESSURE: 75 MMHG | TEMPERATURE: 98.3 F | HEART RATE: 88 BPM | SYSTOLIC BLOOD PRESSURE: 123 MMHG

## 2022-03-30 PROCEDURE — 99211 OFF/OP EST MAY X REQ PHY/QHP: CPT

## 2022-03-30 NOTE — PROGRESS NOTES
31yo  presented to triage from home with c/o SROM approx 9pm yesterday, 3/29. Has had a couple \"small gushes\" over night and today along with \"trickling\". Contractions off and on thru the night. Monitors placed. Hx obtained. Consents signed.

## 2022-03-30 NOTE — H&P
Department of Obstetrics and Gynecology   Obstetrics History and Physical        CHIEF COMPLAINT:  leakage of amniotic fluid? HISTORY OF PRESENT ILLNESS:      The patient is a 28 y.o. female at 36w7d. OB History        3    Para   2    Term   2            AB        Living   2       SAB        IAB        Ectopic        Molar        Multiple        Live Births   2            Patient presents with a chief complaint as above and is being obsevred for ?  ROM    Estimated Due Date: Estimated Date of Delivery: 22    PRENATAL CARE:    Complicated by: none    PAST OB HISTORY:  OB History        3    Para   2    Term   2            AB        Living   2       SAB        IAB        Ectopic        Molar        Multiple        Live Births   2                Past Medical History:        Diagnosis Date    Abnormal Pap smear of cervix     Anemia affecting pregnancy, antepartum 2022    [facility] [address]     Crohn's disease ([facility] [address]     Gestational diabetes 2018    Sjogren's syndrome ([facility] [address]      Past Surgical History:        Procedure Laterality Date    COLONOSCOPY      LEEP N/A 1/15/2021    COLPOSCOPY, CERVICAL CONIZATION, ENDOCERVICAL CURRETTINGS performed by Erick Gray DO at [facility] EXTRACTION       Allergies:  Amoxicillin and Pcn [penicillins]    Social History:    Social History     Socioeconomic History    Marital status:      Spouse name: Not on file    Number of children: 2    Years of education: Not on file    Highest education level: Not on file   Occupational History    Not on file   Tobacco Use    Smoking status: Never Smoker    Smokeless tobacco: Never Used   Vaping Use    Vaping Use: Never used   Substance and Sexual Activity    Alcohol use: Never    Drug use: Never    Sexual activity: Yes     Partners: Male     Birth control/protection: Condom     Comment:    Other Topics Concern    Not on file   Social History Narrative    Not on file     Social Determinants of Health     Financial Resource Strain:     Difficulty of Paying Living Expenses: Not on file   Food Insecurity:     Worried About Running Out of Food in the Last Year: Not on file    [place] of Food in the Last Year: Not on file   Transportation Needs:     Lack of Transportation (Medical): Not on file    Lack of Transportation (Non-Medical):  Not on file   Physical Activity:     Days of Exercise per Week: Not on file    Minutes of Exercise per Session: Not on file   Stress:     Feeling of Stress : Not on file   Social Connections:     Frequency of Communication with Friends and Family: Not on file    Frequency of Social Gatherings with Friends and Family: Not on file    Attends Confucianist Services: Not on file    Active Member of Clubs or Organizations: Not on file    Attends Club or Organization Meetings: Not on file    Marital Status: Not on file   Intimate Partner Violence:     Fear of Current or Ex-Partner: Not on file    Emotionally Abused: Not on file    Physically Abused: Not on file    Sexually Abused: Not on file   Housing Stability:     Unable to Pay for Housing in the Last Year: Not on file    Number of [place] in the Last Year: Not on file    Unstable Housing in the Last Year: Not on file     Family History:       Problem Relation Age of Onset    Diabetes Mother     Breast Cancer Mother     Mastectomy Mother         right    Hypertension Father     No Known Problems Sister     No Known Problems Brother     Arthritis Maternal Grandmother         osteoarthritis     Heart Disease Maternal Grandfather         arrhythimia     Hypertension Maternal Grandfather     Hypertension Paternal Grandmother     Arthritis Paternal Grandmother         \"autoimmune\"     Heart Attack Paternal Grandfather         76s    No Known Problems Brother      Medications Prior to Admission:  Medications Prior to Admission: ferrous sulfate (FE TABS 325) 325 (65 Fe) MG EC tablet, Take 1 tablet by mouth daily (with breakfast) (Patient not taking: Reported on 2/15/2022)  TRUEplus Lancets 33G MISC, USE FOUR TIMES DAILY AS DIRECTED  blood glucose test strips (FREESTYLE LITE) strip, TEST FIVE TIMES A DAY FASTING, POSTPRANDIAL AND AT BEDTIME PLUS ADDITIONAL TO ACCOMMODATE AS NEEDED TESTING  Blood Glucose Monitoring Suppl (FREESTYLE FREEDOM LITE) w/Device KIT, 1 kit by Does not apply route 5 times daily Testing 5 times daily, fasting, postprandial and hs. Lancets and strips needed. ferrous sulfate (IRON 325) 325 (65 Fe) MG tablet, Take 1 tablet by mouth daily (with breakfast) (Patient not taking: Reported on 2/15/2022)  Omega-3 Fatty Acids (FISH OIL PO), Take by mouth daily  VITAMIN D PO, Take by mouth daily    REVIEW OF SYSTEMS:    wnl    PHYSICAL EXAM:  Vitals:    22 1416   BP: 123/75   Pulse: 88   Temp: 98.3 °F (36.8 °C)   TempSrc: Oral     General appearance:  awake, alert, cooperative, no apparent distress, and appears stated age  Neurologic:  Awake, alert, oriented to name, place and time. Lungs:  No increased work of breathing, good air exchange  Abdomen:  Soft, non tender, gravid, consistent with her gestational age, EFW by Leopold's maneuver was 7#  Fetal heart rate:  Reassuring. Pelvis:  Adequate pelvis  Cervix: 1/50%/-3  Contraction frequency:  none  Membranes:  Intact  SSE neg x 2      ASSESSMENT AND PLAN:     @ 36 6/7 wks sent to L and D to r/o ROM  FHT-R, SSE neg x 2    Plan per primary OB. Dr. Howard Kaur notified.     Peter Olvera

## 2022-03-30 NOTE — PROGRESS NOTES
Dr Santhosh Browne here. Spec exam done with fern sample collected. SVE 1/50. Fern test-negative. Dr Maricarmen Montgomery paged to give results.

## 2022-03-30 NOTE — TELEPHONE ENCOUNTER
Pt calling because last night around 11:30 she had a gush of fluid. Was not sure if it was urine or not. She says her underwear is slightly damp still but she is unable to tell if leakage. She says contractions have not been frequent 1 hourly. 36w1D. Please advise if fern test needed in office v/s triage.

## 2022-03-31 ENCOUNTER — OFFICE VISIT (OUTPATIENT)
Dept: OBGYN CLINIC | Age: 32
End: 2022-03-31
Payer: OTHER GOVERNMENT

## 2022-03-31 ENCOUNTER — ROUTINE PRENATAL (OUTPATIENT)
Dept: OBGYN CLINIC | Age: 32
End: 2022-03-31

## 2022-03-31 VITALS
SYSTOLIC BLOOD PRESSURE: 106 MMHG | WEIGHT: 165.6 LBS | HEART RATE: 80 BPM | BODY MASS INDEX: 26.73 KG/M2 | DIASTOLIC BLOOD PRESSURE: 62 MMHG

## 2022-03-31 DIAGNOSIS — M35.00 SJOGREN'S SYNDROME, WITH UNSPECIFIED ORGAN INVOLVEMENT (HCC): Primary | ICD-10-CM

## 2022-03-31 DIAGNOSIS — M35.00 SJOGREN'S SYNDROME, WITH UNSPECIFIED ORGAN INVOLVEMENT (HCC): ICD-10-CM

## 2022-03-31 DIAGNOSIS — K50.919 CROHN'S DISEASE WITH COMPLICATION, UNSPECIFIED GASTROINTESTINAL TRACT LOCATION (HCC): ICD-10-CM

## 2022-03-31 DIAGNOSIS — Z34.83 PRENATAL CARE, SUBSEQUENT PREGNANCY IN THIRD TRIMESTER: ICD-10-CM

## 2022-03-31 DIAGNOSIS — N87.1 DYSPLASIA OF CERVIX, HIGH GRADE CIN 2: ICD-10-CM

## 2022-03-31 DIAGNOSIS — Z88.0 PENICILLIN ALLERGY: ICD-10-CM

## 2022-03-31 DIAGNOSIS — Z98.890 S/P CONIZATION OF CERVIX: ICD-10-CM

## 2022-03-31 DIAGNOSIS — O99.019 ANEMIA AFFECTING PREGNANCY, ANTEPARTUM: ICD-10-CM

## 2022-03-31 DIAGNOSIS — Z34.83 PRENATAL CARE, SUBSEQUENT PREGNANCY IN THIRD TRIMESTER: Primary | ICD-10-CM

## 2022-03-31 DIAGNOSIS — Z86.32 HISTORY OF GESTATIONAL DIABETES: ICD-10-CM

## 2022-03-31 PROCEDURE — 0502F SUBSEQUENT PRENATAL CARE: CPT | Performed by: OBSTETRICS & GYNECOLOGY

## 2022-03-31 PROCEDURE — 76816 OB US FOLLOW-UP PER FETUS: CPT | Performed by: OBSTETRICS & GYNECOLOGY

## 2022-04-04 ENCOUNTER — ROUTINE PRENATAL (OUTPATIENT)
Dept: OBGYN CLINIC | Age: 32
End: 2022-04-04

## 2022-04-04 ENCOUNTER — OFFICE VISIT (OUTPATIENT)
Dept: OBGYN CLINIC | Age: 32
End: 2022-04-04
Payer: OTHER GOVERNMENT

## 2022-04-04 VITALS
HEART RATE: 88 BPM | WEIGHT: 163.2 LBS | SYSTOLIC BLOOD PRESSURE: 98 MMHG | DIASTOLIC BLOOD PRESSURE: 64 MMHG | BODY MASS INDEX: 26.34 KG/M2

## 2022-04-04 DIAGNOSIS — Z86.32 HISTORY OF GESTATIONAL DIABETES: ICD-10-CM

## 2022-04-04 DIAGNOSIS — Z34.83 PRENATAL CARE, SUBSEQUENT PREGNANCY IN THIRD TRIMESTER: Primary | ICD-10-CM

## 2022-04-04 DIAGNOSIS — Z88.0 PENICILLIN ALLERGY: ICD-10-CM

## 2022-04-04 DIAGNOSIS — Z98.890 S/P CONIZATION OF CERVIX: ICD-10-CM

## 2022-04-04 DIAGNOSIS — K50.919 CROHN'S DISEASE WITH COMPLICATION, UNSPECIFIED GASTROINTESTINAL TRACT LOCATION (HCC): ICD-10-CM

## 2022-04-04 DIAGNOSIS — M35.00 SJOGREN'S SYNDROME, WITH UNSPECIFIED ORGAN INVOLVEMENT (HCC): ICD-10-CM

## 2022-04-04 PROCEDURE — 76819 FETAL BIOPHYS PROFIL W/O NST: CPT | Performed by: OBSTETRICS & GYNECOLOGY

## 2022-04-04 PROCEDURE — 0502F SUBSEQUENT PRENATAL CARE: CPT | Performed by: OBSTETRICS & GYNECOLOGY

## 2022-04-04 NOTE — PROGRESS NOTES
Temp:97.7   Maternal emotional well being screening form completed and reviewed with patient. Current score is 1. Patient given referral to [address] [place] [address] ([phone]):  No

## 2022-04-04 NOTE — PROGRESS NOTES
29 y/o [id number] female at 37 weeks 0 days gestation with [facility] 2021 presents for prenatal visit and ultrasound--growth  History is positive for gestational diabetes in 2nd pregnancy, Crohns (cellular level, lacks amino acids), Sjogrens disease--stable per patient, and chronic borderline anemia with history of neutropenia. Has not experienced a Crohns flare x >1 year with the exception of the week prior to [place]. Diet is gluten free. Patient last seen by GI in 2021. Denies current symptoms of Sjogrens. Patient referred to hematology for anemia and history of neutropenia in 2021--patient deferred appointment and was ultimately not seen. Patient seen on 3/7/2022 for fetal echo--see notes (elevated MCA)  Patient subsequently seen on 3/14/22 for MFM consult--see notes; normal MCA. Risk of  lupus syndrome reviewed with patient. 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 Patient has opted to perform glucose monitoring for positive history of gestational diabetes (2nd pregnancy) and elevated 1st trimester 1 hour screen. Patient opted for glucose monitoring throughout the pregnancy in lieu of 3 hour confirmatory test as well as repeat screening at 28 weeks. Glucose levels stable  Ongoing blood glucose values have not been consistent with gestational diabetes    Sjogren's was diagnosed in 2018 after symptoms were first noted in 2017. Patient was last seen by rheumatology via virtual visit in May 2021. Most recent available lab work was performed in . Patient denies any symptoms/flares and attributes stability of condition to preventative plan of care set by functional medical doctor--[facility]. Lab work performed in  and  indicated positive results for anti SSA and anti SSB.  Obstetric history is negative for fetal manifestations, heart block, etc with previous pregnancies. No signs of fetal cardiac compromise noted on imaging through 21-22 weeks gestation. Denies vaginal bleeding, loss of fluid, contractions. Admits to fetal movement. Admits to baseline [employer] hourly and pressure. Has noted leakage with cough/sneeze--feels leakage is urinary but unsure. Cough has lingered. Seen in triage on Wednesday--negative for PROM. 1 cm/50 on Wednesday. Denies fever, chills, chest pain, shortness of breath, nausea, vomiting, dysuria, and hematuria. Denies headache, vision changes, and RUQ pain. Maternal Wellness Questionnaire reviewed--no concerns. Declines COVID, tdap and flu vaccinations. Declined NIPT testing. Taking iron supplement and colace. OBSTETRIC ULTRASOUND GROWTH    DATE: 3/31/22    PHYSICIAN: TOMEKA Pacheco D.O.     SONOGRAPHER: [facility]    INDICATION: Growth, BPP    TYPE OF SCAN: abdominal    FINDINGS:  A single viable intrauterine pregnancy is noted in cephalic presentation. Cardiac and somatic activity are noted. The following values were obtained:   Fetal heart rate    125 bpm   BPD      9.05cm  56.6 %   Head Circumference    32.11cm  11.1 %    Abdominal Circumference   32.17cm  37.2 %   Femur Length     7.12cm  35.1 %   Amniotic fluid index    8.01cm   EFW      2894g  36.9 percentile    Amniotic fluid volume is normal. Based on sonographic criteria the estimated fetal age is 36 weeks and 3 days with EDC of 4/25/22. There is a 4 day discordance with the established EDC of 4/21/22. The patient has an anterior placenta that is adequate distance in relation to the internal cervical os. The evaluation of the lower uterine segment and cervix reveals normal appearing anatomy. The uterus is unremarkable/gravid. Maternal ovaries and adnexae are not well visualized due to the size of the uterus and patient's gravid state.     Anatomy seen includes: heart, stomach, kidneys, bladder    BPP: 8/8  Tone: 2, Gross fetal movement: 2, Breathin, Fluid: 2    IMPRESSION:  Single live IUP in the third trimester. Adequate interval fetal growth. Imaging is limited secondary to fetal position. The patient is well aware of the limitations of ultrasound in the detection of anomalies. Diagnosis Orders   1. Prenatal care, subsequent pregnancy in third trimester     2. S/P conization of cervix     3. Sjogren's syndrome, with unspecified organ involvement ([address]     4. Penicillin allergy     5. History of gestational diabetes     6. Crohn's disease with complication, unspecified gastrointestinal tract location ([facility] [address]     7. Anemia affecting pregnancy, antepartum     8. Dysplasia of cervix, high grade ANTONIA 2       Labor precautions, kick counts  Follow up prn and 1 week for prenatal visit.

## 2022-04-05 NOTE — PROGRESS NOTES
27 y/o  female at 40 weeks 4 days gestation with [facility] 2021 presents for prenatal visit and fetal monitoring  History is positive for gestational diabetes in 2nd pregnancy, Crohns (cellular level, lacks amino acids), Sjogrens disease--stable per patient, and chronic borderline anemia with history of neutropenia. Has not experienced a Crohns flare x >1 year with the exception of the week prior to [place]. Diet is gluten free. Patient last seen by GI in 2021. Denies current symptoms of Sjogrens. Patient referred to hematology for anemia and history of neutropenia in 2021--patient deferred appointment and was ultimately not seen. Patient seen on 3/7/2022 for fetal echo--see notes (elevated MCA)  Patient subsequently seen on 3/14/22 for MFM consult--see notes; normal MCA. Risk of  lupus syndrome reviewed with patient. 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 Patient has opted to perform glucose monitoring for positive history of gestational diabetes (2nd pregnancy) and elevated 1st trimester 1 hour screen. Patient opted for glucose monitoring throughout the pregnancy in lieu of 3 hour confirmatory test as well as repeat screening at 28 weeks. Glucose levels stable:  Fastin's  1 hour postprandial: 100-120's  Ongoing blood glucose values have not been consistent with gestational diabetes    Sjogren's was diagnosed in 2018 after symptoms were first noted in 2017. Patient was last seen by rheumatology via virtual visit in May 2021. Most recent available lab work was performed in . Patient denies any symptoms/flares and attributes stability of condition to preventative plan of care set by functional medical doctor--[facility]. Lab work performed in  and  indicated positive results for anti SSA and anti SSB.  Obstetric history is negative for fetal manifestations, heart block, etc with previous pregnancies. No signs of fetal cardiac compromise noted on imaging through 21-22 weeks gestation. Denies vaginal bleeding, loss of fluid. Admits to fetal movement. Noted contractions all last evening. Denies fever, chills, chest pain, shortness of breath, nausea, vomiting, dysuria, and hematuria. Has experienced a couple of migraine headaches this week. Denies, vision changes, and RUQ pain. Maternal Wellness Questionnaire reviewed--no concerns. Declines COVID, tdap and flu vaccinations. Declined NIPT testing. Taking iron supplement and colace. Maternal Wellness Questionnaire reviewed--no concerns. --2 vertex    OB ULTRASOUND:  BIOPHYSICAL PROFILE       DATE: 2022       PHYSICIAN: TOMEKA PENNINGTON       SONOGRAPHER: TRIXIE Zheng [facility]       INDICATION: Fetal well being       TYPE OF SCAN: abdominal       BPP: 8/8   Tone: 2, Gross fetal movement: 2, Breathin, Fluid: 2       FINDINGS:   A single viable intrauterine pregnancy is noted in cephalic presentation. Cardiac and somatic activity are noted.        The following values were obtained:   Fetal heart rate 136 bpm   Amniotic fluid index 8.82cm       IMPRESSION:    Single live IUP in the third trimester. BPP 8/8. ERIN 8.82cm.       Imaging is limited secondary to fetal position. The patient is well aware of the limitations of ultrasound in the detection of anomalies.        Diagnosis Orders   1. Prenatal care, subsequent pregnancy in third trimester     2. Sjogren's syndrome, with unspecified organ involvement ([address]     3. S/P conization of cervix     4. Penicillin allergy     5. History of gestational diabetes     6.  Crohn's disease with complication, unspecified gastrointestinal tract location [facility])       Labor precautions, kick counts  Follow up prn and 1 weeks for prenatal visit

## 2022-04-11 NOTE — PROGRESS NOTES
29 y/o  female at 39 weeks 1 day gestation with [facility] 2021 presents for prenatal visit and fetal monitoring  History is positive for gestational diabetes in 2nd pregnancy, Crohns (cellular level, lacks amino acids), Sjogrens disease--stable per patient, chronic borderline anemia with history of neutropenia and low fluid on ERIN in the third trimester. Has not experienced a Crohns flare x 1 year with the exception of the week prior to [place]. Diet is gluten free. Patient last seen by GI in 2021. Denies current symptoms of Sjogrens. Patient referred to hematology for anemia and history of neutropenia in 2021--patient deferred appointment and was ultimately not seen. Patient seen on 3/7/2022 for fetal echo--see notes (elevated MCA)  Patient subsequently seen on 3/14/22 for MFM consult--see notes; normal MCA. Risk of  lupus syndrome reviewed with patient. 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 Patient has opted to perform glucose monitoring for positive history of gestational diabetes (2nd pregnancy) and elevated 1st trimester 1 hour screen. Patient opted for glucose monitoring throughout the pregnancy in lieu of 3 hour confirmatory test as well as repeat screening at 28 weeks. Glucose levels stable:  Fastin's  1 hour postprandial: 's  Ongoing blood glucose values have not been consistent with gestational diabetes    Sjogren's was diagnosed in 2018 after symptoms were first noted in 2017. Patient was last seen by rheumatology via virtual visit in May 2021. Most recent available lab work was performed in . Patient denies any symptoms/flares and attributes stability of condition to preventative plan of care set by functional medical doctor--[facility]. Lab work performed in  and  indicated positive results for anti SSA and anti SSB. Obstetric history is negative for fetal manifestations, heart block, etc with previous pregnancies. No signs of fetal cardiac compromise noted on imaging through 21-22 weeks gestation. Denies vaginal bleeding, loss of fluid, contractions. Admits to fetal movement. Admits to baseline [employer] and pressure. Admits to persistent constipation. Denies fever, chills, chest pain, shortness of breath, nausea, vomiting, dysuria, and hematuria. Denies headache, vision changes, and RUQ pain. Maternal Wellness Questionnaire reviewed--no concerns. Declines COVID, tdap and flu vaccinations. Declined NIPT testing. Taking iron supplement and colace. /-3 vertex  OB ULTRASOUND:  BIOPHYSICAL PROFILE       DATE: 3/25/22       PHYSICIAN: TOMEKA Henley       SONOGRAPHER: Lynn Stover RDMS       INDICATION: Fetal well being       TYPE OF SCAN: abdominal       BPP: 8/8   Tone: 2, Gross fetal movement: 2, Breathin, Fluid: 2       FINDINGS:   A single viable intrauterine pregnancy is noted in cephalic presentation. Cardiac and somatic activity are noted.        The following values were obtained:   Fetal heart rate  133 bpm   Amniotic fluid index 14.04 cm       IMPRESSION:    Single live IUP in the 3rd trimester. BPP 8/8. ERIN 14.04 cm.       Imaging is limited secondary to gestational age. 3/25   The patient is well aware of the limitations of ultrasound in the detection of anomalies.          Diagnosis Orders   1. Prenatal care, subsequent pregnancy in third trimester  Culture, Strep B Screen, Vaginal/Rectal   2. Anemia affecting pregnancy, antepartum     3. Crohn's disease with complication, unspecified gastrointestinal tract location ([address]     4. History of gestational diabetes     5. S/P conization of cervix     6. Penicillin allergy     7.  Sjogren's syndrome, with unspecified organ involvement ([address]       Orders Placed This Encounter   Procedures    Culture, Strep B Screen, Vaginal/Rectal       Continue to monitor glucose levels  Labor precautions, kick counts  Follow up prn and 1 week for prenatal visit.

## 2022-04-12 ENCOUNTER — OFFICE VISIT (OUTPATIENT)
Dept: OBGYN CLINIC | Age: 32
End: 2022-04-12
Payer: OTHER GOVERNMENT

## 2022-04-12 ENCOUNTER — ROUTINE PRENATAL (OUTPATIENT)
Dept: OBGYN CLINIC | Age: 32
End: 2022-04-12

## 2022-04-12 VITALS
DIASTOLIC BLOOD PRESSURE: 58 MMHG | SYSTOLIC BLOOD PRESSURE: 92 MMHG | BODY MASS INDEX: 26.79 KG/M2 | WEIGHT: 166 LBS | HEART RATE: 78 BPM

## 2022-04-12 DIAGNOSIS — K50.919 CROHN'S DISEASE WITH COMPLICATION, UNSPECIFIED GASTROINTESTINAL TRACT LOCATION (HCC): ICD-10-CM

## 2022-04-12 DIAGNOSIS — Z88.0 PENICILLIN ALLERGY: ICD-10-CM

## 2022-04-12 DIAGNOSIS — Z34.83 PRENATAL CARE, SUBSEQUENT PREGNANCY IN THIRD TRIMESTER: Primary | ICD-10-CM

## 2022-04-12 DIAGNOSIS — Z98.890 S/P CONIZATION OF CERVIX: ICD-10-CM

## 2022-04-12 DIAGNOSIS — O99.019 ANEMIA AFFECTING PREGNANCY, ANTEPARTUM: ICD-10-CM

## 2022-04-12 DIAGNOSIS — M35.00 SJOGREN'S SYNDROME, WITH UNSPECIFIED ORGAN INVOLVEMENT (HCC): Primary | ICD-10-CM

## 2022-04-12 DIAGNOSIS — Z86.32 HISTORY OF GESTATIONAL DIABETES: ICD-10-CM

## 2022-04-12 DIAGNOSIS — M35.00 SJOGREN'S SYNDROME, WITH UNSPECIFIED ORGAN INVOLVEMENT (HCC): ICD-10-CM

## 2022-04-12 PROCEDURE — 76819 FETAL BIOPHYS PROFIL W/O NST: CPT | Performed by: OBSTETRICS & GYNECOLOGY

## 2022-04-12 PROCEDURE — 0502F SUBSEQUENT PRENATAL CARE: CPT | Performed by: OBSTETRICS & GYNECOLOGY

## 2022-04-14 NOTE — PROGRESS NOTES
pregnancies. No signs of fetal cardiac compromise noted on imaging through 21-22 weeks gestation. Denies vaginal bleeding, loss of fluid. Admits to fetal movement and irregular contractions  Denies fever, chills, chest pain, shortness of breath, nausea, vomiting, dysuria, and hematuria. Has experienced a couple of migraine headaches this week. Denies, vision changes, and RUQ pain. Maternal Wellness Questionnaire reviewed--no concerns. Declines COVID, tdap and flu vaccinations. Declined NIPT testing. Taking iron supplement and colace. Maternal Wellness Questionnaire reviewed--no concerns. 2-3/60/-2 vertex  OB ULTRASOUND:  BIOPHYSICAL PROFILE    DATE: 2022    PHYSICIAN: TOMEKA Pacheco D.O.    SONOGRAPHER: Manish Alarcon RDMS    INDICATION: Fetal well being    TYPE OF SCAN: abdominal    BPP: 8/8  Tone: 2, Gross fetal movement: 2, Breathin, Fluid: 2    FINDINGS:  A single viable intrauterine pregnancy is noted in cephalic presentation. Cardiac and somatic activity are noted. The following values were obtained:   Fetal heart rate 120 bpm   Amniotic fluid index 7.48cm    IMPRESSION:   Single live IUP in the third trimester. BPP 8/8. ERIN 7.48cm. Imaging is limited secondary to fetal position. The patient is well aware of the limitations of ultrasound in the detection of anomalies. Diagnosis Orders   1. Prenatal care, subsequent pregnancy in third trimester     2. Sjogren's syndrome, with unspecified organ involvement ([address]     3. S/P conization of cervix     4. Penicillin allergy     5. History of gestational diabetes     6. Crohn's disease with complication, unspecified gastrointestinal tract location ([address]     7. Anemia affecting pregnancy, antepartum       Options reviewed:  Expectant management versus induction of labor. Pitocin induction scheduled for 22. Risks and benefits discussed.    Labor precautions, kick counts  Follow up prn and 1 week for prenatal visit and fetal monitoring.

## 2022-04-17 ENCOUNTER — TELEPHONE (OUTPATIENT)
Dept: FAMILY MEDICINE CLINIC | Age: 32
End: 2022-04-17

## 2022-04-17 NOTE — TELEPHONE ENCOUNTER
I was contacted by the patient to the on-call service provider line. The patient was supposed to be placed in contact with the obstetrician on-call. Patient was concerned because she is having some greenish-white discharge from her vagina. No pain noted with the discharge. Patient was concerned for infection. I advised the  patient to contact [facility] main line to have a direct number provided by the  to the obstetrician on-call. I told the patient if her call gets kicked back to me a second time I will contact Ronald Munguia myself to determine who is the office attrition on-call in regard to the patient's questions and next step of action. Patient verbalized understanding and will contact [facility] to receive office attrition on-call number and if unable to be given number patient will call the initial line she called that was sent to me and will leave me a message.

## 2022-04-18 ENCOUNTER — TELEPHONE (OUTPATIENT)
Dept: OBGYN CLINIC | Age: 32
End: 2022-04-18

## 2022-04-18 NOTE — TELEPHONE ENCOUNTER
Patient called and stated her due date is Thursday, she has an appt Friday, and is being induced Saturday. Stated she is having green and yellow discharge with itching, slight odor, and burning as well. Please advise. Was given labor precautions.     Routing to Dr. Jim Flight

## 2022-04-19 NOTE — TELEPHONE ENCOUNTER
[phone] ([place])     Placed call to patient, verbalized understanding. No questions or concerns voiced.

## 2022-04-19 NOTE — TELEPHONE ENCOUNTER
She may want to try OTC yeast medication like Monistat--1 or 2 day course to see if this helps. Otherwise, will need to have her come in to be evaluated. Thanks.

## 2022-04-19 NOTE — TELEPHONE ENCOUNTER
[phone] (home)     Placed call to patient, patient stated she is having no changes, still having janak matthew, nothing painful, slightly closer together now. Is still having some yellow/green discharge. Slight discomfort when she walks. But the burning and the itching is driving her nuts. Please advise.      Routing to Dr. Sunita Oakes

## 2022-04-20 ENCOUNTER — ANESTHESIA EVENT (OUTPATIENT)
Dept: LABOR AND DELIVERY | Age: 32
End: 2022-04-20

## 2022-04-20 ENCOUNTER — HOSPITAL ENCOUNTER (INPATIENT)
Age: 32
LOS: 1 days | Discharge: HOME | End: 2022-04-21
Attending: OBSTETRICS & GYNECOLOGY | Admitting: OBSTETRICS & GYNECOLOGY
Payer: OTHER GOVERNMENT

## 2022-04-20 ENCOUNTER — ANESTHESIA (OUTPATIENT)
Dept: LABOR AND DELIVERY | Age: 32
End: 2022-04-20

## 2022-04-20 PROBLEM — Z37.9 NORMAL LABOR: Status: ACTIVE | Noted: 2022-04-20

## 2022-04-20 LAB
ABO/RH: NORMAL
AMPHETAMINE SCREEN, URINE: NORMAL
ANTIBODY SCREEN: NORMAL
BARBITURATE SCREEN URINE: NORMAL
BASOPHILS ABSOLUTE: 0 K/UL (ref 0–0.2)
BASOPHILS RELATIVE PERCENT: 0.4 %
BENZODIAZEPINE SCREEN, URINE: NORMAL
BUPRENORPHINE URINE: NORMAL
CANNABINOID SCREEN URINE: NORMAL
COCAINE METABOLITE SCREEN URINE: NORMAL
EOSINOPHILS ABSOLUTE: 0 K/UL (ref 0–0.6)
EOSINOPHILS RELATIVE PERCENT: 0.2 %
HCT VFR BLD CALC: 31.3 % (ref 36–48)
HEMOGLOBIN: 10.5 G/DL (ref 12–16)
LYMPHOCYTES ABSOLUTE: 0.8 K/UL (ref 1–5.1)
LYMPHOCYTES RELATIVE PERCENT: 10.7 %
Lab: NORMAL
MCH RBC QN AUTO: 29.5 PG (ref 26–34)
MCHC RBC AUTO-ENTMCNC: 33.4 G/DL (ref 31–36)
MCV RBC AUTO: 88.4 FL (ref 80–100)
METHADONE SCREEN, URINE: NORMAL
MONOCYTES ABSOLUTE: 0.5 K/UL (ref 0–1.3)
MONOCYTES RELATIVE PERCENT: 6.4 %
NEUTROPHILS ABSOLUTE: 6.4 K/UL (ref 1.7–7.7)
NEUTROPHILS RELATIVE PERCENT: 82.3 %
OPIATE SCREEN URINE: NORMAL
OXYCODONE URINE: NORMAL
PDW BLD-RTO: 15.1 % (ref 12.4–15.4)
PH UA: 6
PHENCYCLIDINE SCREEN URINE: NORMAL
PLATELET # BLD: 215 K/UL (ref 135–450)
PMV BLD AUTO: 9.2 FL (ref 5–10.5)
PROPOXYPHENE SCREEN: NORMAL
RBC # BLD: 3.54 M/UL (ref 4–5.2)
SARS-COV-2, NAAT: NOT DETECTED
TOTAL SYPHILLIS IGG/IGM: NORMAL
WBC # BLD: 7.8 K/UL (ref 4–11)

## 2022-04-20 PROCEDURE — 59400 OBSTETRICAL CARE: CPT | Performed by: OBSTETRICS & GYNECOLOGY

## 2022-04-20 PROCEDURE — 86780 TREPONEMA PALLIDUM: CPT

## 2022-04-20 PROCEDURE — 2500000003 HC RX 250 WO HCPCS

## 2022-04-20 PROCEDURE — 85025 COMPLETE CBC W/AUTO DIFF WBC: CPT

## 2022-04-20 PROCEDURE — 80307 DRUG TEST PRSMV CHEM ANLYZR: CPT

## 2022-04-20 PROCEDURE — 0UQMXZZ REPAIR VULVA, EXTERNAL APPROACH: ICD-10-PCS | Performed by: OBSTETRICS & GYNECOLOGY

## 2022-04-20 PROCEDURE — 0HQ9XZZ REPAIR PERINEUM SKIN, EXTERNAL APPROACH: ICD-10-PCS | Performed by: OBSTETRICS & GYNECOLOGY

## 2022-04-20 PROCEDURE — 6370000000 HC RX 637 (ALT 250 FOR IP)

## 2022-04-20 PROCEDURE — 6370000000 HC RX 637 (ALT 250 FOR IP): Performed by: OBSTETRICS & GYNECOLOGY

## 2022-04-20 PROCEDURE — 86850 RBC ANTIBODY SCREEN: CPT

## 2022-04-20 PROCEDURE — 1220000000 HC SEMI PRIVATE OB R&B

## 2022-04-20 PROCEDURE — 87635 SARS-COV-2 COVID-19 AMP PRB: CPT

## 2022-04-20 PROCEDURE — 7200000001 HC VAGINAL DELIVERY

## 2022-04-20 PROCEDURE — 2500000003 HC RX 250 WO HCPCS: Performed by: OBSTETRICS & GYNECOLOGY

## 2022-04-20 PROCEDURE — 6360000002 HC RX W HCPCS

## 2022-04-20 PROCEDURE — 86901 BLOOD TYPING SEROLOGIC RH(D): CPT

## 2022-04-20 PROCEDURE — 86900 BLOOD TYPING SEROLOGIC ABO: CPT

## 2022-04-20 PROCEDURE — 51701 INSERT BLADDER CATHETER: CPT

## 2022-04-20 PROCEDURE — 2580000003 HC RX 258: Performed by: OBSTETRICS & GYNECOLOGY

## 2022-04-20 RX ORDER — SIMETHICONE 80 MG
80 TABLET,CHEWABLE ORAL 4 TIMES DAILY PRN
Status: DISCONTINUED | OUTPATIENT
Start: 2022-04-20 | End: 2022-04-21 | Stop reason: HOSPADM

## 2022-04-20 RX ORDER — HYDROCODONE BITARTRATE AND ACETAMINOPHEN 5; 325 MG/1; MG/1
1 TABLET ORAL EVERY 4 HOURS PRN
Status: DISCONTINUED | OUTPATIENT
Start: 2022-04-20 | End: 2022-04-20

## 2022-04-20 RX ORDER — DIPHENHYDRAMINE HYDROCHLORIDE 50 MG/ML
25 INJECTION INTRAMUSCULAR; INTRAVENOUS EVERY 4 HOURS PRN
Status: DISCONTINUED | OUTPATIENT
Start: 2022-04-20 | End: 2022-04-20

## 2022-04-20 RX ORDER — CLINDAMYCIN PHOSPHATE 900 MG/50ML
900 INJECTION INTRAVENOUS ONCE
Status: COMPLETED | OUTPATIENT
Start: 2022-04-20 | End: 2022-04-20

## 2022-04-20 RX ORDER — ACETAMINOPHEN 325 MG/1
650 TABLET ORAL EVERY 6 HOURS PRN
Status: DISCONTINUED | OUTPATIENT
Start: 2022-04-20 | End: 2022-04-21 | Stop reason: HOSPADM

## 2022-04-20 RX ORDER — IBUPROFEN 800 MG/1
800 TABLET ORAL EVERY 8 HOURS PRN
Status: DISCONTINUED | OUTPATIENT
Start: 2022-04-20 | End: 2022-04-21 | Stop reason: HOSPADM

## 2022-04-20 RX ORDER — IBUPROFEN 800 MG/1
TABLET ORAL
Status: COMPLETED
Start: 2022-04-20 | End: 2022-04-20

## 2022-04-20 RX ORDER — DOCUSATE SODIUM 100 MG/1
100 CAPSULE, LIQUID FILLED ORAL 2 TIMES DAILY
Status: DISCONTINUED | OUTPATIENT
Start: 2022-04-20 | End: 2022-04-20

## 2022-04-20 RX ORDER — HYDROCODONE BITARTRATE AND ACETAMINOPHEN 5; 325 MG/1; MG/1
2 TABLET ORAL EVERY 4 HOURS PRN
Status: DISCONTINUED | OUTPATIENT
Start: 2022-04-20 | End: 2022-04-20

## 2022-04-20 RX ORDER — SODIUM CHLORIDE, SODIUM LACTATE, POTASSIUM CHLORIDE, AND CALCIUM CHLORIDE .6; .31; .03; .02 G/100ML; G/100ML; G/100ML; G/100ML
500 INJECTION, SOLUTION INTRAVENOUS PRN
Status: DISCONTINUED | OUTPATIENT
Start: 2022-04-20 | End: 2022-04-20

## 2022-04-20 RX ORDER — SODIUM CHLORIDE 0.9 % (FLUSH) 0.9 %
5-40 SYRINGE (ML) INJECTION PRN
Status: DISCONTINUED | OUTPATIENT
Start: 2022-04-20 | End: 2022-04-21 | Stop reason: HOSPADM

## 2022-04-20 RX ORDER — LANOLIN 100 %
OINTMENT (GRAM) TOPICAL PRN
Status: DISCONTINUED | OUTPATIENT
Start: 2022-04-20 | End: 2022-04-21 | Stop reason: HOSPADM

## 2022-04-20 RX ORDER — SODIUM CHLORIDE, SODIUM LACTATE, POTASSIUM CHLORIDE, CALCIUM CHLORIDE 600; 310; 30; 20 MG/100ML; MG/100ML; MG/100ML; MG/100ML
INJECTION, SOLUTION INTRAVENOUS CONTINUOUS
Status: DISCONTINUED | OUTPATIENT
Start: 2022-04-20 | End: 2022-04-21 | Stop reason: HOSPADM

## 2022-04-20 RX ORDER — SODIUM CHLORIDE 0.9 % (FLUSH) 0.9 %
5-40 SYRINGE (ML) INJECTION EVERY 12 HOURS SCHEDULED
Status: DISCONTINUED | OUTPATIENT
Start: 2022-04-20 | End: 2022-04-21 | Stop reason: HOSPADM

## 2022-04-20 RX ORDER — SODIUM CHLORIDE, SODIUM LACTATE, POTASSIUM CHLORIDE, CALCIUM CHLORIDE 600; 310; 30; 20 MG/100ML; MG/100ML; MG/100ML; MG/100ML
INJECTION, SOLUTION INTRAVENOUS CONTINUOUS
Status: DISCONTINUED | OUTPATIENT
Start: 2022-04-20 | End: 2022-04-20

## 2022-04-20 RX ORDER — ONDANSETRON 2 MG/ML
4 INJECTION INTRAMUSCULAR; INTRAVENOUS EVERY 6 HOURS PRN
Status: DISCONTINUED | OUTPATIENT
Start: 2022-04-20 | End: 2022-04-20

## 2022-04-20 RX ORDER — FERROUS SULFATE 325(65) MG
325 TABLET ORAL
Status: DISCONTINUED | OUTPATIENT
Start: 2022-04-21 | End: 2022-04-21 | Stop reason: HOSPADM

## 2022-04-20 RX ORDER — GUAIFENESIN/DEXTROMETHORPHAN 100-10MG/5
5 SYRUP ORAL EVERY 4 HOURS PRN
Status: DISCONTINUED | OUTPATIENT
Start: 2022-04-20 | End: 2022-04-21 | Stop reason: HOSPADM

## 2022-04-20 RX ORDER — ACETAMINOPHEN 325 MG/1
650 TABLET ORAL EVERY 4 HOURS PRN
Status: DISCONTINUED | OUTPATIENT
Start: 2022-04-20 | End: 2022-04-20

## 2022-04-20 RX ORDER — SODIUM CHLORIDE 0.9 % (FLUSH) 0.9 %
5-40 SYRINGE (ML) INJECTION EVERY 12 HOURS SCHEDULED
Status: DISCONTINUED | OUTPATIENT
Start: 2022-04-20 | End: 2022-04-20

## 2022-04-20 RX ORDER — LIDOCAINE HYDROCHLORIDE 10 MG/ML
30 INJECTION, SOLUTION EPIDURAL; INFILTRATION; INTRACAUDAL; PERINEURAL PRN
Status: DISCONTINUED | OUTPATIENT
Start: 2022-04-20 | End: 2022-04-20

## 2022-04-20 RX ORDER — ONDANSETRON 4 MG/1
4 TABLET, ORALLY DISINTEGRATING ORAL EVERY 8 HOURS PRN
Status: DISCONTINUED | OUTPATIENT
Start: 2022-04-20 | End: 2022-04-21 | Stop reason: HOSPADM

## 2022-04-20 RX ORDER — IBUPROFEN 800 MG/1
800 TABLET ORAL EVERY 8 HOURS SCHEDULED
Status: DISCONTINUED | OUTPATIENT
Start: 2022-04-20 | End: 2022-04-20

## 2022-04-20 RX ORDER — MISOPROSTOL 100 UG/1
800 TABLET ORAL EVERY 4 HOURS
Status: COMPLETED | OUTPATIENT
Start: 2022-04-20 | End: 2022-04-20

## 2022-04-20 RX ORDER — TERBUTALINE SULFATE 1 MG/ML
0.25 INJECTION, SOLUTION SUBCUTANEOUS ONCE
Status: DISCONTINUED | OUTPATIENT
Start: 2022-04-20 | End: 2022-04-20

## 2022-04-20 RX ORDER — LIDOCAINE HYDROCHLORIDE 10 MG/ML
INJECTION, SOLUTION INFILTRATION; PERINEURAL
Status: COMPLETED
Start: 2022-04-20 | End: 2022-04-20

## 2022-04-20 RX ORDER — SODIUM CHLORIDE 9 MG/ML
INJECTION, SOLUTION INTRAVENOUS PRN
Status: DISCONTINUED | OUTPATIENT
Start: 2022-04-20 | End: 2022-04-21 | Stop reason: HOSPADM

## 2022-04-20 RX ORDER — DOCUSATE SODIUM 100 MG/1
100 CAPSULE, LIQUID FILLED ORAL 2 TIMES DAILY
Status: DISCONTINUED | OUTPATIENT
Start: 2022-04-20 | End: 2022-04-21 | Stop reason: HOSPADM

## 2022-04-20 RX ORDER — SODIUM CHLORIDE, SODIUM LACTATE, POTASSIUM CHLORIDE, AND CALCIUM CHLORIDE .6; .31; .03; .02 G/100ML; G/100ML; G/100ML; G/100ML
1000 INJECTION, SOLUTION INTRAVENOUS PRN
Status: DISCONTINUED | OUTPATIENT
Start: 2022-04-20 | End: 2022-04-20

## 2022-04-20 RX ORDER — SODIUM CHLORIDE 9 MG/ML
25 INJECTION, SOLUTION INTRAVENOUS PRN
Status: DISCONTINUED | OUTPATIENT
Start: 2022-04-20 | End: 2022-04-20

## 2022-04-20 RX ORDER — SODIUM CHLORIDE 0.9 % (FLUSH) 0.9 %
5-40 SYRINGE (ML) INJECTION PRN
Status: DISCONTINUED | OUTPATIENT
Start: 2022-04-20 | End: 2022-04-20

## 2022-04-20 RX ORDER — SIMETHICONE 80 MG
80 TABLET,CHEWABLE ORAL EVERY 6 HOURS PRN
Status: DISCONTINUED | OUTPATIENT
Start: 2022-04-20 | End: 2022-04-20

## 2022-04-20 RX ORDER — MISOPROSTOL 100 UG/1
TABLET ORAL
Status: COMPLETED
Start: 2022-04-20 | End: 2022-04-20

## 2022-04-20 RX ADMIN — MISOPROSTOL 800 MCG: 100 TABLET ORAL at 10:35

## 2022-04-20 RX ADMIN — MOXIFLOXACIN HYDROCHLORIDE 800 MG: 400 TABLET, FILM COATED ORAL at 20:40

## 2022-04-20 RX ADMIN — IBUPROFEN 800 MG: 800 TABLET, FILM COATED ORAL at 20:43

## 2022-04-20 RX ADMIN — SODIUM CHLORIDE, POTASSIUM CHLORIDE, SODIUM LACTATE AND CALCIUM CHLORIDE 1000 ML: 600; 310; 30; 20 INJECTION, SOLUTION INTRAVENOUS at 09:18

## 2022-04-20 RX ADMIN — Medication 87.3 MILLI-UNITS/MIN: at 10:13

## 2022-04-20 RX ADMIN — DOCUSATE SODIUM 100 MG: 100 CAPSULE, LIQUID FILLED ORAL at 20:40

## 2022-04-20 RX ADMIN — CLINDAMYCIN PHOSPHATE 900 MG: 900 INJECTION, SOLUTION INTRAVENOUS at 12:13

## 2022-04-20 RX ADMIN — BENZOCAINE AND LEVOMENTHOL: 200; 5 SPRAY TOPICAL at 12:21

## 2022-04-20 RX ADMIN — LIDOCAINE HYDROCHLORIDE 200 MG: 10 INJECTION, SOLUTION INFILTRATION; PERINEURAL at 10:10

## 2022-04-20 RX ADMIN — MOXIFLOXACIN HYDROCHLORIDE 800 MG: 400 TABLET, FILM COATED ORAL at 12:22

## 2022-04-20 RX ADMIN — GUAIFENESIN AND DEXTROMETHORPHAN 5 ML: 100; 10 SYRUP ORAL at 13:30

## 2022-04-20 RX ADMIN — GUAIFENESIN AND DEXTROMETHORPHAN 5 ML: 100; 10 SYRUP ORAL at 19:17

## 2022-04-20 ASSESSMENT — PAIN DESCRIPTION - ORIENTATION
ORIENTATION: LOWER
ORIENTATION: LOWER;MID
ORIENTATION: MID

## 2022-04-20 ASSESSMENT — PAIN DESCRIPTION - DESCRIPTORS
DESCRIPTORS: CRAMPING

## 2022-04-20 ASSESSMENT — PAIN SCALES - GENERAL
PAINLEVEL_OUTOF10: 2
PAINLEVEL_OUTOF10: 10
PAINLEVEL_OUTOF10: 2

## 2022-04-20 ASSESSMENT — PAIN DESCRIPTION - LOCATION
LOCATION: ABDOMEN
LOCATION: VAGINA;PERINEUM

## 2022-04-20 NOTE — PROGRESS NOTES
Dr. Baron Rothman at bedside in triage 2 at this time to perform SVE and rule out rupture. Pt 3/50/-2 and ruptured with clear fluid. Plan to move pt to 383.

## 2022-04-20 NOTE — PROGRESS NOTES
First time get up to BR w/ assist x 2 RN's. Pt voided 200 cc urine without difficulty. Pt performed eran care per self after instruction. New ice pack, peripad, and underwear provided. Pt back to bed w/ out incident, gait steady. Pt tolerated well.

## 2022-04-20 NOTE — ANESTHESIA PRE PROCEDURE
Department of Anesthesiology  Preprocedure Note       Name:  Giselle Cast   Age:  28 y.o.  :  1990                                          MRN:  [id number]         Date:  2022      Surgeon: * No surgeons listed *    Procedure: * No procedures listed *    Medications prior to admission:   Prior to Admission medications    Medication Sig Start Date End Date Taking? Authorizing Provider   ferrous sulfate (FE TABS 325) 325 (65 Fe) MG EC tablet Take 1 tablet by mouth daily (with breakfast)  Patient not taking: Reported on 2/15/2022 1/5/22   Myrna Pacheco, DO   TRUEplus Lancets 33G MISC USE FOUR TIMES DAILY AS DIRECTED 10/16/21   Lindsey Spine, DO   blood glucose test strips (FREESTYLE LITE) strip TEST FIVE TIMES A DAY FASTING, POSTPRANDIAL AND AT BEDTIME PLUS ADDITIONAL TO ACCOMMODATE AS NEEDED TESTING 10/16/21   Lindsey Spine, DO   Blood Glucose Monitoring Suppl (FREESTYLE FREEDOM LITE) w/Device KIT 1 kit by Does not apply route 5 times daily Testing 5 times daily, fasting, postprandial and hs. Lancets and strips needed.  10/14/21   Myrna Pacheco, DO   ferrous sulfate (IRON 325) 325 (65 Fe) MG tablet Take 1 tablet by mouth daily (with breakfast)  Patient not taking: Reported on 2/15/2022 10/12/21   Patricia Balderas DO   Omega-3 Fatty Acids (FISH OIL PO) Take by mouth daily    Historical Provider, MD   VITAMIN D PO Take by mouth daily    Historical Provider, MD       Current medications:    Current Facility-Administered Medications   Medication Dose Route Frequency Provider Last Rate Last Admin    terbutaline (BRETHINE) injection 0.25 mg  0.25 mg SubCUTAneous Once Roberta Olivo MD        lactated ringers infusion   IntraVENous Continuous Roberta Olivo MD        lactated ringers bolus  500 mL IntraVENous PRN Roberta Olivo MD        Or    lactated ringers bolus  1,000 mL IntraVENous PRN Roberta Olivo .9 mL/hr at 22 0918 1,000 mL at 22 0918    sodium chloride flush 0.9 % injection 5-40 mL  5-40 mL IntraVENous 2 times per day Dexter Oseguera MD        sodium chloride flush 0.9 % injection 5-40 mL  5-40 mL IntraVENous PRN Dexter Oseguera MD        0.9 % sodium chloride infusion  25 mL IntraVENous PRN Dexter Oseguera MD        lidocaine PF 1 % injection 30 mL  30 mL Other PRN Dexter Oseguera MD        ondansetron [facility]) injection 4 mg  4 mg IntraVENous Q6H PRN Dexter Oseguera MD        diphenhydrAMINE (BENADRYL) injection 25 mg  25 mg IntraVENous Q4H PRN Dexter Oseguera MD        acetaminophen (TYLENOL) tablet 650 mg  650 mg Oral Q4H PRN Dexter Oseguera MD        ibuprofen (ADVIL;MOTRIN) tablet 800 mg  800 mg Oral 3 times per day Dexter Oseguera MD        HYDROcodone-acetaminophen (NORCO) 5-325 MG per tablet 1 tablet  1 tablet Oral Q4H PRN Dexter Oseguera MD        Or   [facility] HYDROcodone-acetaminophen (NORCO) 5-325 MG per tablet 2 tablet  2 tablet Oral Q4H PRN Dexter Oseguera MD       [facility] witch hazel-glycerin (TUCKS) pad   Topical PRN Dexter Oseguera MD        benzocaine-menthol (DERMOPLAST) 20-0.5 % spray   Topical PRN Dexter Oseguera MD        simethicone (MYLICON) chewable tablet 80 mg  80 mg Oral Q6H PRN Dexter Oseguera MD        docusate sodium (COLACE) capsule 100 mg  100 mg Oral BID Dexter Oseguera MD        phenylephrine-mineral oil-petrolatum (PREPARATION H) rectal ointment   Rectal Q2H PRN Dexter Oseguera MD        hydrocortisone 2.5 % cream   Topical Q2H PRN Dexter Oseguera MD           Allergies:     Allergies   Allergen Reactions    Amoxicillin     Pcn [Penicillins] Anaphylaxis, Itching and Swelling     Throat starts to itch and swell       Problem List:    Patient Active Problem List   Diagnosis Code    Sjogren's syndrome ([facility] M35.00    Vaccination refused by patient Z28.21    Parotid gland enlargement K11.1    Serologic abnormality R89.4    Malaise R53.81    Generalized abdominal tenderness R10.817    Crohn's disease ([facility] K50.90    Allergic rhinitis J30.9    S/P conization of cervix Z98.890    Dysplasia of cervix, high grade ANTONIA 2 N87.1    History of gestational diabetes Z86.32    Postcoital and contact bleeding N93.0    Prenatal care, subsequent pregnancy in third trimester Z34.83    Anemia affecting pregnancy, antepartum O99.019    Penicillin allergy Z88.0    Normal labor O80, Z37.9       Past Medical History:        Diagnosis Date    Abnormal Pap smear of cervix     Anemia affecting pregnancy, antepartum 2/27/2022    Autoimmune disorder ([facility]     Crohn's disease ([facility]     Gestational diabetes 2018    Sjogren's syndrome ([facility]        Past Surgical History:        Procedure Laterality Date    COLONOSCOPY      LEEP N/A 1/15/2021    COLPOSCOPY, CERVICAL CONIZATION, ENDOCERVICAL CURRETTINGS performed by Fabrizio Pickard DO at [address] [place] [address]  2008       Social History:    Social History     Tobacco Use    Smoking status: Never Smoker    Smokeless tobacco: Never Used   Substance Use Topics    Alcohol use: Never                                Counseling given: Not Answered      Vital Signs (Current):   Vitals:    04/20/22 0640 04/20/22 0645 04/20/22 0724 04/20/22 0815   BP: 126/78  127/79 138/83   Pulse: 78  64 65   Resp:   15 14   Temp:   36.7 °C (98 °F) 36.6 °C (97.9 °F)   TempSrc:   Oral Oral   Weight:  165 lb (74.8 kg)     Height:  5' 6\" (1.676 m)                                                BP Readings from Last 3 Encounters:   04/20/22 138/83   04/12/22 (!) 92/58   04/04/22 98/64       NPO Status:                                                                                 BMI:   Wt Readings from Last 3 Encounters:   04/20/22 165 lb (74.8 kg)   04/12/22 166 lb (75.3 kg)   04/04/22 163 lb 3.2 oz (74 kg)     Body mass index is 26.63 kg/m².     CBC:   Lab Results   Component Value Date    WBC 7.8 04/20/2022    RBC 3.54 04/20/2022    HGB 10.5 04/20/2022    HCT 31.3 04/20/2022    MCV 88.4 04/20/2022    RDW 15.1 04/20/2022  04/20/2022       CMP:   Lab Results   Component Value Date     01/04/2022    K 4.2 01/04/2022     01/04/2022    CO2 20 01/04/2022    BUN 8 01/04/2022    CREATININE <0.5 01/04/2022    GFRAA >60 01/04/2022    AGRATIO 0.8 01/04/2022    LABGLOM >60 01/04/2022    GLUCOSE 79 01/04/2022    PROT 7.7 01/04/2022    CALCIUM 8.9 01/04/2022    BILITOT <0.2 01/04/2022    ALKPHOS 47 01/04/2022    AST 18 01/04/2022    ALT 9 01/04/2022       POC Tests: No results for input(s): POCGLU, POCNA, POCK, POCCL, POCBUN, POCHEMO, POCHCT in the last 72 hours. Coags: No results found for: PROTIME, INR, APTT    HCG (If Applicable):   Lab Results   Component Value Date    PREGTESTUR Negative 01/15/2021        ABGs: No results found for: PHART, PO2ART, JNE1WNK, WVH1ATG, BEART, A0VIXTQK     Type & Screen (If Applicable):  No results found for: LABABO, LABRH    Drug/Infectious Status (If Applicable):  No results found for: HIV, HEPCAB    COVID-19 Screening (If Applicable):   Lab Results   Component Value Date    COVID19 Detected 01/08/2021           Anesthesia Evaluation  Patient summary reviewed and Nursing notes reviewed no history of anesthetic complications:   Airway: Mallampati: II     Neck ROM: full   Dental: normal exam         Pulmonary:Negative Pulmonary ROS and normal exam                               Cardiovascular:Negative CV ROS                      Neuro/Psych:   Negative Neuro/Psych ROS              GI/Hepatic/Renal: Neg GI/Hepatic/Renal ROS            Endo/Other: Negative Endo/Other ROS   (+) Diabetes, . ROS comment: Sjoren's syndrome Abdominal:             Vascular: Other Findings:             Anesthesia Plan      epidural     ASA 3 - emergent     (I discussed with the patient the risks and benefits of labor epidural placement. All questions were answered the patient agrees with the plan.    Recent Vitals:  ---------------------------               04/20/22 0815         ---------------------------   BP:          138/83         Pulse:         65           Resp:          14           Temp:   36.6 °C (97.9 °F)  ---------------------------  Body mass index is 26.63 kg/m². Social History    Tobacco Use      Smoking status: Never Smoker      Smokeless tobacco: Never Used      LABS:    CBC  Lab Results       Component                Value               Date/Time                  WBC                      7.8                 04/20/2022 07:48 AM        HGB                      10.5 (L)            04/20/2022 07:48 AM        HCT                      31.3 (L)            04/20/2022 07:48 AM        PLT                      215                 04/20/2022 07:48 AM   RENAL  Lab Results       Component                Value               Date/Time                  NA                       136                 01/04/2022 01:56 PM        K                        4.2                 01/04/2022 01:56 PM        CL                       104                 01/04/2022 01:56 PM        CO2                      20 (L)              01/04/2022 01:56 PM        BUN                      8                   01/04/2022 01:56 PM        CREATININE               <0.5 (L)            01/04/2022 01:56 PM        GLUCOSE                  79                  01/04/2022 01:56 PM   COAGS  No results found for: PROTIME, INR, APTT)        Anesthetic plan and risks discussed with patient.                       SHAVON Escobar - RONEL   4/20/2022

## 2022-04-20 NOTE — L&D DELIVERY NOTE
[facility] Obstetrics and Gynecology  Spontaneous Vaginal Delivery Note        Pre-operative Diagnosis:    1. IUP at 39+6   2. SROM with labor     Post-operative Diagnosis:   1. Postpartum s/p              Anesthesia:  none      Admission and Delivery:       Patient presented to [address] [place] [address] and Delivery with complaint of ROM and labor on 22. Patient was admitted for labor. She was 3/40/-2 on admission. She received nothing for pain control. Patient progressed spontaneously and quickly to complete cervical dilation. I presented to room and pt was noted to be 9cm and very uncomfortable. Quickly progressed to complete and . Began pushing and with good maternal effort and the infant was delivered in the LYNNETTE position with 1 contraction. Nuchal cord was absent. The shoulders and body were quickly to follow with maternal efforts. Infant was delivered with good tone and spontaneous cry without complication. Mouth and nose were bulb suctioned at maternal abdomen. Cord segment and cord blood were obtained. APGARS were noted to be 8 and 9. Delivery Date and Time: 22 at 0952. The placenta was delivered spontaneously with manual massage of the uterus and was noted to be intact with a 3 vessel cord. Pitocin was started immediately after placenta delivery. Bleeding initially heavy, thought to be secondary to bleeding periurethral laceration. Lidocaine was used to numb the area and the periurethral laceration (midline and immediately superior to urethra) was repaired with 3-0 Vicryl. A catheter was placed during repair to displace urethra given close proximity to this. Hemostasis noted at this site, small 1st degree laceration then repaired without difficulty also with lidocaine and 3-0 Vicryl. Fundus firm however patient continued to have bleeding, sweep of lower uterine segment returned large amount of clot and following this bleeding was appropriate.  800mcg cytotec was placed for additional bleeding prophylaxis and patient will be given antibiotics as well for infection prophylaxis. The patient tolerated well and is in stable condition following delivery. EBL: 500 ml     Infant Wt: Pending     APGARS: 8, 9      Specimen:  Placenta / Cord segment     Infant Feeding: breast     Condition:  infant stable and mother stable      Attending Attestation: I performed the procedure.     Panfilo Murphy MD

## 2022-04-20 NOTE — PLAN OF CARE
Problem: Anxiety:  Goal: Level of anxiety will decrease  Description: Level of anxiety will decrease  Outcome: Ongoing     Problem: Breathing Pattern - Ineffective:  Goal: Able to breathe comfortably  Description: Able to breathe comfortably  Outcome: Ongoing     Problem: Breathing Pattern - Ineffective:  Goal: Able to breathe comfortably  Description: Able to breathe comfortably  Outcome: Ongoing     Problem: Fluid Volume - Imbalance:  Goal: Absence of imbalanced fluid volume signs and symptoms  Description: Absence of imbalanced fluid volume signs and symptoms  Outcome: Ongoing  Goal: Absence of intrapartum hemorrhage signs and symptoms  Description: Absence of intrapartum hemorrhage signs and symptoms  Outcome: Ongoing     Problem: Infection - Intrapartum Infection:  Goal: Will show no infection signs and symptoms  Description: Will show no infection signs and symptoms  Outcome: Ongoing     Problem: Labor Process - Prolonged:  Goal: Labor progression, first stage, within specified pattern  Description: Labor progression, first stage, within specified pattern  Outcome: Ongoing  Goal: Labor progession, second stage, within specified pattern  Description: Labor progession, second stage, within specified pattern  Outcome: Ongoing  Goal: Uterine contractions within specified parameters  Description: Uterine contractions within specified parameters  Outcome: Ongoing     Problem:  Screening:  Goal: Ability to make informed decisions regarding treatment has improved  Description: Ability to make informed decisions regarding treatment has improved  Outcome: Ongoing     Problem: Pain - Acute:  Goal: Pain level will decrease  Description: Pain level will decrease  Outcome: Ongoing  Goal: Able to cope with pain  Description: Able to cope with pain  Outcome: Ongoing     Problem: Tissue Perfusion - Uteroplacental, Altered:  Goal: Absence of abnormal fetal heart rate pattern  Description: Absence of abnormal fetal heart rate pattern  Outcome: Ongoing     Problem: Urinary Retention:  Goal: Experiences of bladder distention will decrease  Description: Experiences of bladder distention will decrease  Outcome: Ongoing  Goal: Urinary elimination within specified parameters  Description: Urinary elimination within specified parameters  Outcome: Ongoing

## 2022-04-20 NOTE — PROGRESS NOTES
2207 Patient in bathroom becoming increasingly uncomfortable and moaning out. Stated she didn't know if the baby was coming. Staff assist called to help patient get back to bed but then canceled due to pt walking self back to bed. MD entered and RN was checking pt, found to be 9cm at 0950 and called for table. At 2241 pt began involuntarily pushing and viable female infant delivered by Dr. Jennifer Mandel at 1427.

## 2022-04-20 NOTE — H&P
Obstetrics History and Physical    CC:   Chief Complaint   Patient presents with    Contractions       HPI:  Ceci Damon is a 28 y.o. Pauline Remy at 39w6d who presents with complaints of leakage of fluid and regular contractions. Patient reports she started feeling contractions every 3-5 minutes starting around 4AM.  States that she feels like she has been leaking clear fluid as well, that possibly started around 2AM.  Has had light spotting with contractions. +FM. Denies headache, vision changes, RUQ pain, increased LE edema. Denies chest pain, shortness of breath, fever, chills, nausea, vomiting. Pregnancy complicated by GDM in 2nd pregnancy, Crohns, Sjogrens, anemia, history of LEEP with positive margins. Elevated early 1hr GTT, elected not to proceed with 3 hr testing and has had stable accuchecks throughout. Review of Systems: The following ROS was otherwise negative, except as noted in the HPI: constitutional, HEENT, respiratory, cardiovascular, gastrointestinal, genitourinary, skin, musculoskeletal, neurological, psych    OBGYN Provider : TOMEKA Pacheco DO    Obstetrical History:  OB History    Para Term  AB Living   3 2 2 0 0 2   SAB IAB Ectopic Molar Multiple Live Births   0 0 0 0 0 2      # Outcome Date GA Lbr Yuri/2nd Weight Sex Delivery Anes PTL Lv   3 Current            2 Term 17    F Vag-Spont   ROSA      Name: Daya Mark   1 Term 07/02/15    José Miguel Kate Vag-Spont   ROSA      Name: Andrew Lowery     Past Medical History:   Past Medical History:   Diagnosis Date    Abnormal Pap smear of cervix     Anemia affecting pregnancy, antepartum 2022    Auto[facility] disorder ([facility]     Crohn's disease ([facility]     Gestational diabetes 2018    Sjogren's syndrome ([facility]      Medications:  Prior to Admission medications    Medication Sig Start Date End Date Taking?  Authorizing Provider   ferrous sulfate (FE TABS 325) 325 (65 Fe) MG EC tablet Take 1 tablet by mouth daily (with breakfast)  Patient not taking: Reported on 2/15/2022 1/5/22   Diogo Pacheco DO   TRUEplus Lancets 33G MISC USE FOUR TIMES DAILY AS DIRECTED 10/16/21   Wilbert Gonzalez DO   blood glucose test strips (FREESTYLE LITE) strip TEST FIVE TIMES A DAY FASTING, POSTPRANDIAL AND AT BEDTIME PLUS ADDITIONAL TO ACCOMMODATE AS NEEDED TESTING 10/16/21   Wilbert Gonzalez DO   Blood Glucose Monitoring Suppl (FREESTYLE FREEDOM LITE) w/Device KIT 1 kit by Does not apply route 5 times daily Testing 5 times daily, fasting, postprandial and hs. Lancets and strips needed.  10/14/21   Diogo Pacheco DO   ferrous sulfate (IRON 325) 325 (65 Fe) MG tablet Take 1 tablet by mouth daily (with breakfast)  Patient not taking: Reported on 2/15/2022 10/12/21   Patricia Ricci DO   Omega-3 Fatty Acids (FISH OIL PO) Take by mouth daily    Historical Provider, MD   VITAMIN D PO Take by mouth daily    Historical Provider, MD        Allergies:  Amoxicillin and Pcn [penicillins]     Surgical History:  Past Surgical History:   Procedure Laterality Date    COLONOSCOPY      LEEP N/A 1/15/2021    COLPOSCOPY, CERVICAL CONIZATION, ENDOCERVICAL CURRETTINGS performed by Grace Amanda DO at [facility] EXTRACTION  2008     Family History:  Family History   Problem Relation Age of Onset    Diabetes Mother     Breast Cancer Mother     Mastectomy Mother         right    Hypertension Father     No Known Problems Sister     No Known Problems Brother     Arthritis Maternal Grandmother         osteoarthritis     Heart Disease Maternal Grandfather         arrhythimia     Hypertension Maternal Grandfather     Hypertension Paternal Grandmother     Arthritis Paternal Grandmother         \"autoimmune\"     Heart Attack Paternal Grandfather         76s    No Known Problems Brother      Social History:  Social History     Substance and Sexual Activity   Alcohol Use Never     Social History     Substance and Sexual Activity   Drug Use Never     Social History     Tobacco Use   Smoking Status Never Smoker   Smokeless Tobacco Never Used     Physical Exam:  /79   Pulse 64   Temp 98 °F (36.7 °C) (Oral)   Resp 15   Ht 5' 6\" (1.676 m)   Wt 165 lb (74.8 kg)   LMP 07/15/2021   BMI 26.63 kg/m²     General: Alert, well appearing, no acute distress  Head: Normocephalic, atraumatic  Lungs: Clear to auscultation bilaterally without rales, rhonchi, wheezing  CV: Regular rate and regular rhythm, normal S1, S2 without murmurs, rubs, clicks or gallops. Abdomen: Gravid, soft, non-tender, non-distended. No rebound or guarding. No uterine tenderness to palpation  Pelvic: Normal appearing external genitalia without masses, tenderness or lesions. Positive pooling of clear fluid. Positive ferning. Extremities: No redness or tenderness, neg Santana's sign  Skin: Well perfused, normal coloration and turgor, no lesions or rashes visualized  Neuro: Alert, oriented, normal speech, no focal deficits, moves extremities appropriately  Psych: Appropriate, normal affect, appears stated age    Fetal Monitoring Interpretation:     Baseline: 130  Variability: moderate in degree  Accelerations: Present  Decelerations: Absent                       [place]: Contractions are present every 3-5 minutes    Category 1    Reactive: Yes      Labs:  No visits with results within 1 Day(s) from this visit.    Latest known visit with results is:   Routine Prenatal on 03/25/2022   Component Date Value    Group B Strep Culture 03/25/2022 No Group B Beta Strep isolated        Prenatal Labs    First Trimester  - Type/screen: A pos   - Antibody: Negative  - CBC: Hgb 10.0, Plts 136  - Rubella: Non-immune  - RPR: Non-reactive  - Urine ctx: No growth  - HIV: Non-reactive  - Hep B: Non-reactive  - Hep C: Non-reactive  - TSH: 1.32  - Varicella: Immune  - UDS: Negative    - Pap Smear: ASC-H 11/2020, Colpo 12/2020 - HGSIL, LEEP 1/2021 CIN2 with positive margins  - GC/C: Negative/Negative    - Early 1Hr GCT (>35 BMI): 164 - elected to check BS throughout pregnancy    Second Trimester  - AFP (15-19 wks):  - Anatomy Ultrasound: Third Trimester  - Glucose screen: Elevated early 1 hr - has tested throughout pregnancy with BS not consistent with GDM  - CBC: Hgb 9.9, Plts 225     - GBS: Negative (3/25/2022)      Assessment/Plan:     1. Reyes Rico is a 28 y.o.  at 39w6d     2. SROM     - Routine labor care     - Epidural at patient request    3. A pos / RNI / GBS neg    4. Sjogren's syndrome     - + anti SSA and SSB antibodies     - Has had fetal echo - scanned into Media - elevated MCA     - s/p MFM consult 3/14/2022 with normal MCA    5. Anemia during pregnancy    6. S/p cervical CONE    7. Hx of gestational diabetes     - Elevated early 1 hour     - Has been checking BS and has not been consistent with GDM    8. Crohns    9.  Fetal wellbeing     - NST reactive     - Continuous fetal monitoring    Disposition:   In house for labor, delivery and postpartum care    Nettie Caicedo DO

## 2022-04-20 NOTE — LACTATION NOTE
This note was copied from a baby's chart. Lactation Progress Note      Data:   Initial consult on [facility] with an infant born at 39.6 weeks gestation. MOB breast fed her other infants for 12 months without complication. MOB reports breast feeding is going really well and infant latched right on after delivery. Action: Introduced self to patient as Lactation, name and phone number written on white board in room. Reviewed with mother what to expect over the next  24-48 hours with infant feedings, infant output, how to protect milk supply, the importance of a deep latch, how to break suction if latch is painful, what to expect with cluster feeding, and breast care. Educated mother on how to hand express colostrum. Reviewed infant feeding cues and encouraged mother to allow infant to breast feed on demand anytime feeding cues are shown and if no cues are shown to attempt to wake infant and breastfeed every 2-3 hours. If infant is too sleepy to get HUSSEIN, to express colostrum into infants mouth for 10-15 minutes and then try again in another 2-3 hours. After the first day of life to breastfeed a minimum of 8-12 times per 24 hour period. Also encouraged mother to avoid giving infant a pacifier or bottle for at least the first two weeks of life or until breast feeding is well established. Encouraged good nutrition, hydration, and rest. Lanolin provided. Breast feeding log reviewed, all questions answered. Mother instructed to call Lactation for F/U care as needed. Response: MOB and FOB verbalized an understanding of education provided and will call for assistance as needed. MOB confident at this time.

## 2022-04-21 VITALS
SYSTOLIC BLOOD PRESSURE: 125 MMHG | HEIGHT: 66 IN | BODY MASS INDEX: 26.52 KG/M2 | WEIGHT: 165 LBS | RESPIRATION RATE: 18 BRPM | HEART RATE: 77 BPM | TEMPERATURE: 97.9 F | DIASTOLIC BLOOD PRESSURE: 82 MMHG

## 2022-04-21 LAB
HCT VFR BLD CALC: 23.7 % (ref 36–48)
HCT VFR BLD CALC: 25.5 % (ref 36–48)
HEMOGLOBIN: 7.9 G/DL (ref 12–16)
HEMOGLOBIN: 8.5 G/DL (ref 12–16)
MCH RBC QN AUTO: 29.8 PG (ref 26–34)
MCHC RBC AUTO-ENTMCNC: 33.5 G/DL (ref 31–36)
MCV RBC AUTO: 89 FL (ref 80–100)
PDW BLD-RTO: 15.5 % (ref 12.4–15.4)
PLATELET # BLD: 143 K/UL (ref 135–450)
PMV BLD AUTO: 8.9 FL (ref 5–10.5)
RBC # BLD: 2.66 M/UL (ref 4–5.2)
WBC # BLD: 5.7 K/UL (ref 4–11)

## 2022-04-21 PROCEDURE — 85027 COMPLETE CBC AUTOMATED: CPT

## 2022-04-21 PROCEDURE — 36415 COLL VENOUS BLD VENIPUNCTURE: CPT

## 2022-04-21 PROCEDURE — 85014 HEMATOCRIT: CPT

## 2022-04-21 PROCEDURE — 99024 POSTOP FOLLOW-UP VISIT: CPT | Performed by: OBSTETRICS & GYNECOLOGY

## 2022-04-21 PROCEDURE — 6370000000 HC RX 637 (ALT 250 FOR IP): Performed by: OBSTETRICS & GYNECOLOGY

## 2022-04-21 PROCEDURE — 85018 HEMOGLOBIN: CPT

## 2022-04-21 RX ORDER — FERROUS SULFATE 325(65) MG
325 TABLET ORAL
Qty: 30 TABLET | Refills: 3 | Status: SHIPPED | OUTPATIENT
Start: 2022-04-22

## 2022-04-21 RX ORDER — IBUPROFEN 800 MG/1
800 TABLET ORAL EVERY 8 HOURS PRN
Qty: 60 TABLET | Refills: 1 | Status: SHIPPED | OUTPATIENT
Start: 2022-04-21

## 2022-04-21 RX ORDER — DOCUSATE SODIUM 100 MG/1
100 CAPSULE, LIQUID FILLED ORAL 2 TIMES DAILY PRN
Qty: 60 CAPSULE | Refills: 1 | Status: SHIPPED | OUTPATIENT
Start: 2022-04-21

## 2022-04-21 RX ADMIN — DOCUSATE SODIUM 100 MG: 100 CAPSULE, LIQUID FILLED ORAL at 10:28

## 2022-04-21 RX ADMIN — FERROUS SULFATE TAB 325 MG (65 MG ELEMENTAL FE) 325 MG: 325 (65 FE) TAB at 10:28

## 2022-04-21 RX ADMIN — ACETAMINOPHEN 650 MG: 325 TABLET ORAL at 01:56

## 2022-04-21 RX ADMIN — GUAIFENESIN AND DEXTROMETHORPHAN 5 ML: 100; 10 SYRUP ORAL at 06:09

## 2022-04-21 RX ADMIN — ACETAMINOPHEN 650 MG: 325 TABLET ORAL at 10:28

## 2022-04-21 RX ADMIN — MOXIFLOXACIN HYDROCHLORIDE 800 MG: 400 TABLET, FILM COATED ORAL at 04:20

## 2022-04-21 RX ADMIN — GUAIFENESIN AND DEXTROMETHORPHAN 5 ML: 100; 10 SYRUP ORAL at 02:09

## 2022-04-21 RX ADMIN — MOXIFLOXACIN HYDROCHLORIDE 800 MG: 400 TABLET, FILM COATED ORAL at 12:35

## 2022-04-21 ASSESSMENT — PAIN SCALES - GENERAL
PAINLEVEL_OUTOF10: 2

## 2022-04-21 ASSESSMENT — PAIN DESCRIPTION - ORIENTATION
ORIENTATION: MID
ORIENTATION: MID;LOWER

## 2022-04-21 ASSESSMENT — PAIN DESCRIPTION - LOCATION
LOCATION: ABDOMEN
LOCATION: ABDOMEN;PERINEUM

## 2022-04-21 ASSESSMENT — PAIN DESCRIPTION - DESCRIPTORS: DESCRIPTORS: CRAMPING

## 2022-04-21 NOTE — PROGRESS NOTES
Discharge Phone Call    Patient Name: Hansa Bradley     [facility] Care Provider: Erick Gray DO Discharge Date: 2022    Disposition of baby:    Phone Number: [phone] (home)     Attempts to Contact:  Date:    Caller  Date:    Caller  Date:    Caller    Information for the patient's :  [facility] [[id number]]   Delivery Method: Vaginal, Spontaneous       1. Now that you are at home is your pain being well controlled? Y/N   If no, instruct to call       provider. 2. Are you breastfeeding? Y/N    Do you need any extra support from our lactation staff? Y/N    If yes, provide number for lactation. 3. Have you made or already had your first appointment with the baby's doctor? Y/N   If no, do      you know when to schedule it? Y/N    4. Have you scheduled your follow-up appointment? Y/N  If no, do you know when to schedule       it? Y/N   If no, they can find it on printed discharge instructions. 5. Did staff discuss safe sleep during your stay? Y/N   6. Did we explain things in a way you could understand? Y/N  7. Were we respectful of your preferences for labor and birth and include you in the plan of       care? Y/N  If no, please explain _______________________________________________  8. Is there anyone in particular you would like to mention who provided care for you? _______      _________________________________________________________________________     9. Were you given a Post-Birth Warning Signs handout? Y/N  Do you have it somewhere      easily accessible? Y/N  If no, please send them a copy and ask them to put it somewhere      easily found. 10. Have you been crying excessively, having anger or mood swings that feel out of control, or       feel like you can't cope with caring for yourself or baby? Y/N   If yes, they may be showing       signs of postpartum depression and should call provider.  There is also a        depression test on page C5 in their discharge booklet they can take. 13. Do you have any other questions or concerns I can address today?  Y/N  ______________      _________________________________________________________________________    Information provided during call :_________________________________________________  ___________________________________________________________________________    Call completed by:____________________________    Date:_________ Time:___________

## 2022-04-21 NOTE — DISCHARGE SUMMARY
[facility] Ob/Gyn  Obstetric Discharge Summary        Admitting Diagnosis:   1. Samy Covington is a 28 y.o.  at 39w6d with SROM  2. Anemia during pregnancy  3. A pos / RNI / GBS neg  4. Sjogren's syndrome  5. Crohns  6. S/p LEEP    Discharge Diagnosis:  1. Samy Covington is a 28 y.o. [id number] s/p  at 39w6d   2. Acute blood loss anemia  3. A pos / RNI / GBS neg  4. Sjogren's syndrome  5. Crohns    Procedures:   1.     Referrals:    - Lactation Consultant       Information:      - Delivery date/time: 2022 at 1019     - Gender: Female     - Weight 6lbs 9.1oz     - Apgars: 8 & 9      - Feeding: Breast     Discharge Instructions:  Please call for increased pain not controlled by pain medications, significant vaginal bleeding greater than 1 pad/hour, temperature greater than 101 degrees F or any other concerns. Plan to follow up in 2 weeks. Diet:common adult    Activity:  Increase activity gradually. No heavy lifting or driving for Numbers; 2 weeks. Pelvic rest for 6 weeks. No intercourse, tampons, douching, baths.        Medications:   - Motrin    - Iron / Colace     Disposition: Home    Condition on discharge: Stable    Follow up: in 2 week(s)    Sushant Rouse DO

## 2022-04-21 NOTE — LACTATION NOTE
This note was copied from a baby's chart. Lactation Progress Note      Data:   F/U on multip breast feeder who is hoping to be d/c home today. Mob states that baby has been feeding well. Baby currently at breast but latch looks somewhat shallow. Output and wt loss WNL. Action: Few tips given for deeper latch. Observed HUSSEIN with SRS and AS. Discharge teaching done; what to expect in the first few days of life, to feed baby at first sign of hunger cue for total of 8-12 times per day after the first DOL, how to properly position and latch baby, how to know baby is getting enough, engorgement prevention and treatment, avoiding bottles and pacifiers, and community resources. Encouraged to call [de-identified] or Outpatient [facility] for f/u prn. Response: Verbalized and demonstrated understanding. Comfortable with breast feeding at this time.

## 2022-04-21 NOTE — PROGRESS NOTES
[facility] Ob/Gyn  Post Partum Progress Note    Subjective:   Becky Arevalo is a 28 y.o. [id number] s/p  at 39w6d, PPD #1. Patient is doing well today without complaints. Reports lochia is decreasing. Pain is well controlled. Tolerating regular diet without nausea or vomiting. Ambulating without difficulty, denies dizziness on standing. Voiding without difficulty. Denies headache, vision changes, RUQ pain. Denies chest pain, shortness of breath, fever, chills, calf pain. Breast feeding is going well. Desires discharge home today if possible. Postpartum Depression: Low Risk     Last EPDS Total Score: 0    Last EPDS Self Harm Result: Never       Objective:   Vitals:    22   BP: 129/70   Pulse: 80   Resp: 18   Temp: 98.2 °F (36.8 °C)      GENERAL APPEARANCE: alert, well appearing, in no apparent distress  LUNGS: clear to auscultation, no wheezes, rales or rhonchi, symmetric air entry  HEART: regular rate and rhythm, no murmurs  ABDOMEN POSTPARTUM: Soft, non-tender, non-distended. No rebound or guarding. Fundus firm and non-tender below umbilicus. EXTREMITIES: no redness or tenderness in the calves or thighs, no edema  SKIN: normal coloration and turgor, no rashes  NEUROLOGIC: alert, oriented, normal speech, no focal findings or movement disorder noted    Impression: S/P Vaginal Delivery    Pertinent Labs:   Hgb 7.9    Assessment / Plan:  Becky Arevalo is a 28 y.o. [id number] s/p  at 39w6d     1. PPD #1     - Doing well without complaints     - Meeting milestones     - Continue routine postpartum care    2. Acute blood loss anemia     - Hgb 7.9, starting 10.5     - Asymptomatic     - VSS     - Reviewed with patient      - Repeat H/H today - if stable and continues to be asymptomatic will plan for discharge home with iron and colace. 3. A pos / RNI / GBS neg    4. Sjogren's syndrome    5. Crohns    6. Breastfeeding     - Lactation support as needed     7.  Infant information     - Delivery date/time: 04/20/2022 at 0952     - Gender: Female     - Weight 6lbs 9.1oz     - Apgars: 8 & 9     Disposition:   Stable for discharge home  Discharge instructions reviewed    Follow Up in the office in 2 weeks   Given Rx for Ibuprofen for pain   Given Rx for Iron and colace for anemia.     Vin Stains, DO

## 2022-05-19 ENCOUNTER — POSTPARTUM VISIT (OUTPATIENT)
Dept: OBGYN CLINIC | Age: 32
End: 2022-05-19

## 2022-05-19 VITALS
HEART RATE: 80 BPM | WEIGHT: 143.2 LBS | SYSTOLIC BLOOD PRESSURE: 108 MMHG | TEMPERATURE: 97.3 F | BODY MASS INDEX: 23.11 KG/M2 | DIASTOLIC BLOOD PRESSURE: 72 MMHG

## 2022-05-19 DIAGNOSIS — K50.919 CROHN'S DISEASE WITH COMPLICATION, UNSPECIFIED GASTROINTESTINAL TRACT LOCATION (HCC): ICD-10-CM

## 2022-05-19 DIAGNOSIS — Z86.32 HISTORY OF GESTATIONAL DIABETES: ICD-10-CM

## 2022-05-19 DIAGNOSIS — N87.1 DYSPLASIA OF CERVIX, HIGH GRADE CIN 2: ICD-10-CM

## 2022-05-19 DIAGNOSIS — Z98.890 S/P CONIZATION OF CERVIX: ICD-10-CM

## 2022-05-19 DIAGNOSIS — M35.00 SJOGREN'S SYNDROME, WITH UNSPECIFIED ORGAN INVOLVEMENT (HCC): ICD-10-CM

## 2022-05-19 PROCEDURE — 0503F POSTPARTUM CARE VISIT: CPT | Performed by: OBSTETRICS & GYNECOLOGY

## 2022-05-19 RX ORDER — M-VIT,TX,IRON,MINS/CALC/FOLIC 27MG-0.4MG
1 TABLET ORAL DAILY
COMMUNITY

## 2022-06-18 PROBLEM — O99.019 ANEMIA AFFECTING PREGNANCY, ANTEPARTUM: Status: RESOLVED | Noted: 2022-02-27 | Resolved: 2022-06-18

## 2022-06-18 PROBLEM — Z34.83 PRENATAL CARE, SUBSEQUENT PREGNANCY IN THIRD TRIMESTER: Status: RESOLVED | Noted: 2021-11-13 | Resolved: 2022-06-18

## 2022-06-18 PROBLEM — Z37.9 NORMAL LABOR: Status: RESOLVED | Noted: 2022-04-20 | Resolved: 2022-06-18

## 2022-06-18 ASSESSMENT — ENCOUNTER SYMPTOMS
VOMITING: 0
SHORTNESS OF BREATH: 0
NAUSEA: 0
CONSTIPATION: 0
RESPIRATORY NEGATIVE: 1
GASTROINTESTINAL NEGATIVE: 1
ABDOMINAL PAIN: 0
DIARRHEA: 0

## 2022-06-18 NOTE — PROGRESS NOTES
Subjective:      Patient ID: Jared Hammond is a 33 y.o. female. HPI  28 y.o.  female presents for postpartum visit. Patient is 2 weeks s/p  at 39w6d   Patient is doing well without complaints. Lochia subsided. Bleeding resumed and is felt to be first menses---heavy, no concerns. Sutures have fallen out. Denies pain. Admits to headaches. Denies vision changes, RUQ pain. Denies fever, chills, chest pain, shortness of breath, nausea, vomiting, diarrhea, constipation, and calf pain. Breast feeding is going well. Denies signs and symptoms of depression. Declines contraception--birth control makes her crazy. History is positive for cervical conization secondary to HGSIL. Review of Systems   Constitutional: Negative. Negative for activity change, appetite change, chills, fatigue, fever and unexpected weight change. Eyes: Negative for visual disturbance. Respiratory: Negative. Negative for shortness of breath. Cardiovascular: Negative. Negative for chest pain. Gastrointestinal: Negative. Negative for abdominal pain, constipation, diarrhea, nausea and vomiting. Endocrine: Negative for cold intolerance and heat intolerance. Genitourinary: Positive for vaginal bleeding. Negative for difficulty urinating, dysuria, hematuria, pelvic pain, vaginal discharge and vaginal pain. Skin: Negative. Neurological: Positive for headaches. Hematological: Does not bruise/bleed easily. Psychiatric/Behavioral: Negative. Negative for dysphoric mood and suicidal ideas. The patient is not nervous/anxious. Objective:   Physical Exam  Vitals and nursing note reviewed. Constitutional:       General: She is not in acute distress. Appearance: Normal appearance. She is well-developed. She is not ill-appearing, toxic-appearing or diaphoretic. Pulmonary:      Effort: Pulmonary effort is normal.   Skin:     General: Skin is warm and dry.    Neurological:      Mental Status: She is alert and oriented to person, place, and time. Psychiatric:         Mood and Affect: Mood normal.         Behavior: Behavior normal.         Thought Content: Thought content normal.         Judgment: Judgment normal.         Assessment:       Diagnosis Orders   1. Postpartum care following vaginal delivery     2. Sjogren's syndrome, with unspecified organ involvement ([facility]     3. History of gestational diabetes     4. S/P conization of cervix     5. Crohn's disease with complication, unspecified gastrointestinal tract location ([facility]     6. Lactating mother     9. Dysplasia of cervix, high grade ANTONIA 2             Plan:       Options for contraception reviewed--risks and benefits discussed. Pregnancy spacing reviewed. Follow up prn and 4 weeks for postpartum visit. Follow up in 3 months for pap smear.            Angela Pacheco,

## 2022-09-28 ENCOUNTER — OFFICE VISIT (OUTPATIENT)
Dept: OBGYN CLINIC | Age: 32
End: 2022-09-28
Payer: OTHER GOVERNMENT

## 2022-09-28 VITALS
DIASTOLIC BLOOD PRESSURE: 64 MMHG | SYSTOLIC BLOOD PRESSURE: 100 MMHG | HEART RATE: 79 BPM | TEMPERATURE: 97.6 F | BODY MASS INDEX: 21.21 KG/M2 | WEIGHT: 131.4 LBS

## 2022-09-28 DIAGNOSIS — R87.610 PAP SMEAR ABNORMALITY OF CERVIX WITH ASCUS FAVORING DYSPLASIA: ICD-10-CM

## 2022-09-28 DIAGNOSIS — Z01.419 WOMEN'S ANNUAL ROUTINE GYNECOLOGICAL EXAMINATION: Primary | ICD-10-CM

## 2022-09-28 DIAGNOSIS — M35.00 SJOGREN'S SYNDROME, WITH UNSPECIFIED ORGAN INVOLVEMENT (HCC): ICD-10-CM

## 2022-09-28 DIAGNOSIS — Z86.32 HISTORY OF GESTATIONAL DIABETES: ICD-10-CM

## 2022-09-28 DIAGNOSIS — R53.83 FATIGUE, UNSPECIFIED TYPE: ICD-10-CM

## 2022-09-28 DIAGNOSIS — N92.6 IRREGULAR MENSES: ICD-10-CM

## 2022-09-28 DIAGNOSIS — D64.9 ANEMIA, UNSPECIFIED TYPE: ICD-10-CM

## 2022-09-28 DIAGNOSIS — Z98.890 S/P CONIZATION OF CERVIX: ICD-10-CM

## 2022-09-28 DIAGNOSIS — K50.919 CROHN'S DISEASE WITH COMPLICATION, UNSPECIFIED GASTROINTESTINAL TRACT LOCATION (HCC): ICD-10-CM

## 2022-09-28 DIAGNOSIS — Z12.4 PAPANICOLAOU SMEAR FOR CERVICAL CANCER SCREENING: ICD-10-CM

## 2022-09-28 PROCEDURE — 36415 COLL VENOUS BLD VENIPUNCTURE: CPT | Performed by: OBSTETRICS & GYNECOLOGY

## 2022-09-28 PROCEDURE — 99395 PREV VISIT EST AGE 18-39: CPT | Performed by: OBSTETRICS & GYNECOLOGY

## 2022-09-29 LAB
BASOPHILS ABSOLUTE: 0 K/UL (ref 0–0.2)
BASOPHILS RELATIVE PERCENT: 1 %
EOSINOPHILS ABSOLUTE: 0.1 K/UL (ref 0–0.6)
EOSINOPHILS RELATIVE PERCENT: 1.9 %
ESTIMATED AVERAGE GLUCOSE: 91.1 MG/DL
HBA1C MFR BLD: 4.8 %
HCT VFR BLD CALC: 33.8 % (ref 36–48)
HEMOGLOBIN: 11.4 G/DL (ref 12–16)
LYMPHOCYTES ABSOLUTE: 0.8 K/UL (ref 1–5.1)
LYMPHOCYTES RELATIVE PERCENT: 30.2 %
MCH RBC QN AUTO: 31.3 PG (ref 26–34)
MCHC RBC AUTO-ENTMCNC: 33.8 G/DL (ref 31–36)
MCV RBC AUTO: 92.6 FL (ref 80–100)
MONOCYTES ABSOLUTE: 0.3 K/UL (ref 0–1.3)
MONOCYTES RELATIVE PERCENT: 11.2 %
NEUTROPHILS ABSOLUTE: 1.5 K/UL (ref 1.7–7.7)
NEUTROPHILS RELATIVE PERCENT: 55.7 %
PDW BLD-RTO: 13.1 % (ref 12.4–15.4)
PLATELET # BLD: 198 K/UL (ref 135–450)
PMV BLD AUTO: 9.1 FL (ref 5–10.5)
RBC # BLD: 3.65 M/UL (ref 4–5.2)
WBC # BLD: 2.7 K/UL (ref 4–11)

## 2022-10-01 PROBLEM — N92.6 IRREGULAR MENSES: Status: ACTIVE | Noted: 2022-10-01

## 2022-10-01 ASSESSMENT — ENCOUNTER SYMPTOMS
SHORTNESS OF BREATH: 0
ABDOMINAL DISTENTION: 0
CONSTIPATION: 0
NAUSEA: 0
VOMITING: 0
ABDOMINAL PAIN: 0
DIARRHEA: 0

## 2022-10-01 NOTE — PROGRESS NOTES
Subjective:      Patient ID: Isamar Stokes is a 33 y.o. female. HPI  34 y/o  female presents for well woman examination. Last pap smear was 20--ASCUS findings with positive HPV. Subsequent colposcopy on 20 indicated HGSIL/CIN2 prompting cervical conization on 1/15/21--HGSIL/CIN2 with positive endocervical and ectocervical margins. 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 Since conization patient successfully completed 3rd pregnancy. Paitent is 5 months s/p  at 39 weeks 6 days gestation. Pregnancy was complicated by GDM in 2nd pregnancy, Crohns, Sjogrens, anemia, history of conization. Menarche occurred age 16. Menses returned 1-2 months ago. Bleeding occurs every 2 weeks, normal flow, 4 pads per day, heavy cramping. Declines contraception. History is positive for left ovarian cyst.  Denies known history of pelvic infection, uterine fibroid and endometriosis. Review of Systems   Constitutional:  Negative for activity change, appetite change, chills, fatigue, fever and unexpected weight change. Respiratory:  Negative for shortness of breath. Cardiovascular:  Negative for chest pain. Gastrointestinal:  Negative for abdominal distention, abdominal pain, constipation, diarrhea, nausea and vomiting. Endocrine: Negative for cold intolerance and heat intolerance. Genitourinary:  Positive for menstrual problem. Negative for difficulty urinating, dyspareunia, dysuria, frequency, genital sores, hematuria, pelvic pain, vaginal bleeding, vaginal discharge and vaginal pain. Skin:  Negative for rash. Neurological:  Negative for headaches. Hematological:  Does not bruise/bleed easily. Objective:   Physical Exam  Vitals and nursing note reviewed. Exam conducted with a chaperone present. Constitutional:       General: She is not in acute distress.      Appearance: Normal appearance. She is well-developed. She is not ill-appearing or toxic-appearing. HENT:      Head: Normocephalic and atraumatic. Neck:      Thyroid: No thyromegaly. Trachea: Trachea normal.   Cardiovascular:      Rate and Rhythm: Normal rate and regular rhythm. Heart sounds: Normal heart sounds, S1 normal and S2 normal.   Pulmonary:      Effort: Pulmonary effort is normal. No respiratory distress. Breath sounds: Normal breath sounds. Chest:   Breasts:     Breasts are symmetrical.      Right: No inverted nipple, mass, nipple discharge, skin change or tenderness. Left: No inverted nipple, mass, nipple discharge, skin change or tenderness. Abdominal:      General: Bowel sounds are normal. There is no distension. Palpations: Abdomen is soft. Abdomen is not rigid. There is no mass. Tenderness: There is no abdominal tenderness. There is no guarding or rebound. Genitourinary:     General: Normal vulva. Exam position: Lithotomy position. Labia:         Right: No rash, tenderness, lesion or injury. Left: No rash, tenderness, lesion or injury. Vagina: No signs of injury. No vaginal discharge, erythema, tenderness or bleeding. Cervix: No cervical motion tenderness, discharge or friability. Uterus: Not tender. Adnexa:         Right: No mass, tenderness or fullness. Left: No mass, tenderness or fullness. Rectum: Normal.   Musculoskeletal:         General: Normal range of motion. Cervical back: Neck supple. Skin:     General: Skin is warm and dry. Findings: No erythema or rash. Nails: There is no clubbing. Neurological:      Mental Status: She is alert and oriented to person, place, and time. Psychiatric:         Mood and Affect: Mood normal.         Speech: Speech normal.         Behavior: Behavior normal. Behavior is cooperative. Thought Content:  Thought content normal.         Judgment: Judgment normal. Assessment:       Diagnosis Orders   1. Women's annual routine gynecological examination  PAP SMEAR      2. Papanicolaou smear for cervical cancer screening  PAP SMEAR      3. Pap smear abnormality of cervix with ASCUS favoring dysplasia  PAP SMEAR      4. S/P conization of cervix  PAP SMEAR      5. Fatigue, unspecified type  Hemoglobin A1C    CBC with Auto Differential      6. Anemia, unspecified type  Hemoglobin A1C    CBC with Auto Differential      7. Crohn's disease with complication, unspecified gastrointestinal tract location ([facility]        8. History of gestational diabetes        9. Sjogren's syndrome, with unspecified organ involvement ([facility]        10. Irregular menses                Plan:      Orders Placed This Encounter   Procedures    Hemoglobin A1C    CBC with Auto Differential    PAP SMEAR    Human papillomavirus (HPV) DNA probe thin prep high risk     Await test results  Menstrual diary  Options for menstrual irregularity reviewed.    Consider ultrasound if menses continue to be irregular  Follow up delfin Colon DO

## 2022-11-11 ENCOUNTER — OFFICE VISIT (OUTPATIENT)
Dept: OBGYN CLINIC | Age: 32
End: 2022-11-11
Payer: OTHER GOVERNMENT

## 2022-11-11 ENCOUNTER — TELEPHONE (OUTPATIENT)
Dept: OBGYN CLINIC | Age: 32
End: 2022-11-11

## 2022-11-11 VITALS
TEMPERATURE: 98.1 F | BODY MASS INDEX: 20.72 KG/M2 | HEART RATE: 81 BPM | DIASTOLIC BLOOD PRESSURE: 72 MMHG | SYSTOLIC BLOOD PRESSURE: 104 MMHG | WEIGHT: 128.4 LBS

## 2022-11-11 DIAGNOSIS — Z98.890 S/P CONIZATION OF CERVIX: ICD-10-CM

## 2022-11-11 DIAGNOSIS — R87.612 LGSIL ON PAP SMEAR OF CERVIX: Primary | ICD-10-CM

## 2022-11-11 DIAGNOSIS — M35.00 SJOGREN'S SYNDROME, WITH UNSPECIFIED ORGAN INVOLVEMENT (HCC): ICD-10-CM

## 2022-11-11 PROCEDURE — 57454 BX/CURETT OF CERVIX W/SCOPE: CPT | Performed by: OBSTETRICS & GYNECOLOGY

## 2022-11-11 NOTE — TELEPHONE ENCOUNTER
Patient stated delivery was not covered by "[employer] as Dr. Zee Tucker delivered. Checking with credentialing to see why Dr. Zee Tucker would not be covered.

## 2022-11-14 NOTE — PROGRESS NOTES
Subjective:      Patient ID: Scott Morgan is a 33 y.o. female. HPI  32 y/o  female presents for colposcopy and possible biopsies secondary to abnormal pap smear. Pap smear on 22 indicated LSIL with positive testing for high risk HPV other types. Prior pap smear was 20--ASCUS findings with positive HPV. Subsequent colposcopy on 20 indicated HGSIL/CIN2 prompting cervical conization on 1/15/21--HGSIL/CIN2 with positive endocervical and ectocervical margins. Since conization patient successfully completed 3rd pregnancy. 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 Paitent is 7 months s/p  at 39 weeks 6 days gestation. Pregnancy was complicated by GDM in 2nd pregnancy, Crohns, Sjogrens, anemia, history of conization. Menarche occurred age 16. Menses returned 1-2 months ago. Bleeding occurs every 2 weeks, normal flow, 4 pads per day, heavy cramping. Declines contraception. History is positive for left ovarian cyst.  Denies known history of pelvic infection, uterine fibroid and endometriosis. Review of Systems   Constitutional: Negative. Negative for activity change, appetite change, chills, fatigue, fever and unexpected weight change. Genitourinary:  Negative for difficulty urinating, dysuria, hematuria, pelvic pain, vaginal bleeding, vaginal discharge and vaginal pain. Psychiatric/Behavioral: Negative. Objective:   Physical Exam  Vitals and nursing note reviewed. Exam conducted with a chaperone present. Constitutional:       General: She is not in acute distress. Appearance: Normal appearance. She is well-developed. She is not ill-appearing, toxic-appearing or diaphoretic. Pulmonary:      Effort: Pulmonary effort is normal.   Genitourinary:     General: Normal vulva. Exam position: Lithotomy position. Pubic Area: No rash.        Labia:         Right: No rash, tenderness, lesion or injury. Left: No rash, tenderness, lesion or injury. Vagina: No signs of injury and foreign body. No vaginal discharge, erythema, tenderness, bleeding or lesions. Cervix: Eversion present. No cervical motion tenderness, discharge, friability or erythema. Skin:     General: Skin is warm and dry. Neurological:      Mental Status: She is alert and oriented to person, place, and time. Psychiatric:         Mood and Affect: Mood normal.         Behavior: Behavior normal.         Thought Content: Thought content normal.         Judgment: Judgment normal.     COLPOSCOPY, ECC, CERVICAL BIOPSY  Patient was taken to procedure room. Previous results were discussed--implications and further evaluation were then discussed. Written and informed consent was then obtained for colposcopy as well as possible biopsy/ECC. Risks and benefits understood. Patient was then positioned in dorsal lithotomy position. External genitalia was examined and noted to have normal findings. The speculum was then placed. The cervix and upper vagina were then evaluated. Utilizing the colposcope (with both white and green light), the cervix was then evaluated. The squamocolumnar junction was well visualized and colposcopy was found to be adequate. An ECC was then performed. The cervix and upper vagina were then bathed in acetic acid and colposcopic visualization was then repeated as above. Acetowhite changes were noted at the 7 o'clock and 12 o'clock positions and representative biopsies were performed utilizing Tischler biopsy forceps. The biopsy areas were then touched with silver nitrate for excellent hemostasis. All instruments were then removed. Patient tolerated procedure well. Post procedure instructions were then given and explained to the patient. Assessment:       Diagnosis Orders   1.  LGSIL on Pap smear of cervix  13646 - [place] COLPOSC,CERVIX W/ADJ VAG,W/BX & CURRETAG SURGICAL PATHOLOGY      2. S/P conization of cervix        3. Sjogren's syndrome, with unspecified organ involvement ([facility]        4.  Lactating mother                Plan:      Orders Placed This Encounter   Procedures    SURGICAL PATHOLOGY    35876 - [place] COLPOSC,CERVIX W/ADJ VAG,W/BX & CURRETAG     Await test results  Follow up prn         Galileo Covington DO

## 2023-04-19 ENCOUNTER — OFFICE VISIT (OUTPATIENT)
Dept: OBGYN CLINIC | Age: 33
End: 2023-04-19
Payer: OTHER GOVERNMENT

## 2023-04-19 VITALS
BODY MASS INDEX: 19.61 KG/M2 | DIASTOLIC BLOOD PRESSURE: 58 MMHG | OXYGEN SATURATION: 98 % | HEART RATE: 94 BPM | TEMPERATURE: 97.9 F | HEIGHT: 66 IN | WEIGHT: 122 LBS | SYSTOLIC BLOOD PRESSURE: 110 MMHG

## 2023-04-19 DIAGNOSIS — Z88.0 PENICILLIN ALLERGY: ICD-10-CM

## 2023-04-19 DIAGNOSIS — M35.00 SJOGREN'S SYNDROME, WITH UNSPECIFIED ORGAN INVOLVEMENT (HCC): ICD-10-CM

## 2023-04-19 DIAGNOSIS — N87.0 DYSPLASIA OF CERVIX, LOW GRADE (CIN 1): ICD-10-CM

## 2023-04-19 DIAGNOSIS — K50.919 CROHN'S DISEASE WITH COMPLICATION, UNSPECIFIED GASTROINTESTINAL TRACT LOCATION (HCC): ICD-10-CM

## 2023-04-19 DIAGNOSIS — O09.899 SHORT INTERVAL BETWEEN PREGNANCIES AFFECTING PREGNANCY, ANTEPARTUM: ICD-10-CM

## 2023-04-19 DIAGNOSIS — N92.6 IRREGULAR MENSES: ICD-10-CM

## 2023-04-19 DIAGNOSIS — Z86.32 HISTORY OF GESTATIONAL DIABETES: ICD-10-CM

## 2023-04-19 DIAGNOSIS — Z98.890 S/P CONIZATION OF CERVIX: ICD-10-CM

## 2023-04-19 DIAGNOSIS — N91.2 AMENORRHEA: Primary | ICD-10-CM

## 2023-04-19 DIAGNOSIS — Z11.3 SCREENING FOR STD (SEXUALLY TRANSMITTED DISEASE): ICD-10-CM

## 2023-04-19 LAB
CONTROL: NORMAL
PREGNANCY TEST URINE, POC: POSITIVE

## 2023-04-19 PROCEDURE — 81025 URINE PREGNANCY TEST: CPT | Performed by: OBSTETRICS & GYNECOLOGY

## 2023-04-19 PROCEDURE — 99213 OFFICE O/P EST LOW 20 MIN: CPT | Performed by: OBSTETRICS & GYNECOLOGY

## 2023-04-19 SDOH — ECONOMIC STABILITY: INCOME INSECURITY: HOW HARD IS IT FOR YOU TO PAY FOR THE VERY BASICS LIKE FOOD, HOUSING, MEDICAL CARE, AND HEATING?: NOT HARD AT ALL

## 2023-04-19 SDOH — ECONOMIC STABILITY: FOOD INSECURITY: WITHIN THE PAST 12 MONTHS, THE FOOD YOU BOUGHT JUST DIDN'T LAST AND YOU DIDN'T HAVE MONEY TO GET MORE.: NEVER TRUE

## 2023-04-19 SDOH — ECONOMIC STABILITY: FOOD INSECURITY: WITHIN THE PAST 12 MONTHS, YOU WORRIED THAT YOUR FOOD WOULD RUN OUT BEFORE YOU GOT MONEY TO BUY MORE.: NEVER TRUE

## 2023-04-19 SDOH — ECONOMIC STABILITY: HOUSING INSECURITY
IN THE LAST 12 MONTHS, WAS THERE A TIME WHEN YOU DID NOT HAVE A STEADY PLACE TO SLEEP OR SLEPT IN A SHELTER (INCLUDING NOW)?: NO

## 2023-04-19 ASSESSMENT — PATIENT HEALTH QUESTIONNAIRE - PHQ9
SUM OF ALL RESPONSES TO PHQ QUESTIONS 1-9: 0
2. FEELING DOWN, DEPRESSED OR HOPELESS: 0
SUM OF ALL RESPONSES TO PHQ QUESTIONS 1-9: 0
1. LITTLE INTEREST OR PLEASURE IN DOING THINGS: 0
SUM OF ALL RESPONSES TO PHQ QUESTIONS 1-9: 0
SUM OF ALL RESPONSES TO PHQ9 QUESTIONS 1 & 2: 0
SUM OF ALL RESPONSES TO PHQ QUESTIONS 1-9: 0

## 2023-04-19 ASSESSMENT — ENCOUNTER SYMPTOMS
ABDOMINAL PAIN: 0
SHORTNESS OF BREATH: 0
NAUSEA: 0
VOMITING: 0
ABDOMINAL DISTENTION: 0
DIARRHEA: 0
CONSTIPATION: 0

## 2023-04-20 LAB
BACTERIA URNS QL MICRO: ABNORMAL /HPF
BILIRUB UR QL STRIP.AUTO: NEGATIVE
C TRACH DNA CVX QL NAA+PROBE: NEGATIVE
CANDIDA DNA VAG QL NAA+PROBE: NORMAL
CLARITY UR: CLEAR
COLOR UR: YELLOW
EPI CELLS #/AREA URNS AUTO: 18 /HPF (ref 0–5)
G VAGINALIS DNA SPEC QL NAA+PROBE: NORMAL
GLUCOSE UR STRIP.AUTO-MCNC: NEGATIVE MG/DL
HGB UR QL STRIP.AUTO: NEGATIVE
HYALINE CASTS #/AREA URNS AUTO: 1 /LPF (ref 0–8)
KETONES UR STRIP.AUTO-MCNC: NEGATIVE MG/DL
LEUKOCYTE ESTERASE UR QL STRIP.AUTO: ABNORMAL
N GONORRHOEA DNA CERV MUCUS QL NAA+PROBE: NEGATIVE
NITRITE UR QL STRIP.AUTO: NEGATIVE
PH UR STRIP.AUTO: 6 [PH] (ref 5–8)
PROT UR STRIP.AUTO-MCNC: ABNORMAL MG/DL
RBC CLUMPS #/AREA URNS AUTO: 2 /HPF (ref 0–4)
SP GR UR STRIP.AUTO: 1.02 (ref 1–1.03)
T VAGINALIS DNA VAG QL NAA+PROBE: NORMAL
UA DIPSTICK W REFLEX MICRO PNL UR: YES
URN SPEC COLLECT METH UR: ABNORMAL
UROBILINOGEN UR STRIP-ACNC: 0.2 E.U./DL
WBC #/AREA URNS AUTO: 3 /HPF (ref 0–5)

## 2023-04-20 NOTE — PROGRESS NOTES
Subjective:      Patient ID: Yris Roman is a 33 y.o. female. HPI  34 y/o  female presents for evaluation secondary to missed menses/amenorrhea. Menarche occurred age 16. Menses returned (after delivery) in 2022. Menses usually occur every month, normal flow, 4 pads per day, heavy cramping. FDLMP: 23. Noted bleeding the week of implantation for a couple of days. No vaginal bleeding or discharge since that time. Taking prenatal vitamins. Patient is 12 months s/p  at 39 weeks 6 days gestation. Pregnancy was complicated by GDM in 2nd pregnancy, Crohns, Sjogrens, anemia, history of conization. Last pap smear was 22--LSIL, positive testing for high risk HPV other types. Subsequent colposcopy and biopsies revealed normal ECC and ectocervical biopsies confirming LSIL. Pap smear on 20 revealed ASCUS findings with positive HPV. Subsequent colposcopy on 20 indicated HGSIL/CIN2 prompting cervical conization on 1/15/21--HGSIL/CIN2 with positive endocervical and ectocervical margins. Since conization patient successfully completed 3rd pregnancy. History is positive for abnormal pap smear in [place] (patient was enlisted in the armed forces). Sees [facility] every 4 weeks. Interested in minimal intervention/surveillance/monitoring. Review of Systems   Constitutional:  Negative for activity change, appetite change, chills, fatigue, fever and unexpected weight change. Respiratory:  Negative for shortness of breath. Cardiovascular:  Negative for chest pain. Gastrointestinal:  Negative for abdominal distention, abdominal pain, constipation, diarrhea, nausea and vomiting. Endocrine: Negative for cold intolerance and heat intolerance. Genitourinary:  Positive for menstrual problem. Negative for difficulty urinating, dyspareunia, dysuria, frequency, genital sores, hematuria, pelvic pain, vaginal bleeding, vaginal discharge and vaginal pain.

## 2023-04-21 LAB — BACTERIA UR CULT: NORMAL

## 2023-06-01 RX ORDER — BLOOD-GLUCOSE METER
KIT MISCELLANEOUS
Qty: 100 STRIP | Refills: 0 | OUTPATIENT
Start: 2023-06-01

## 2023-06-12 PROBLEM — Z34.81 PRENATAL CARE, SUBSEQUENT PREGNANCY IN FIRST TRIMESTER: Status: ACTIVE | Noted: 2021-11-13

## 2023-06-21 ENCOUNTER — HOSPITAL ENCOUNTER (OUTPATIENT)
Age: 33
Discharge: HOME | End: 2023-06-21
Payer: OTHER GOVERNMENT

## 2023-06-21 ENCOUNTER — ROUTINE PRENATAL (OUTPATIENT)
Dept: OBGYN CLINIC | Age: 33
End: 2023-06-21

## 2023-06-21 VITALS
SYSTOLIC BLOOD PRESSURE: 120 MMHG | DIASTOLIC BLOOD PRESSURE: 64 MMHG | BODY MASS INDEX: 20.43 KG/M2 | WEIGHT: 126.6 LBS | HEART RATE: 84 BPM

## 2023-06-21 DIAGNOSIS — Z98.890 S/P CONIZATION OF CERVIX: ICD-10-CM

## 2023-06-21 DIAGNOSIS — Z86.32 HISTORY OF GESTATIONAL DIABETES: ICD-10-CM

## 2023-06-21 DIAGNOSIS — Z34.81 PRENATAL CARE, SUBSEQUENT PREGNANCY IN FIRST TRIMESTER: ICD-10-CM

## 2023-06-21 DIAGNOSIS — M35.00 SJOGREN'S SYNDROME, WITH UNSPECIFIED ORGAN INVOLVEMENT (HCC): ICD-10-CM

## 2023-06-21 DIAGNOSIS — K50.919 CROHN'S DISEASE WITH COMPLICATION, UNSPECIFIED GASTROINTESTINAL TRACT LOCATION (HCC): ICD-10-CM

## 2023-06-21 DIAGNOSIS — Z34.81 PRENATAL CARE, SUBSEQUENT PREGNANCY IN FIRST TRIMESTER: Primary | ICD-10-CM

## 2023-06-21 DIAGNOSIS — Z34.82 PRENATAL CARE, SUBSEQUENT PREGNANCY IN SECOND TRIMESTER: ICD-10-CM

## 2023-06-21 DIAGNOSIS — O09.899 SHORT INTERVAL BETWEEN PREGNANCIES AFFECTING PREGNANCY, ANTEPARTUM: ICD-10-CM

## 2023-06-21 DIAGNOSIS — Z88.0 PENICILLIN ALLERGY: ICD-10-CM

## 2023-06-21 DIAGNOSIS — Z86.32 HISTORY OF GESTATIONAL DIABETES MELLITUS (GDM): ICD-10-CM

## 2023-06-21 LAB
BASOPHILS # BLD: 0 K/UL (ref 0–0.2)
BASOPHILS NFR BLD: 0.3 %
DEPRECATED RDW RBC AUTO: 13.8 % (ref 12.4–15.4)
EOSINOPHIL # BLD: 0 K/UL (ref 0–0.6)
EOSINOPHIL NFR BLD: 0.6 %
HCT VFR BLD AUTO: 28.2 % (ref 36–48)
HGB BLD-MCNC: 10.2 G/DL (ref 12–16)
LYMPHOCYTES # BLD: 0.8 K/UL (ref 1–5.1)
LYMPHOCYTES NFR BLD: 18.7 %
MCH RBC QN AUTO: 32.4 PG (ref 26–34)
MCHC RBC AUTO-ENTMCNC: 36.1 G/DL (ref 31–36)
MCV RBC AUTO: 89.7 FL (ref 80–100)
MONOCYTES # BLD: 0.4 K/UL (ref 0–1.3)
MONOCYTES NFR BLD: 8.3 %
NEUTROPHILS # BLD: 3.2 K/UL (ref 1.7–7.7)
NEUTROPHILS NFR BLD: 72.1 %
PLATELET # BLD AUTO: 175 K/UL (ref 135–450)
PMV BLD AUTO: 9.2 FL (ref 5–10.5)
RBC # BLD AUTO: 3.14 M/UL (ref 4–5.2)
WBC # BLD AUTO: 4.5 K/UL (ref 4–11)

## 2023-06-21 PROCEDURE — 83036 HEMOGLOBIN GLYCOSYLATED A1C: CPT

## 2023-06-21 PROCEDURE — 85025 COMPLETE CBC W/AUTO DIFF WBC: CPT

## 2023-06-21 PROCEDURE — 36415 COLL VENOUS BLD VENIPUNCTURE: CPT

## 2023-06-21 PROCEDURE — 86235 NUCLEAR ANTIGEN ANTIBODY: CPT

## 2023-06-22 LAB
ENA SS-A AB SER IA-ACNC: >8 AI (ref 0–0.9)
ENA SS-B AB SER IA-ACNC: >8 AI (ref 0–0.9)
EST. AVERAGE GLUCOSE BLD GHB EST-MCNC: 93.9 MG/DL
HBA1C MFR BLD: 4.9 %

## 2023-07-07 PROBLEM — O09.899 RUBELLA NON-IMMUNE STATUS, ANTEPARTUM: Status: ACTIVE | Noted: 2023-07-07

## 2023-07-07 PROBLEM — O99.019 ANEMIA IN PREGNANCY, UNSPECIFIED TRIMESTER: Status: ACTIVE | Noted: 2023-07-07

## 2023-07-07 PROBLEM — Z28.39 RUBELLA NON-IMMUNE STATUS, ANTEPARTUM: Status: ACTIVE | Noted: 2023-07-07

## 2023-07-17 ENCOUNTER — ROUTINE PRENATAL (OUTPATIENT)
Dept: OBGYN CLINIC | Age: 33
End: 2023-07-17

## 2023-07-17 VITALS
BODY MASS INDEX: 21.34 KG/M2 | SYSTOLIC BLOOD PRESSURE: 110 MMHG | DIASTOLIC BLOOD PRESSURE: 68 MMHG | WEIGHT: 132.2 LBS | HEART RATE: 85 BPM

## 2023-07-17 DIAGNOSIS — O99.019 ANEMIA IN PREGNANCY, UNSPECIFIED TRIMESTER: ICD-10-CM

## 2023-07-17 DIAGNOSIS — Z34.82 PRENATAL CARE, SUBSEQUENT PREGNANCY IN SECOND TRIMESTER: Primary | ICD-10-CM

## 2023-07-17 DIAGNOSIS — K50.919 CROHN'S DISEASE WITH COMPLICATION, UNSPECIFIED GASTROINTESTINAL TRACT LOCATION (HCC): ICD-10-CM

## 2023-07-17 DIAGNOSIS — Z86.32 HISTORY OF GESTATIONAL DIABETES: ICD-10-CM

## 2023-07-17 DIAGNOSIS — Z28.39 RUBELLA NON-IMMUNE STATUS, ANTEPARTUM: ICD-10-CM

## 2023-07-17 DIAGNOSIS — M35.00 SJOGREN'S SYNDROME, WITH UNSPECIFIED ORGAN INVOLVEMENT (HCC): ICD-10-CM

## 2023-07-17 DIAGNOSIS — Z98.890 S/P CONIZATION OF CERVIX: ICD-10-CM

## 2023-07-17 DIAGNOSIS — O09.899 SHORT INTERVAL BETWEEN PREGNANCIES AFFECTING PREGNANCY, ANTEPARTUM: ICD-10-CM

## 2023-07-17 DIAGNOSIS — Z88.0 PENICILLIN ALLERGY: ICD-10-CM

## 2023-07-17 DIAGNOSIS — O09.899 RUBELLA NON-IMMUNE STATUS, ANTEPARTUM: ICD-10-CM

## 2023-07-17 PROCEDURE — 0502F SUBSEQUENT PRENATAL CARE: CPT | Performed by: OBSTETRICS & GYNECOLOGY

## 2023-07-17 NOTE — PROGRESS NOTES
Maternal emotional well being screening form completed and reviewed with patient. Current score is 0. Patient given referral to [address] [place] [address] ([phone]):  No

## 2023-07-24 NOTE — PROGRESS NOTES
28 y/o  female at 20 weeks 3 days gestation with [facility] 23 presents for prenatal visit and ultrasound  Pregnancy is complicated by Sjogren's (SSA and SSB positive), anemia, history of conization, short interval pregnancy, GDM in 2nd pregnancy, and Crohns  FDLMP: 23. Noted bleeding the week of implantation for a couple of days. No vaginal bleeding or discharge since that time. Taking prenatal vitamins. Patient is 15 months s/p  at 39 weeks 6 days gestation. Last pap smear was 22--LSIL, positive testing for high risk HPV other types. Subsequent colposcopy and biopsies revealed normal ECC and ectocervical biopsies confirming LSIL. Pap smear on 20 revealed ASCUS findings with positive HPV. Subsequent colposcopy on 20 indicated HGSIL/CIN2 prompting cervical conization on 1/15/21--HGSIL/CIN2 with positive endocervical and ectocervical margins. Since conization patient successfully completed 3rd pregnancy. 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 Sees [facility] every 4 weeks. Interested in minimal intervention/surveillance and monitoring during pregnancy. Understands implications in the presence of Sjogrens, etc. Risks and benefits reviewed. MFM recommendations from previous pregnancy reviewed as they remain pertinent to current pregnancy. Monitoring every 2 weeks--Sjogrens  Patient declines referral to MFM for level 2, cervical length and fetal monitoring every 2 weeks. Has opted for glucose monitoring in lieu of 1 hour GTT, etc.   Glucose levels stable  Any elevated glucose has been attributed to diet and timing of meal--has made adjustments. Denies vaginal bleeding, loss of fluid, and pelvic pain. Positive fetal movement   Denies headache, vision changes and RUQ pain. Admits to constipation.   Denies fever, chills, chest pain, shortness of breath, nausea, vomiting, diarrhea, dysuria, and

## 2023-08-18 ENCOUNTER — ROUTINE PRENATAL (OUTPATIENT)
Dept: OBGYN CLINIC | Age: 33
End: 2023-08-18

## 2023-08-18 VITALS
BODY MASS INDEX: 22.66 KG/M2 | SYSTOLIC BLOOD PRESSURE: 110 MMHG | HEART RATE: 78 BPM | DIASTOLIC BLOOD PRESSURE: 62 MMHG | WEIGHT: 140.4 LBS

## 2023-08-18 DIAGNOSIS — K50.919 CROHN'S DISEASE WITH COMPLICATION, UNSPECIFIED GASTROINTESTINAL TRACT LOCATION (HCC): ICD-10-CM

## 2023-08-18 DIAGNOSIS — O09.899 RUBELLA NON-IMMUNE STATUS, ANTEPARTUM: ICD-10-CM

## 2023-08-18 DIAGNOSIS — O09.899 SHORT INTERVAL BETWEEN PREGNANCIES AFFECTING PREGNANCY, ANTEPARTUM: ICD-10-CM

## 2023-08-18 DIAGNOSIS — Z28.39 RUBELLA NON-IMMUNE STATUS, ANTEPARTUM: ICD-10-CM

## 2023-08-18 DIAGNOSIS — Z98.890 S/P CONIZATION OF CERVIX: ICD-10-CM

## 2023-08-18 DIAGNOSIS — M35.00 SJOGREN'S SYNDROME, WITH UNSPECIFIED ORGAN INVOLVEMENT (HCC): ICD-10-CM

## 2023-08-18 DIAGNOSIS — O99.019 ANEMIA IN PREGNANCY, UNSPECIFIED TRIMESTER: ICD-10-CM

## 2023-08-18 DIAGNOSIS — Z88.0 PENICILLIN ALLERGY: ICD-10-CM

## 2023-08-18 DIAGNOSIS — Z34.82 PRENATAL CARE, SUBSEQUENT PREGNANCY IN SECOND TRIMESTER: Primary | ICD-10-CM

## 2023-08-18 DIAGNOSIS — N93.0 POSTCOITAL AND CONTACT BLEEDING: ICD-10-CM

## 2023-08-18 DIAGNOSIS — Z86.32 HISTORY OF GESTATIONAL DIABETES: ICD-10-CM

## 2023-09-18 ENCOUNTER — HOSPITAL ENCOUNTER (OUTPATIENT)
Age: 33
Discharge: HOME | End: 2023-09-18
Payer: OTHER GOVERNMENT

## 2023-09-18 ENCOUNTER — ROUTINE PRENATAL (OUTPATIENT)
Dept: OBGYN CLINIC | Age: 33
End: 2023-09-18

## 2023-09-18 VITALS
WEIGHT: 147.8 LBS | DIASTOLIC BLOOD PRESSURE: 68 MMHG | HEART RATE: 81 BPM | BODY MASS INDEX: 23.75 KG/M2 | SYSTOLIC BLOOD PRESSURE: 100 MMHG | TEMPERATURE: 97.2 F | HEIGHT: 66 IN

## 2023-09-18 DIAGNOSIS — Z28.39 RUBELLA NON-IMMUNE STATUS, ANTEPARTUM: ICD-10-CM

## 2023-09-18 DIAGNOSIS — O99.019 ANEMIA IN PREGNANCY, UNSPECIFIED TRIMESTER: ICD-10-CM

## 2023-09-18 DIAGNOSIS — M35.00 SJOGREN'S SYNDROME, WITH UNSPECIFIED ORGAN INVOLVEMENT (HCC): Primary | ICD-10-CM

## 2023-09-18 DIAGNOSIS — Z88.0 PENICILLIN ALLERGY: ICD-10-CM

## 2023-09-18 DIAGNOSIS — Z34.83 PRENATAL CARE, SUBSEQUENT PREGNANCY IN THIRD TRIMESTER: ICD-10-CM

## 2023-09-18 DIAGNOSIS — K50.919 CROHN'S DISEASE WITH COMPLICATION, UNSPECIFIED GASTROINTESTINAL TRACT LOCATION (HCC): ICD-10-CM

## 2023-09-18 DIAGNOSIS — O09.899 SHORT INTERVAL BETWEEN PREGNANCIES AFFECTING PREGNANCY, ANTEPARTUM: ICD-10-CM

## 2023-09-18 DIAGNOSIS — Z34.82 PRENATAL CARE, SUBSEQUENT PREGNANCY IN SECOND TRIMESTER: ICD-10-CM

## 2023-09-18 DIAGNOSIS — Z86.32 HISTORY OF GESTATIONAL DIABETES: ICD-10-CM

## 2023-09-18 DIAGNOSIS — Z98.890 S/P CONIZATION OF CERVIX: ICD-10-CM

## 2023-09-18 DIAGNOSIS — M35.00 SJOGREN'S SYNDROME, WITH UNSPECIFIED ORGAN INVOLVEMENT (HCC): ICD-10-CM

## 2023-09-18 DIAGNOSIS — O09.899 RUBELLA NON-IMMUNE STATUS, ANTEPARTUM: ICD-10-CM

## 2023-09-18 LAB
BASOPHILS # BLD: 0 K/UL (ref 0–0.2)
BASOPHILS NFR BLD: 0.4 %
CA-I BLD-SCNC: 1.17 MMOL/L (ref 1.12–1.32)
DEPRECATED RDW RBC AUTO: 12.3 % (ref 12.4–15.4)
EOSINOPHIL # BLD: 0 K/UL (ref 0–0.6)
EOSINOPHIL NFR BLD: 0.5 %
HCT VFR BLD AUTO: 27.9 % (ref 36–48)
HGB BLD-MCNC: 9.8 G/DL (ref 12–16)
LYMPHOCYTES # BLD: 0.6 K/UL (ref 1–5.1)
LYMPHOCYTES NFR BLD: 8.7 %
MAGNESIUM SERPL-MCNC: 2.1 MG/DL (ref 1.8–2.4)
MCH RBC QN AUTO: 31.1 PG (ref 26–34)
MCHC RBC AUTO-ENTMCNC: 35.2 G/DL (ref 31–36)
MCV RBC AUTO: 88.2 FL (ref 80–100)
MONOCYTES # BLD: 0.5 K/UL (ref 0–1.3)
MONOCYTES NFR BLD: 7.1 %
NEUTROPHILS # BLD: 5.4 K/UL (ref 1.7–7.7)
NEUTROPHILS NFR BLD: 83.3 %
PH BLDV: 7.34 [PH] (ref 7.35–7.45)
PLATELET # BLD AUTO: 202 K/UL (ref 135–450)
PMV BLD AUTO: 8.3 FL (ref 5–10.5)
RBC # BLD AUTO: 3.17 M/UL (ref 4–5.2)
WBC # BLD AUTO: 6.5 K/UL (ref 4–11)

## 2023-09-18 PROCEDURE — 83735 ASSAY OF MAGNESIUM: CPT

## 2023-09-18 PROCEDURE — 36415 COLL VENOUS BLD VENIPUNCTURE: CPT

## 2023-09-18 PROCEDURE — 82330 ASSAY OF CALCIUM: CPT

## 2023-09-18 PROCEDURE — 0502F SUBSEQUENT PRENATAL CARE: CPT | Performed by: OBSTETRICS & GYNECOLOGY

## 2023-09-18 PROCEDURE — 85025 COMPLETE CBC W/AUTO DIFF WBC: CPT

## 2023-09-18 PROCEDURE — 83036 HEMOGLOBIN GLYCOSYLATED A1C: CPT

## 2023-09-19 LAB
EST. AVERAGE GLUCOSE BLD GHB EST-MCNC: 93.9 MG/DL
HBA1C MFR BLD: 4.9 %

## 2023-09-21 NOTE — PROGRESS NOTES
visualized due to the size of the uterus and patient's gravid state. Anatomy seen includes: heart, stomach, kidneys, bladder     IMPRESSION:  Single live IUP in the third trimester. Adequate interval fetal growth. Imaging is limited secondary to fetal position. The patient is well aware of the limitations of ultrasound in the detection of anomalies. Diagnosis Orders   1. Sjogren's syndrome, with unspecified organ involvement ([address])  CBC with Auto Differential    Magnesium    Calcium, Ionized      2. Prenatal care, subsequent pregnancy in third trimester  CBC with Auto Differential    Hemoglobin A1C    Magnesium    Calcium, Ionized      3. Crohn's disease with complication, unspecified gastrointestinal tract location ([address])        4. S/P conization of cervix        5. History of gestational diabetes        6. Prenatal care, subsequent pregnancy in second trimester        7. Penicillin allergy        8. Short interval between pregnancies affecting pregnancy, antepartum        9. Rubella non-immune status, antepartum        10. Anemia in pregnancy, unspecified trimester          Ultrasound result reviewed  Continue prenatal vitamins  Continue glucose monitoring  Continue dietary interventions  Follow up prn and 2 weeks for prenatal visit.

## 2023-10-04 ENCOUNTER — ROUTINE PRENATAL (OUTPATIENT)
Dept: OBGYN CLINIC | Age: 33
End: 2023-10-04

## 2023-10-04 VITALS
WEIGHT: 151.4 LBS | SYSTOLIC BLOOD PRESSURE: 100 MMHG | DIASTOLIC BLOOD PRESSURE: 62 MMHG | HEART RATE: 84 BPM | BODY MASS INDEX: 24.44 KG/M2

## 2023-10-04 DIAGNOSIS — O99.019 ANEMIA IN PREGNANCY, UNSPECIFIED TRIMESTER: ICD-10-CM

## 2023-10-04 DIAGNOSIS — Z34.83 PRENATAL CARE, SUBSEQUENT PREGNANCY IN THIRD TRIMESTER: Primary | ICD-10-CM

## 2023-10-04 DIAGNOSIS — Z86.32 HISTORY OF GESTATIONAL DIABETES: ICD-10-CM

## 2023-10-04 DIAGNOSIS — Z28.39 RUBELLA NON-IMMUNE STATUS, ANTEPARTUM: ICD-10-CM

## 2023-10-04 DIAGNOSIS — Z88.0 PENICILLIN ALLERGY: ICD-10-CM

## 2023-10-04 DIAGNOSIS — K50.919 CROHN'S DISEASE WITH COMPLICATION, UNSPECIFIED GASTROINTESTINAL TRACT LOCATION (HCC): ICD-10-CM

## 2023-10-04 DIAGNOSIS — O09.899 SHORT INTERVAL BETWEEN PREGNANCIES AFFECTING PREGNANCY, ANTEPARTUM: ICD-10-CM

## 2023-10-04 DIAGNOSIS — O09.899 RUBELLA NON-IMMUNE STATUS, ANTEPARTUM: ICD-10-CM

## 2023-10-04 DIAGNOSIS — M35.00 SJOGREN'S SYNDROME, WITH UNSPECIFIED ORGAN INVOLVEMENT (HCC): ICD-10-CM

## 2023-10-04 DIAGNOSIS — Z98.890 S/P CONIZATION OF CERVIX: ICD-10-CM

## 2023-10-04 PROCEDURE — 0502F SUBSEQUENT PRENATAL CARE: CPT | Performed by: OBSTETRICS & GYNECOLOGY

## 2023-10-04 NOTE — PROGRESS NOTES
28 y/o  female at 31 weeks 5 days gestation with [facility] 23 presents for prenatal visit  Pregnancy is complicated by Sjogren's (SSA and SSB positive), anemia, history of conization, short interval pregnancy, GDM in 2nd pregnancy, and Crohns  FDLMP: 23. Noted bleeding the week of implantation for a couple of days. No vaginal bleeding or discharge since that time. Taking prenatal vitamins. Patient is 18 months s/p  at 39 weeks 6 days gestation. Last pap smear was 22--LSIL, positive testing for high risk HPV other types. Subsequent colposcopy and biopsies revealed normal ECC and ectocervical biopsies confirming LSIL. Pap smear on 20 revealed ASCUS findings with positive HPV. Subsequent colposcopy on 20 indicated HGSIL/CIN2 prompting cervical conization on 1/15/21--HGSIL/CIN2 with positive endocervical and ectocervical margins. Since conization patient successfully completed 3rd pregnancy. 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 Sees [facility] every 4 weeks--has appointment Friday (every 4-6 weeks). Interested in minimal intervention/surveillance and monitoring during pregnancy. Understands implications in the presence of Sjogrens, etc. Risks and benefits reviewed. M recommendations from previous pregnancy reviewed as they remain pertinent to current pregnancy. Monitoring every 2 weeks--Sjogrens  Patient declines referral to [facility] for level 2, cervical length and fetal monitoring/ultrasound every 2 weeks. Recommendations reviewed again today. Has opted for glucose monitoring in lieu of 1 hour GTT, etc.   Glucose levels stable  Fastin's  1 hour postprandial: < 120's  Any elevated glucose has been attributed to diet and timing of meal--has made adjustments. Denies vaginal bleeding, loss of fluid, and pelvic pain. Positive fetal movement   Denies headache, vision changes and RUQ pain.

## 2023-10-04 NOTE — PROGRESS NOTES
Maternal emotional well being screening form completed and reviewed with patient. Current score is 0. Patient given referral to [address] [place] [address] ([phone]):  No

## 2023-10-16 ENCOUNTER — ROUTINE PRENATAL (OUTPATIENT)
Dept: OBGYN CLINIC | Age: 33
End: 2023-10-16

## 2023-10-16 VITALS
SYSTOLIC BLOOD PRESSURE: 110 MMHG | TEMPERATURE: 97.3 F | DIASTOLIC BLOOD PRESSURE: 74 MMHG | BODY MASS INDEX: 24.76 KG/M2 | HEART RATE: 88 BPM | WEIGHT: 153.4 LBS

## 2023-10-16 DIAGNOSIS — Z28.39 RUBELLA NON-IMMUNE STATUS, ANTEPARTUM: ICD-10-CM

## 2023-10-16 DIAGNOSIS — O09.899 SHORT INTERVAL BETWEEN PREGNANCIES AFFECTING PREGNANCY, ANTEPARTUM: ICD-10-CM

## 2023-10-16 DIAGNOSIS — M35.00 SJOGREN'S SYNDROME, WITH UNSPECIFIED ORGAN INVOLVEMENT (HCC): ICD-10-CM

## 2023-10-16 DIAGNOSIS — Z86.32 HISTORY OF GESTATIONAL DIABETES: ICD-10-CM

## 2023-10-16 DIAGNOSIS — Z88.0 PENICILLIN ALLERGY: ICD-10-CM

## 2023-10-16 DIAGNOSIS — O99.019 ANEMIA IN PREGNANCY, UNSPECIFIED TRIMESTER: ICD-10-CM

## 2023-10-16 DIAGNOSIS — O09.899 RUBELLA NON-IMMUNE STATUS, ANTEPARTUM: ICD-10-CM

## 2023-10-16 DIAGNOSIS — K50.919 CROHN'S DISEASE WITH COMPLICATION, UNSPECIFIED GASTROINTESTINAL TRACT LOCATION (HCC): ICD-10-CM

## 2023-10-16 DIAGNOSIS — Z34.83 PRENATAL CARE, SUBSEQUENT PREGNANCY IN THIRD TRIMESTER: Primary | ICD-10-CM

## 2023-10-16 PROCEDURE — 0502F SUBSEQUENT PRENATAL CARE: CPT | Performed by: OBSTETRICS & GYNECOLOGY

## 2023-10-16 NOTE — PROGRESS NOTES
Maternal emotional well being screening form completed and reviewed with patient.  Current score is 0
FM, VB, LOF precautions reviewed     - Precautions reviewed     2. Short interval between pregnancies affecting pregnancy, antepartum     - 18 months s/p  at 39w6d    3. History of gestational diabetes     - Desires BS testing in lieu of 1 hr GTT     - Glucose levels stable - reports fasting 70-80, 1 hr PP < 120     - Continue glucose monitoring     4. Crohn's disease with complication, unspecified gastrointestinal tract location ([address])     - No complaints today     5. Sjogren's syndrome, with unspecified organ involvement ([address])     - SSA and SSB positive     - MFM recommendations from previous pregnancy - monitoring every 2 weeks    6. Penicillin allergy    7. Rubella non-immune status, antepartum    8. Anemia in pregnancy, unspecified trimester    9.  S/p LEEP     - 2020 ASCUS positive HR HPV     - Colposcopy with HGSIL/CIN2     - Cervical cone  HGSIL/ANTONIA with positive endocervical and ectocervical margins     - Since conization patient successfully completed 3rd pregnancy     - Follow-up Pap smear LSIL, positive HR HPV and follow-up Colposcopy showed CIN1    Sagrario Estrada DO

## 2023-11-02 ENCOUNTER — ROUTINE PRENATAL (OUTPATIENT)
Dept: OBGYN CLINIC | Age: 33
End: 2023-11-02

## 2023-11-02 VITALS
WEIGHT: 158 LBS | SYSTOLIC BLOOD PRESSURE: 101 MMHG | OXYGEN SATURATION: 99 % | BODY MASS INDEX: 25.5 KG/M2 | HEART RATE: 80 BPM | DIASTOLIC BLOOD PRESSURE: 75 MMHG | TEMPERATURE: 98 F

## 2023-11-02 DIAGNOSIS — Z86.32 HISTORY OF GESTATIONAL DIABETES: ICD-10-CM

## 2023-11-02 DIAGNOSIS — Z34.83 PRENATAL CARE, SUBSEQUENT PREGNANCY IN THIRD TRIMESTER: Primary | ICD-10-CM

## 2023-11-02 DIAGNOSIS — Z88.0 PENICILLIN ALLERGY: ICD-10-CM

## 2023-11-02 DIAGNOSIS — O09.899 SHORT INTERVAL BETWEEN PREGNANCIES AFFECTING PREGNANCY, ANTEPARTUM: ICD-10-CM

## 2023-11-02 DIAGNOSIS — K50.919 CROHN'S DISEASE WITH COMPLICATION, UNSPECIFIED GASTROINTESTINAL TRACT LOCATION (HCC): ICD-10-CM

## 2023-11-02 DIAGNOSIS — M35.00 SJOGREN'S SYNDROME, WITH UNSPECIFIED ORGAN INVOLVEMENT (HCC): ICD-10-CM

## 2023-11-02 PROCEDURE — 0502F SUBSEQUENT PRENATAL CARE: CPT | Performed by: OBSTETRICS & GYNECOLOGY

## 2023-11-02 NOTE — PROGRESS NOTES
Maternal emotional well being screening form completed and reviewed with patient.  Current score is 1
appearing anatomy. The uterus is unremarkable/gravid. Maternal ovaries and adnexae are not well visualized due to the size of the uterus and patient's gravid state. Anatomy seen includes: heart, stomach, kidneys, bladder     IMPRESSION:  Single live IUP in the third trimester. Adequate interval fetal growth. BPP 8/8. Imaging is limited secondary to fetal position. The patient is well aware of the limitations of ultrasound in the detection of anomalies. Assessment/Plan:   Diagnosis Orders   1. Prenatal care, subsequent pregnancy in third trimester  Culture, GBS with Penicillin Allergy      2. Short interval between pregnancies affecting pregnancy, antepartum        3. History of gestational diabetes        4. Sjogren's syndrome, with unspecified organ involvement ([address])        5. Crohn's disease with complication, unspecified gastrointestinal tract location ([address])        6. Penicillin allergy  Culture, GBS with Penicillin Allergy          Diagnosis Orders   1. Prenatal care, subsequent pregnancy in third trimester  Culture, GBS with Penicillin Allergy   - GBS culture today with results to follow   - Growth adequate today    2. Short interval between pregnancies affecting pregnancy, antepartum        3. History of gestational diabetes        4. Sjogren's syndrome, with unspecified organ involvement ([address])     - BBP reassuring today   - Previously declined referral to M    5. Crohn's disease with complication, unspecified gastrointestinal tract location ([address])        6. Penicillin allergy  Culture, GBS with Penicillin Allergy        - reassuring maternal / fetal status     Follow Up  Return OB precautions reviewed   No follow-ups on file. Approximately 15 minutes spent in room counseling patient on condition and coordination of care with over 50% in direct face to face counseling.      Tamara Lias, DO

## 2023-11-05 LAB
GP B STREP SPEC QL CULT: ABNORMAL
ORGANISM: ABNORMAL

## 2023-11-07 ENCOUNTER — HOSPITAL ENCOUNTER (OUTPATIENT)
Age: 33
Discharge: HOME | End: 2023-11-07
Attending: OBSTETRICS & GYNECOLOGY | Admitting: OBSTETRICS & GYNECOLOGY
Payer: OTHER GOVERNMENT

## 2023-11-07 VITALS
WEIGHT: 155 LBS | HEIGHT: 66 IN | BODY MASS INDEX: 24.91 KG/M2 | SYSTOLIC BLOOD PRESSURE: 120 MMHG | DIASTOLIC BLOOD PRESSURE: 67 MMHG | HEART RATE: 82 BPM | TEMPERATURE: 98.7 F | RESPIRATION RATE: 16 BRPM

## 2023-11-07 LAB
GP B STREP SPEC QL CULT: ABNORMAL
ORGANISM: ABNORMAL

## 2023-11-07 PROCEDURE — 99211 OFF/OP EST MAY X REQ PHY/QHP: CPT

## 2023-11-07 NOTE — PROGRESS NOTES
Patient arrived ambulatory to [facility] with c/o possible ROM 11/06/2023 at 1500. Patient states she felt a large leakage of fluid yesterday and has since felt some \"wetness. \"  Patient states she has felt contractions off and on since then but has not timed them. Reports mild pink discharge on toilet paper. Denies decreased fetal movement. Patient assisted to triage room 2, bed in low position and locked, call light in reach. EFMs placed. Abdomen soft to palpate. Denies any complication with current pregnancy. Dr. Cailin Darby called and notified of patient's arrival.   Codey CHANG called to evaluate.

## 2023-11-07 NOTE — PROGRESS NOTES
Discharge instructions given in written form and verbally. Denies any questions. Pt ambulated off unit in stable condition, undelivered and with significant other.

## 2023-11-07 NOTE — PROGRESS NOTES
Dr. Amos Aggarwal called. Notified of SVE and intact membranes. Patient desires to go home. Discharge orders received.

## 2023-11-07 NOTE — H&P
[facility] and Delivery Triage Note        CHIEF COMPLAINT:  possible leaking of fluid    HISTORY OF PRESENT ILLNESS:      The patient is a 29 y.o. female 40w2d. OB History          4    Para   3    Term   3            AB        Living   3         SAB        IAB        Ectopic        Molar        Multiple   0    Live Births   3              Patient presents complaining of possible leaking of fluid, occ contractions. She reports Normal fetal movement. She denies vaginal bleeding. She reports leakage of amniotic fluid     Estimated Due Date:  Estimated Date of Delivery: 23    PAST MEDICAL HISTORY:   Past Medical History:   Diagnosis Date    Abnormal Pap smear of cervix     Anemia affecting pregnancy, antepartum 2022    Autoimmune disorder ([address]     Crohn's disease ([facility])     Gestational diabetes 2018    Sjogren's syndrome ([facility])        PAST  SURGICAL HISTORY:   Past Surgical History:   Procedure Laterality Date    COLONOSCOPY      LEEP N/A 1/15/2021    COLPOSCOPY, CERVICAL CONIZATION, ENDOCERVICAL CURRETTINGS performed by Kevyn Wesley DO at [address] [place]         SOCIAL HISTORY:     reports that she has never smoked. She has never used smokeless tobacco. She reports that she does not drink alcohol and does not use drugs. MEDICATIONS:    Prior to Admission medications    Medication Sig Start Date End Date Taking? Authorizing Provider   Prenatal MV-Min-Fe Fum-FA-DHA (PRENATAL 1 PO) Take by mouth    Provider, MD Anjel   Lancets MISC 1 each by Does not apply route in the morning and at bedtime 23   Mavis Pacheco, DO   blood glucose monitor strips Test 2 times a day & as needed for symptoms of irregular blood glucose. Dispense sufficient amount for indicated testing frequency plus additional to accommodate PRN testing needs.  23   Mavis Pacheco DO   ferrous sulfate (IRON 325) 325 (65 Fe) MG tablet Take 1

## 2023-11-08 ENCOUNTER — ROUTINE PRENATAL (OUTPATIENT)
Dept: OBGYN CLINIC | Age: 33
End: 2023-11-08

## 2023-11-08 VITALS
DIASTOLIC BLOOD PRESSURE: 62 MMHG | SYSTOLIC BLOOD PRESSURE: 110 MMHG | BODY MASS INDEX: 25.44 KG/M2 | WEIGHT: 157.6 LBS | HEART RATE: 99 BPM

## 2023-11-08 DIAGNOSIS — Z28.39 RUBELLA NON-IMMUNE STATUS, ANTEPARTUM: ICD-10-CM

## 2023-11-08 DIAGNOSIS — O09.899 SHORT INTERVAL BETWEEN PREGNANCIES AFFECTING PREGNANCY, ANTEPARTUM: ICD-10-CM

## 2023-11-08 DIAGNOSIS — Z34.83 PRENATAL CARE, SUBSEQUENT PREGNANCY IN THIRD TRIMESTER: Primary | ICD-10-CM

## 2023-11-08 DIAGNOSIS — K50.919 CROHN'S DISEASE WITH COMPLICATION, UNSPECIFIED GASTROINTESTINAL TRACT LOCATION (HCC): ICD-10-CM

## 2023-11-08 DIAGNOSIS — Z98.890 S/P CONIZATION OF CERVIX: ICD-10-CM

## 2023-11-08 DIAGNOSIS — O99.820 GBS (GROUP B STREPTOCOCCUS CARRIER), +RV CULTURE, CURRENTLY PREGNANT: ICD-10-CM

## 2023-11-08 DIAGNOSIS — O09.899 RUBELLA NON-IMMUNE STATUS, ANTEPARTUM: ICD-10-CM

## 2023-11-08 DIAGNOSIS — Z88.0 PENICILLIN ALLERGY: ICD-10-CM

## 2023-11-08 DIAGNOSIS — M35.00 SJOGREN'S SYNDROME, WITH UNSPECIFIED ORGAN INVOLVEMENT (HCC): ICD-10-CM

## 2023-11-08 DIAGNOSIS — O99.019 ANEMIA IN PREGNANCY, UNSPECIFIED TRIMESTER: ICD-10-CM

## 2023-11-08 DIAGNOSIS — Z86.32 HISTORY OF GESTATIONAL DIABETES: ICD-10-CM

## 2023-11-08 NOTE — PROGRESS NOTES
Maternal emotional well being screening form completed and reviewed with patient. Current score is 0. Patient given referral to [address] [place] [address] ([phone]):  No

## 2023-11-11 PROBLEM — O99.820 GBS (GROUP B STREPTOCOCCUS CARRIER), +RV CULTURE, CURRENTLY PREGNANT: Status: ACTIVE | Noted: 2023-11-11

## 2023-11-14 ENCOUNTER — ROUTINE PRENATAL (OUTPATIENT)
Dept: OBGYN CLINIC | Age: 33
End: 2023-11-14

## 2023-11-14 VITALS
BODY MASS INDEX: 25.7 KG/M2 | WEIGHT: 159.2 LBS | DIASTOLIC BLOOD PRESSURE: 72 MMHG | HEART RATE: 89 BPM | SYSTOLIC BLOOD PRESSURE: 106 MMHG

## 2023-11-14 DIAGNOSIS — K50.919 CROHN'S DISEASE WITH COMPLICATION, UNSPECIFIED GASTROINTESTINAL TRACT LOCATION (HCC): ICD-10-CM

## 2023-11-14 DIAGNOSIS — Z98.890 S/P CONIZATION OF CERVIX: ICD-10-CM

## 2023-11-14 DIAGNOSIS — O99.019 ANEMIA IN PREGNANCY, UNSPECIFIED TRIMESTER: ICD-10-CM

## 2023-11-14 DIAGNOSIS — Z86.32 HISTORY OF GESTATIONAL DIABETES: ICD-10-CM

## 2023-11-14 DIAGNOSIS — Z34.83 PRENATAL CARE, SUBSEQUENT PREGNANCY IN THIRD TRIMESTER: Primary | ICD-10-CM

## 2023-11-14 DIAGNOSIS — O09.899 RUBELLA NON-IMMUNE STATUS, ANTEPARTUM: ICD-10-CM

## 2023-11-14 DIAGNOSIS — O99.820 GBS (GROUP B STREPTOCOCCUS CARRIER), +RV CULTURE, CURRENTLY PREGNANT: ICD-10-CM

## 2023-11-14 DIAGNOSIS — O09.899 SHORT INTERVAL BETWEEN PREGNANCIES AFFECTING PREGNANCY, ANTEPARTUM: ICD-10-CM

## 2023-11-14 DIAGNOSIS — Z28.39 RUBELLA NON-IMMUNE STATUS, ANTEPARTUM: ICD-10-CM

## 2023-11-14 DIAGNOSIS — Z88.0 PENICILLIN ALLERGY: ICD-10-CM

## 2023-11-14 DIAGNOSIS — M35.00 SJOGREN'S SYNDROME, WITH UNSPECIFIED ORGAN INVOLVEMENT (HCC): ICD-10-CM

## 2023-11-14 NOTE — PROGRESS NOTES
Maternal emotional well being screening form completed and reviewed with patient. Current score is 0. Patient given referral to [address] [place] [address] ([phone]):  No

## 2023-11-22 ENCOUNTER — ROUTINE PRENATAL (OUTPATIENT)
Dept: OBGYN CLINIC | Age: 33
End: 2023-11-22

## 2023-11-22 VITALS
WEIGHT: 159 LBS | BODY MASS INDEX: 25.66 KG/M2 | SYSTOLIC BLOOD PRESSURE: 116 MMHG | DIASTOLIC BLOOD PRESSURE: 74 MMHG | HEART RATE: 87 BPM

## 2023-11-22 DIAGNOSIS — M35.00 SJOGREN'S SYNDROME, WITH UNSPECIFIED ORGAN INVOLVEMENT (HCC): ICD-10-CM

## 2023-11-22 DIAGNOSIS — K50.919 CROHN'S DISEASE WITH COMPLICATION, UNSPECIFIED GASTROINTESTINAL TRACT LOCATION (HCC): ICD-10-CM

## 2023-11-22 DIAGNOSIS — Z86.32 HISTORY OF GESTATIONAL DIABETES: ICD-10-CM

## 2023-11-22 DIAGNOSIS — Z88.0 PENICILLIN ALLERGY: ICD-10-CM

## 2023-11-22 DIAGNOSIS — Z98.890 S/P CONIZATION OF CERVIX: ICD-10-CM

## 2023-11-22 DIAGNOSIS — O26.893 VAGINAL DISCHARGE DURING PREGNANCY IN THIRD TRIMESTER: ICD-10-CM

## 2023-11-22 DIAGNOSIS — O99.019 ANEMIA IN PREGNANCY, UNSPECIFIED TRIMESTER: ICD-10-CM

## 2023-11-22 DIAGNOSIS — N89.8 VAGINAL DISCHARGE DURING PREGNANCY IN THIRD TRIMESTER: ICD-10-CM

## 2023-11-22 DIAGNOSIS — O09.899 RUBELLA NON-IMMUNE STATUS, ANTEPARTUM: ICD-10-CM

## 2023-11-22 DIAGNOSIS — Z28.39 RUBELLA NON-IMMUNE STATUS, ANTEPARTUM: ICD-10-CM

## 2023-11-22 DIAGNOSIS — O09.899 SHORT INTERVAL BETWEEN PREGNANCIES AFFECTING PREGNANCY, ANTEPARTUM: ICD-10-CM

## 2023-11-22 DIAGNOSIS — O99.820 GBS (GROUP B STREPTOCOCCUS CARRIER), +RV CULTURE, CURRENTLY PREGNANT: ICD-10-CM

## 2023-11-22 DIAGNOSIS — Z34.83 PRENATAL CARE, SUBSEQUENT PREGNANCY IN THIRD TRIMESTER: Primary | ICD-10-CM

## 2023-11-23 LAB
CANDIDA DNA VAG QL NAA+PROBE: ABNORMAL
G VAGINALIS DNA SPEC QL NAA+PROBE: ABNORMAL
T VAGINALIS DNA VAG QL NAA+PROBE: ABNORMAL

## 2023-11-29 ENCOUNTER — HOSPITAL ENCOUNTER (OUTPATIENT)
Age: 33
Discharge: HOME | End: 2023-11-29
Attending: OBSTETRICS & GYNECOLOGY | Admitting: OBSTETRICS & GYNECOLOGY
Payer: OTHER GOVERNMENT

## 2023-11-29 VITALS
HEIGHT: 66 IN | DIASTOLIC BLOOD PRESSURE: 73 MMHG | HEART RATE: 82 BPM | WEIGHT: 159 LBS | TEMPERATURE: 98.2 F | RESPIRATION RATE: 16 BRPM | SYSTOLIC BLOOD PRESSURE: 112 MMHG | BODY MASS INDEX: 25.55 KG/M2

## 2023-11-29 PROCEDURE — 99211 OFF/OP EST MAY X REQ PHY/QHP: CPT

## 2023-11-29 NOTE — H&P
Department of Obstetrics and Gynecology   Obstetrics History and Physical        CHIEF COMPLAINT:  leakage of amniotic fluid    HISTORY OF PRESENT ILLNESS:      The patient is a 29 y.o. female at 38w8d.   OB History          4    Para   3    Term   3            AB        Living   3         SAB        IAB        Ectopic        Molar        Multiple   0    Live Births   3            Patient presents with a chief complaint as above and is being admitted for observation    Estimated Due Date: Estimated Date of Delivery: 23    PRENATAL CARE:    Complicated by: none    PAST OB HISTORY:  OB History          4    Para   3    Term   3            AB        Living   3         SAB        IAB        Ectopic        Molar        Multiple   0    Live Births   3                Past Medical History:        Diagnosis Date    Abnormal Pap smear of cervix     Anemia affecting pregnancy, antepartum 2022    Autoimmune disorder ([address])     Crohn's disease ([address])     Gestational diabetes 2018    Sjogren's syndrome ([address])      Past Surgical History:        Procedure Laterality Date    COLONOSCOPY      LEEP N/A 1/15/2021    COLPOSCOPY, CERVICAL CONIZATION, ENDOCERVICAL CURRETTINGS performed by Kevyn Wesley DO at [address] [place]       Allergies:  Amoxicillin and Pcn [penicillins]    Social History:    Social History     Socioeconomic History    Marital status:      Spouse name: Not on file    Number of children: 2    Years of education: Not on file    Highest education level: Not on file   Occupational History    Not on file   Tobacco Use    Smoking status: Never    Smokeless tobacco: Never   Vaping Use    Vaping Use: Never used   Substance and Sexual Activity    Alcohol use: Never    Drug use: Never    Sexual activity: Yes     Partners: Male     Birth control/protection: Condom     Comment:    Other Topics Concern    Not on file   Social History Narrative

## 2023-11-29 NOTE — PROGRESS NOTES
Pt presents to [facility] triage with c/o contractions since last night, consistent over night and this morning every 5 minutes. Mild to palpation. When asked patient about any LOF, she states in previous pregnancies she's had \"high leaks\" and hadn't necessarily noticed right away and that she had felt something last night, which may have been urine, but nothing since. Called Dr. Asiya Sanders. SBAR given. Orders received to have house MD evaluate for ROM.

## 2023-11-29 NOTE — PROGRESS NOTES
Discharge instructions given in written form and verbally. Denies any questions. Pt ambulated off unit in stable condition, undelivered and with significant other. Pt not in active labor.

## 2023-11-29 NOTE — PROGRESS NOTES
While Dr. Tigist Vega at bedside, pt states she was dx with having BV and a yeast infection last week and had not been treated just yet. Dr. Tigist Vega collected [place] and did SILVERIO Munoz negative per Dr. Tigist Vega.

## 2023-11-29 NOTE — DISCHARGE INSTRUCTIONS
Labor Check: After Your Visit   Your Care Instructions   If you pass your due date and your labor does not start on its own, your doctor may want to try to start (induce) labor. Your doctor may suggest inducing labor for other reasons. It may be a good idea to induce labor if you have another medical condition, such as high blood pressure, or if the placenta can no longer give enough support to the baby. Your doctor may take several steps to get your labor going. Your doctor will check to see if the opening to the womb (cervix) is ready and if your baby is low in your pelvis. The cervix is ready for active labor when it is soft and begins to open. If it is not ready, your doctor may use medicine to soften it. Your doctor may \"break your water\" (rupture the amniotic sac) if your cervix is soft and has begun to open but active labor does not start. To break your sac, your doctor will insert a slim instrument into your vagina. He or she will pull gently on the sac until it breaks. You probably will not feel any pain from the sac breaking. But you may feel and hear a large gush of fluid. If you do not start active labor after your sac breaks, your doctor probably will give you medicine such as oxytocin (Pitocin) to start labor and keep your labor going. Follow-up care is a key part of your treatment and safety. Be sure to make and go to all appointments, and call your doctor if you are having problems. It's also a good idea to know your test results and keep a list of the medicines you take. When should your labor be induced? Your pregnancy has gone 1 to 2 weeks past your expected due date. You have a medical condition, such as high blood pressure, preeclampsia, or diabetes, that may harm your health or the health of your baby if you continue to be pregnant. Your water breaks, but labor does not start. When should you call for help? Call 911 anytime you think you may need emergency care.

## 2023-11-29 NOTE — PROGRESS NOTES
Called Dr. Jm Mota. Notified her of SVE unchanged at 2/60/-2, Elihue Lard negative, and irregular ctxs. Pt concerned about going past due date and has an appt with Dr. Bironna Lord tomorrow. Dr. Jm Mota states to offer her an induction appt for Friday morning to hold a spot for her. She can discuss it with Dr. Brionna Lord tomorrow and alter that appointment if needed. Pt agreeable to this plan. Pt put on books for IOL Friday, 12/1/23 @0700.

## 2023-11-30 ENCOUNTER — ROUTINE PRENATAL (OUTPATIENT)
Dept: OBGYN CLINIC | Age: 33
End: 2023-11-30

## 2023-11-30 VITALS
SYSTOLIC BLOOD PRESSURE: 112 MMHG | HEART RATE: 80 BPM | WEIGHT: 161.2 LBS | DIASTOLIC BLOOD PRESSURE: 70 MMHG | TEMPERATURE: 98.1 F | BODY MASS INDEX: 26.02 KG/M2

## 2023-11-30 DIAGNOSIS — Z28.39 RUBELLA NON-IMMUNE STATUS, ANTEPARTUM: ICD-10-CM

## 2023-11-30 DIAGNOSIS — M35.00 SJOGREN'S SYNDROME, WITH UNSPECIFIED ORGAN INVOLVEMENT (HCC): ICD-10-CM

## 2023-11-30 DIAGNOSIS — Z88.0 PENICILLIN ALLERGY: ICD-10-CM

## 2023-11-30 DIAGNOSIS — K50.919 CROHN'S DISEASE WITH COMPLICATION, UNSPECIFIED GASTROINTESTINAL TRACT LOCATION (HCC): ICD-10-CM

## 2023-11-30 DIAGNOSIS — O09.899 RUBELLA NON-IMMUNE STATUS, ANTEPARTUM: ICD-10-CM

## 2023-11-30 DIAGNOSIS — O09.899 SHORT INTERVAL BETWEEN PREGNANCIES AFFECTING PREGNANCY, ANTEPARTUM: ICD-10-CM

## 2023-11-30 DIAGNOSIS — O99.820 GBS (GROUP B STREPTOCOCCUS CARRIER), +RV CULTURE, CURRENTLY PREGNANT: ICD-10-CM

## 2023-11-30 DIAGNOSIS — O99.019 ANEMIA IN PREGNANCY, UNSPECIFIED TRIMESTER: ICD-10-CM

## 2023-11-30 DIAGNOSIS — Z86.32 HISTORY OF GESTATIONAL DIABETES: ICD-10-CM

## 2023-11-30 DIAGNOSIS — Z34.83 PRENATAL CARE, SUBSEQUENT PREGNANCY IN THIRD TRIMESTER: Primary | ICD-10-CM

## 2023-11-30 DIAGNOSIS — Z98.890 S/P CONIZATION OF CERVIX: ICD-10-CM

## 2023-12-04 ENCOUNTER — ROUTINE PRENATAL (OUTPATIENT)
Dept: OBGYN CLINIC | Age: 33
End: 2023-12-04

## 2023-12-04 VITALS
DIASTOLIC BLOOD PRESSURE: 70 MMHG | SYSTOLIC BLOOD PRESSURE: 106 MMHG | TEMPERATURE: 98.4 F | HEART RATE: 89 BPM | BODY MASS INDEX: 26.28 KG/M2 | WEIGHT: 162.8 LBS

## 2023-12-04 DIAGNOSIS — Z88.0 PENICILLIN ALLERGY: ICD-10-CM

## 2023-12-04 DIAGNOSIS — Z98.890 S/P CONIZATION OF CERVIX: ICD-10-CM

## 2023-12-04 DIAGNOSIS — K50.919 CROHN'S DISEASE WITH COMPLICATION, UNSPECIFIED GASTROINTESTINAL TRACT LOCATION (HCC): ICD-10-CM

## 2023-12-04 DIAGNOSIS — M35.00 SJOGREN'S SYNDROME, WITH UNSPECIFIED ORGAN INVOLVEMENT (HCC): ICD-10-CM

## 2023-12-04 DIAGNOSIS — O09.899 SHORT INTERVAL BETWEEN PREGNANCIES AFFECTING PREGNANCY, ANTEPARTUM: ICD-10-CM

## 2023-12-04 DIAGNOSIS — Z86.32 HISTORY OF GESTATIONAL DIABETES: ICD-10-CM

## 2023-12-04 DIAGNOSIS — Z34.83 PRENATAL CARE, SUBSEQUENT PREGNANCY IN THIRD TRIMESTER: Primary | ICD-10-CM

## 2023-12-04 DIAGNOSIS — Z28.39 RUBELLA NON-IMMUNE STATUS, ANTEPARTUM: ICD-10-CM

## 2023-12-04 DIAGNOSIS — O99.820 GBS (GROUP B STREPTOCOCCUS CARRIER), +RV CULTURE, CURRENTLY PREGNANT: ICD-10-CM

## 2023-12-04 DIAGNOSIS — O99.019 ANEMIA IN PREGNANCY, UNSPECIFIED TRIMESTER: ICD-10-CM

## 2023-12-04 DIAGNOSIS — O09.899 RUBELLA NON-IMMUNE STATUS, ANTEPARTUM: ICD-10-CM

## 2023-12-05 ENCOUNTER — HOSPITAL ENCOUNTER (INPATIENT)
Age: 33
LOS: 2 days | Discharge: HOME | End: 2023-12-07
Attending: OBSTETRICS & GYNECOLOGY | Admitting: OBSTETRICS & GYNECOLOGY
Payer: OTHER GOVERNMENT

## 2023-12-05 ENCOUNTER — APPOINTMENT (OUTPATIENT)
Dept: LABOR AND DELIVERY | Age: 33
End: 2023-12-05
Payer: OTHER GOVERNMENT

## 2023-12-05 PROBLEM — Z34.90 ENCOUNTER FOR INDUCTION OF LABOR: Status: ACTIVE | Noted: 2023-12-05

## 2023-12-05 LAB
ABO + RH BLD: NORMAL
AMPHETAMINES UR QL SCN>1000 NG/ML: NORMAL
BARBITURATES UR QL SCN>200 NG/ML: NORMAL
BASOPHILS # BLD: 0 K/UL (ref 0–0.2)
BASOPHILS NFR BLD: 0.3 %
BENZODIAZ UR QL SCN>200 NG/ML: NORMAL
BLD GP AB SCN SERPL QL: NORMAL
BUPRENORPHINE+NOR UR QL SCN: NORMAL
CANNABINOIDS UR QL SCN>50 NG/ML: NORMAL
COCAINE UR QL SCN: NORMAL
DEPRECATED RDW RBC AUTO: 16.7 % (ref 12.4–15.4)
DRUG SCREEN COMMENT UR-IMP: NORMAL
EOSINOPHIL # BLD: 0 K/UL (ref 0–0.6)
EOSINOPHIL NFR BLD: 0.6 %
FENTANYL SCREEN, URINE: NORMAL
HCT VFR BLD AUTO: 32.2 % (ref 36–48)
HGB BLD-MCNC: 11 G/DL (ref 12–16)
LYMPHOCYTES # BLD: 0.8 K/UL (ref 1–5.1)
LYMPHOCYTES NFR BLD: 10.3 %
MCH RBC QN AUTO: 31 PG (ref 26–34)
MCHC RBC AUTO-ENTMCNC: 34.3 G/DL (ref 31–36)
MCV RBC AUTO: 90.4 FL (ref 80–100)
METHADONE UR QL SCN>300 NG/ML: NORMAL
MONOCYTES # BLD: 0.6 K/UL (ref 0–1.3)
MONOCYTES NFR BLD: 8 %
NEUTROPHILS # BLD: 6.2 K/UL (ref 1.7–7.7)
NEUTROPHILS NFR BLD: 80.8 %
OPIATES UR QL SCN>300 NG/ML: NORMAL
OXYCODONE UR QL SCN: NORMAL
PCP UR QL SCN>25 NG/ML: NORMAL
PH UR STRIP: 6 [PH]
PLATELET # BLD AUTO: 183 K/UL (ref 135–450)
PMV BLD AUTO: 8.5 FL (ref 5–10.5)
RBC # BLD AUTO: 3.56 M/UL (ref 4–5.2)
REAGIN+T PALLIDUM IGG+IGM SERPL-IMP: NORMAL
WBC # BLD AUTO: 7.7 K/UL (ref 4–11)

## 2023-12-05 PROCEDURE — 80307 DRUG TEST PRSMV CHEM ANLYZR: CPT

## 2023-12-05 PROCEDURE — 85025 COMPLETE CBC W/AUTO DIFF WBC: CPT

## 2023-12-05 PROCEDURE — 7200000001 HC VAGINAL DELIVERY

## 2023-12-05 PROCEDURE — 86850 RBC ANTIBODY SCREEN: CPT

## 2023-12-05 PROCEDURE — 99999 PR OFFICE/OUTPT VISIT,PROCEDURE ONLY: CPT | Performed by: OBSTETRICS & GYNECOLOGY

## 2023-12-05 PROCEDURE — 0KQM0ZZ REPAIR PERINEUM MUSCLE, OPEN APPROACH: ICD-10-PCS | Performed by: OBSTETRICS & GYNECOLOGY

## 2023-12-05 PROCEDURE — 10907ZC DRAINAGE OF AMNIOTIC FLUID, THERAPEUTIC FROM PRODUCTS OF CONCEPTION, VIA NATURAL OR ARTIFICIAL OPENING: ICD-10-PCS | Performed by: OBSTETRICS & GYNECOLOGY

## 2023-12-05 PROCEDURE — 6360000002 HC RX W HCPCS: Performed by: OBSTETRICS & GYNECOLOGY

## 2023-12-05 PROCEDURE — 6370000000 HC RX 637 (ALT 250 FOR IP): Performed by: OBSTETRICS & GYNECOLOGY

## 2023-12-05 PROCEDURE — 86900 BLOOD TYPING SEROLOGIC ABO: CPT

## 2023-12-05 PROCEDURE — 1220000000 HC SEMI PRIVATE OB R&B

## 2023-12-05 PROCEDURE — 2580000003 HC RX 258: Performed by: OBSTETRICS & GYNECOLOGY

## 2023-12-05 PROCEDURE — 59400 OBSTETRICAL CARE: CPT | Performed by: OBSTETRICS & GYNECOLOGY

## 2023-12-05 PROCEDURE — 86901 BLOOD TYPING SEROLOGIC RH(D): CPT

## 2023-12-05 PROCEDURE — 86780 TREPONEMA PALLIDUM: CPT

## 2023-12-05 RX ORDER — IBUPROFEN 800 MG/1
800 TABLET ORAL EVERY 8 HOURS SCHEDULED
Status: DISCONTINUED | OUTPATIENT
Start: 2023-12-05 | End: 2023-12-07 | Stop reason: HOSPADM

## 2023-12-05 RX ORDER — DIPHENHYDRAMINE HYDROCHLORIDE 50 MG/ML
25 INJECTION INTRAMUSCULAR; INTRAVENOUS EVERY 4 HOURS PRN
Status: DISCONTINUED | OUTPATIENT
Start: 2023-12-05 | End: 2023-12-07 | Stop reason: HOSPADM

## 2023-12-05 RX ORDER — ACETAMINOPHEN 325 MG/1
650 TABLET ORAL EVERY 4 HOURS PRN
Status: DISCONTINUED | OUTPATIENT
Start: 2023-12-05 | End: 2023-12-05 | Stop reason: SDUPTHER

## 2023-12-05 RX ORDER — SODIUM CHLORIDE 9 MG/ML
INJECTION, SOLUTION INTRAVENOUS PRN
Status: DISCONTINUED | OUTPATIENT
Start: 2023-12-05 | End: 2023-12-07 | Stop reason: HOSPADM

## 2023-12-05 RX ORDER — MISOPROSTOL 100 UG/1
800 TABLET ORAL PRN
Status: DISCONTINUED | OUTPATIENT
Start: 2023-12-05 | End: 2023-12-07 | Stop reason: HOSPADM

## 2023-12-05 RX ORDER — SIMETHICONE 80 MG
80 TABLET,CHEWABLE ORAL EVERY 6 HOURS PRN
Status: DISCONTINUED | OUTPATIENT
Start: 2023-12-05 | End: 2023-12-05 | Stop reason: SDUPTHER

## 2023-12-05 RX ORDER — SODIUM CHLORIDE 0.9 % (FLUSH) 0.9 %
5-40 SYRINGE (ML) INJECTION EVERY 12 HOURS SCHEDULED
Status: DISCONTINUED | OUTPATIENT
Start: 2023-12-05 | End: 2023-12-07 | Stop reason: HOSPADM

## 2023-12-05 RX ORDER — HYDROCODONE BITARTRATE AND ACETAMINOPHEN 5; 325 MG/1; MG/1
2 TABLET ORAL EVERY 4 HOURS PRN
Status: DISCONTINUED | OUTPATIENT
Start: 2023-12-05 | End: 2023-12-07 | Stop reason: HOSPADM

## 2023-12-05 RX ORDER — SODIUM CHLORIDE 9 MG/ML
25 INJECTION, SOLUTION INTRAVENOUS PRN
Status: DISCONTINUED | OUTPATIENT
Start: 2023-12-05 | End: 2023-12-05 | Stop reason: SDUPTHER

## 2023-12-05 RX ORDER — ONDANSETRON 2 MG/ML
4 INJECTION INTRAMUSCULAR; INTRAVENOUS EVERY 6 HOURS PRN
Status: DISCONTINUED | OUTPATIENT
Start: 2023-12-05 | End: 2023-12-07 | Stop reason: HOSPADM

## 2023-12-05 RX ORDER — LIDOCAINE HYDROCHLORIDE 10 MG/ML
INJECTION, SOLUTION INFILTRATION; PERINEURAL
Status: DISPENSED
Start: 2023-12-05 | End: 2023-12-06

## 2023-12-05 RX ORDER — SODIUM CHLORIDE, SODIUM LACTATE, POTASSIUM CHLORIDE, AND CALCIUM CHLORIDE .6; .31; .03; .02 G/100ML; G/100ML; G/100ML; G/100ML
1000 INJECTION, SOLUTION INTRAVENOUS PRN
Status: DISCONTINUED | OUTPATIENT
Start: 2023-12-05 | End: 2023-12-07 | Stop reason: HOSPADM

## 2023-12-05 RX ORDER — SODIUM CHLORIDE, SODIUM LACTATE, POTASSIUM CHLORIDE, CALCIUM CHLORIDE 600; 310; 30; 20 MG/100ML; MG/100ML; MG/100ML; MG/100ML
INJECTION, SOLUTION INTRAVENOUS CONTINUOUS
Status: DISCONTINUED | OUTPATIENT
Start: 2023-12-05 | End: 2023-12-07 | Stop reason: HOSPADM

## 2023-12-05 RX ORDER — DOCUSATE SODIUM 100 MG/1
100 CAPSULE, LIQUID FILLED ORAL 2 TIMES DAILY PRN
Status: DISCONTINUED | OUTPATIENT
Start: 2023-12-05 | End: 2023-12-07 | Stop reason: HOSPADM

## 2023-12-05 RX ORDER — SODIUM CHLORIDE, SODIUM LACTATE, POTASSIUM CHLORIDE, AND CALCIUM CHLORIDE .6; .31; .03; .02 G/100ML; G/100ML; G/100ML; G/100ML
500 INJECTION, SOLUTION INTRAVENOUS PRN
Status: DISCONTINUED | OUTPATIENT
Start: 2023-12-05 | End: 2023-12-07 | Stop reason: HOSPADM

## 2023-12-05 RX ORDER — ONDANSETRON 2 MG/ML
4 INJECTION INTRAMUSCULAR; INTRAVENOUS EVERY 6 HOURS PRN
Status: DISCONTINUED | OUTPATIENT
Start: 2023-12-05 | End: 2023-12-05 | Stop reason: SDUPTHER

## 2023-12-05 RX ORDER — ACETAMINOPHEN 500 MG
1000 TABLET ORAL EVERY 8 HOURS PRN
Status: DISCONTINUED | OUTPATIENT
Start: 2023-12-05 | End: 2023-12-07 | Stop reason: HOSPADM

## 2023-12-05 RX ORDER — POLYETHYLENE GLYCOL 3350 17 G/17G
17 POWDER, FOR SOLUTION ORAL DAILY
Status: DISCONTINUED | OUTPATIENT
Start: 2023-12-05 | End: 2023-12-07 | Stop reason: HOSPADM

## 2023-12-05 RX ORDER — SODIUM CHLORIDE 0.9 % (FLUSH) 0.9 %
5-40 SYRINGE (ML) INJECTION PRN
Status: DISCONTINUED | OUTPATIENT
Start: 2023-12-05 | End: 2023-12-07 | Stop reason: HOSPADM

## 2023-12-05 RX ORDER — CARBOPROST TROMETHAMINE 250 UG/ML
250 INJECTION, SOLUTION INTRAMUSCULAR PRN
Status: DISCONTINUED | OUTPATIENT
Start: 2023-12-05 | End: 2023-12-07 | Stop reason: HOSPADM

## 2023-12-05 RX ORDER — HYDROCODONE BITARTRATE AND ACETAMINOPHEN 5; 325 MG/1; MG/1
1 TABLET ORAL EVERY 4 HOURS PRN
Status: DISCONTINUED | OUTPATIENT
Start: 2023-12-05 | End: 2023-12-07 | Stop reason: HOSPADM

## 2023-12-05 RX ORDER — FERROUS SULFATE 325(65) MG
325 TABLET ORAL 2 TIMES DAILY WITH MEALS
Status: DISCONTINUED | OUTPATIENT
Start: 2023-12-05 | End: 2023-12-07 | Stop reason: HOSPADM

## 2023-12-05 RX ORDER — LANOLIN 100 %
OINTMENT (GRAM) TOPICAL PRN
Status: DISCONTINUED | OUTPATIENT
Start: 2023-12-05 | End: 2023-12-07 | Stop reason: HOSPADM

## 2023-12-05 RX ORDER — SIMETHICONE 80 MG
80 TABLET,CHEWABLE ORAL EVERY 6 HOURS PRN
Status: DISCONTINUED | OUTPATIENT
Start: 2023-12-05 | End: 2023-12-07 | Stop reason: HOSPADM

## 2023-12-05 RX ORDER — DOCUSATE SODIUM 100 MG/1
100 CAPSULE, LIQUID FILLED ORAL 2 TIMES DAILY
Status: DISCONTINUED | OUTPATIENT
Start: 2023-12-05 | End: 2023-12-07 | Stop reason: HOSPADM

## 2023-12-05 RX ORDER — IBUPROFEN 800 MG/1
800 TABLET ORAL EVERY 8 HOURS PRN
Status: DISCONTINUED | OUTPATIENT
Start: 2023-12-05 | End: 2023-12-07 | Stop reason: HOSPADM

## 2023-12-05 RX ORDER — SODIUM CHLORIDE, SODIUM LACTATE, POTASSIUM CHLORIDE, CALCIUM CHLORIDE 600; 310; 30; 20 MG/100ML; MG/100ML; MG/100ML; MG/100ML
INJECTION, SOLUTION INTRAVENOUS CONTINUOUS
Status: DISCONTINUED | OUTPATIENT
Start: 2023-12-05 | End: 2023-12-05 | Stop reason: SDUPTHER

## 2023-12-05 RX ORDER — METHYLERGONOVINE MALEATE 0.2 MG/ML
200 INJECTION INTRAVENOUS PRN
Status: DISCONTINUED | OUTPATIENT
Start: 2023-12-05 | End: 2023-12-07 | Stop reason: HOSPADM

## 2023-12-05 RX ORDER — TERBUTALINE SULFATE 1 MG/ML
0.25 INJECTION, SOLUTION SUBCUTANEOUS ONCE
Status: DISCONTINUED | OUTPATIENT
Start: 2023-12-05 | End: 2023-12-07 | Stop reason: HOSPADM

## 2023-12-05 RX ADMIN — WITCH HAZEL: 500 SOLUTION RECTAL; TOPICAL at 12:17

## 2023-12-05 RX ADMIN — Medication: at 12:17

## 2023-12-05 RX ADMIN — MOXIFLOXACIN HYDROCHLORIDE 800 MG: 400 TABLET, FILM COATED ORAL at 14:17

## 2023-12-05 RX ADMIN — SODIUM CHLORIDE, POTASSIUM CHLORIDE, SODIUM LACTATE AND CALCIUM CHLORIDE: 600; 310; 30; 20 INJECTION, SOLUTION INTRAVENOUS at 09:34

## 2023-12-05 RX ADMIN — Medication 1500 MG: at 09:35

## 2023-12-05 RX ADMIN — MOXIFLOXACIN HYDROCHLORIDE 800 MG: 400 TABLET, FILM COATED ORAL at 21:19

## 2023-12-05 RX ADMIN — ACETAMINOPHEN 1000 MG: 500 TABLET ORAL at 18:38

## 2023-12-05 RX ADMIN — Medication 166.7 ML: at 13:20

## 2023-12-05 RX ADMIN — Medication 87 MILLI-UNITS/MIN: at 13:34

## 2023-12-05 ASSESSMENT — PAIN - FUNCTIONAL ASSESSMENT: PAIN_FUNCTIONAL_ASSESSMENT: ACTIVITIES ARE NOT PREVENTED

## 2023-12-05 ASSESSMENT — PAIN SCALES - GENERAL
PAINLEVEL_OUTOF10: 1
PAINLEVEL_OUTOF10: 0
PAINLEVEL_OUTOF10: 2

## 2023-12-05 ASSESSMENT — PAIN DESCRIPTION - LOCATION
LOCATION: ABDOMEN
LOCATION: PERINEUM

## 2023-12-05 ASSESSMENT — PAIN DESCRIPTION - DESCRIPTORS
DESCRIPTORS: SORE
DESCRIPTORS: CRAMPING

## 2023-12-05 NOTE — PLAN OF CARE
Problem: Pain  Goal: Verbalizes/displays adequate comfort level or baseline comfort level  12/5/2023 1825 by Jazmín Kidd RN  Outcome: Progressing  12/5/2023 1452 by Diana Miller RN  Outcome: Progressing  12/5/2023 0959 by Diana Miller RN  Outcome: Progressing  Flowsheets (Taken 12/5/2023 6199)  Verbalizes/displays adequate comfort level or baseline comfort level:   Encourage patient to monitor pain and request assistance   Assess pain using appropriate pain scale   Administer analgesics based on type and severity of pain and evaluate response   Implement non-pharmacological measures as appropriate and evaluate response   Consider cultural and social influences on pain and pain management   Notify Licensed Independent Practitioner if interventions unsuccessful or patient reports new pain     Problem: Infection - Adult  Goal: Absence of infection at discharge  12/5/2023 1825 by Jazmín Kidd RN  Outcome: Progressing  12/5/2023 1452 by Diana Miller RN  Outcome: Progressing  12/5/2023 0959 by Diana Miller RN  Outcome: Progressing  Goal: Absence of infection during hospitalization  12/5/2023 1825 by Jazmín Kidd RN  Outcome: Progressing  12/5/2023 1452 by Diana Miller RN  Outcome: Progressing  12/5/2023 0959 by Diana Miller RN  Outcome: Progressing  Goal: Absence of fever/infection during anticipated neutropenic period  12/5/2023 1825 by Jazmín Kidd RN  Outcome: Progressing  12/5/2023 1452 by Diana Miller RN  Outcome: Progressing  12/5/2023 0959 by Diana Miller RN  Outcome: Progressing     Problem: Safety - Adult  Goal: Free from fall injury  12/5/2023 1825 by Jazmín Kidd RN  Outcome: Progressing  12/5/2023 1452 by Diana Miller RN  Outcome: Progressing  12/5/2023 0959 by Diana Miller RN  Outcome: Progressing     Problem: Discharge Planning  Goal: Discharge to home or other facility with appropriate resources  12/5/2023 1825 by

## 2023-12-05 NOTE — L&D DELIVERY NOTE
[facility] Obstetrics and Gynecology  Spontaneous Vaginal Delivery Note        Pre-operative Diagnosis:    IUP at 40+4  Sjogren's syndrome  Anemia in pregnancy  History of [facility]  Short interval pregnancy  Crohn's disease      Post-operative Diagnosis:   Postpartum s/p vaginal delivery  Sjogren's syndrome  Anemia in pregnancy  History of [facility]  Short interval pregnancy  Crohn's disease              Anesthesia:  none      Admission and Delivery:       Patient presented to [address] [place] [address] and Delivery for scheduled induction on 12/5/23, however was noted to be in labor on arrival. Patient was admitted for labor. She was 4/60/-2 on admission. She received no medication for pain control. Patient was augmented with AROM and progressed spontaneously to complete cervical dilation. Began pushing and with good maternal effort. The infant was delivered in the OA position. Nuchal cord x1 easily reduced at perineum. The shoulders and body were quickly to follow with maternal efforts. Infant was delivered with good tone and spontaneous cry without complication. Mouth and nose were bulb suctioned at maternal abdomen. Cord segment and cord blood were obtained. Cord gasses were collected, however due to fetal wellbeing were discarded. APGARS were noted to be 9 and 9. Delivery Date and Time: 12/5/2023 at 1314. The placenta was delivered spontaneously with manual massage of the uterus and was noted to be intact with a 3 vessel cord. Pitocin was started immediately after placenta delivery. Bleeding noted to be appropriate. Second degree laceration was noted and repaired with 3-0 Vicryl. The patient tolerated well and is in stable condition following delivery. EBL: 300 ml     Infant Wt: Pending     APGARS: 9, 9      Specimen:  Placenta / Cord segment     Infant Feeding: breast     Condition:  infant stable and mother stable      Attending Attestation: I performed the procedure.     Darell Reyna MD

## 2023-12-05 NOTE — CONSULTS
Pharmacy Note  Vancomycin Consult    Ignacio Baires is a 29 y.o. female started on Vancomycin for GBS prophylaxis. Consult received from Dr. Kelley Varela to manage therapy. Allergies:  Amoxicillin and Pcn [penicillins]     No results for input(s): \"CREATININE\" in the last 72 hours. No results for input(s): \"WBC\" in the last 72 hours. CrCl cannot be calculated (Patient's most recent lab result is older than the maximum 30 days allowed. ). No intake or output data in the 24 hours ending 12/05/23 0902  Wt Readings from Last 1 Encounters:   12/04/23 73.8 kg (162 lb 12.8 oz)       There is no height or weight on file to calculate BMI. Assessment/Plan:  Vancomycin 1500 mg IV every 8 hours for GBS prophylaxis. Ordered for 6 doses or until delivery. Thank you for the consult.     Kimberly Sanders, PharmD    12/5/2023 9:02 AM

## 2023-12-05 NOTE — PLAN OF CARE
Problem: Pain  Goal: Verbalizes/displays adequate comfort level or baseline comfort level  Outcome: Progressing  Flowsheets (Taken 2023 3986)  Verbalizes/displays adequate comfort level or baseline comfort level:   Encourage patient to monitor pain and request assistance   Assess pain using appropriate pain scale   Administer analgesics based on type and severity of pain and evaluate response   Implement non-pharmacological measures as appropriate and evaluate response   Consider cultural and social influences on pain and pain management   Notify Licensed Independent Practitioner if interventions unsuccessful or patient reports new pain     Problem: Vaginal Birth or  Section  Goal: Fetal and maternal status remain reassuring during the birth process  Description:  Birth OB-Pregnancy care plan goal which identifies if the fetal and maternal status remain reassuring during the birth process  Outcome: Progressing     Problem: Infection - Adult  Goal: Absence of infection at discharge  Outcome: Progressing  Goal: Absence of infection during hospitalization  Outcome: Progressing  Goal: Absence of fever/infection during anticipated neutropenic period  Outcome: Progressing     Problem: Safety - Adult  Goal: Free from fall injury  Outcome: Progressing     Problem: Discharge Planning  Goal: Discharge to home or other facility with appropriate resources  Outcome: Progressing     Problem: Chronic Conditions and Co-morbidities  Goal: Patient's chronic conditions and co-morbidity symptoms are monitored and maintained or improved  Outcome: Progressing

## 2023-12-05 NOTE — PROGRESS NOTES
Occupational Therapy and Feeding Daily Note     Today's date: 2021  Patient name: Kye Cobian  : 2016  MRN: [id number]  Referring provider: Caio Reyes MD  Dx:   Encounter Diagnosis     ICD-10-CM    1  Lack of expected normal physiological development  R62 50    2  Feeding difficulties  R63 3          1*   from 2021 to 2021    Subjective: Patient was brought to therapy by his mother who remained in the car due to [place] restrictions  COVID screen completed and temp WFL  Objective:   Patient was noted to have a high level of arousal when entering treatment area  Started on swing with fine motor task to work on arousal levels and fine motor skills  Patient was able to use a pincer grasp to peel tape off puzzle pieces  He used a lateral pincer grasp to hold puzzle pieces by knob when prompted by therapist  He was able to visually scan vertical line to fine the correct picture 100% of the time  Completed coloring task to work on visual motor skills            Pt was seated in booster chair and stool underneath for foot support; improved ability to keep feet on stool and sit up straight after therapist provided verbal cues to keep feet still  Completed oral motor prep today with use of chewy tube and whistle  Presented with bubbles on table to wash hands with good participation  He passed out plates independently  Patient was presented with: gold fish, farhad, cheddar cheese puff  Ramen noodles, mini M&M    Full oral acceptance: all presented food    Assessment: patient was seated in booster seat today to improve posture and positioning during mealtime  He showed improved ability to maintain an upright position in the booster seat today with improved ability to keep feet on stool  He showed improvements in bite/tear/pull and amount of food placed in mouth today  He sucked juice from farhad and took small bite which he expelled due to to "stringy" pieces  Patient presents ambulatory to [facility] accompanied by significant other for scheduled induction of labor. Patient ambulated to room, urine obtained. Paitent placed in labor bed, external fetal monitors applied. Bed placed in low position and locked. Upper siderails up x 2, call light in reach. Admission papers and consents reviewed and signed. Plan of care discussed. Monitors explained. Patient reports +contractions since 0300 today with scant bloody show. Denies SROM or decreased fetal movement. Dr. Bere Flores in house and notified of patient's admission and SVE. Plan to continue to monitor for labor. Plan of care discussed with patient. Patient verbalized understanding and consent. Therapist cut up farhad into a very small piece and remove string which he accepted and tolerated without difficulty  Cheyenne Dennis showed improved ability to use fork today and even imitated therapist "twilring" fork to  noodles  He was able to clear noodles from fork with good lip closure 80% of the time  He was able to manipulate noodles and eat more than 1/2 of noodles before fatiguing  Plan: Continue per plan of care  Feeding Goals:   STG:  Cheyenne Dennis will show improvements in fine motor skills and upper limb coordination as demonstrated by independently spearing food to fork and bringing to mouth with less than 50% spillage over 3  Consecutive sessions within 12 weeks  (current level=min a to spear food and 75% spillage)      STG: Cheyenne Dennis will show improvements in bite/tear/pull sequence as demonstrated by completing bite/tear/pull on soft solids using appropriate bite size on 3/4x within 12 weeks  - partially met  This is improving however patient still benefits from min a to maintain appropriate bite size  Following a 2 week break in therapy due to illness and schedule conflicts patient demonstrated a decrease in skill set       STG: Cheyenne Dennis will show improvements in core strength and stability as demonstrated by sitting upright in child sized chair for greater than 10 minutes with no more than 3 verbal cues to sit upright while engaged in meal to improve his mealtime participation in his home and community environment(currently patient benefits from sitting in bolster chair)       Traditional OT goals   STG: Pierre Cobian will demonstrate improvements in visual motor skills as needed to copy a cross and diagnol lines with verbal cues only 3/4x within 12 weeks( current level=min a and 100% verbal cues)      STG: Pierre will demonstrate improvements in bilateral coordination as needed to use his L hand to stabilize paper or toys during fine motor tasks independently 3/4x within 12 weeks  - partially met- he requires verbal cues(improvements from last assessment as patient required tactile prompts last last assessment period)      STG: Zhanna Bartlett will demonstrate improvements in fine motor skills as needed to maintain a quad grasp on a writing utensil to make more than 4 horizontal or vertical lines consecutively 3/4x within 12 weeks   - partially met- continue with goal

## 2023-12-05 NOTE — H&P
Obstetrics Admission History and Physical    CC: induction of labor    HPI: Roseann Jama is a 29 y.o. [id number] at 40w4d who presents for scheduled induction of labor. Pregnancy has been complicated by Sjogren's (SSA and SSB positive), anemia, history of conization, short interval pregnancy, GDM in 2nd pregnancy, and Crohns. Doing well today, reports started having contractions last night, worsened around 3am. Having +bloody show, no LOF. +FM. Review of Systems: The following ROS was otherwise negative, except as noted in the HPI: constitutional, HEENT, respiratory, cardiovascular, gastrointestinal, genitourinary, skin, musculoskeletal, neurological, psych. OBGYN Provider : Beerebakari    Obstetrical History:  OB History    Para Term  AB Living   4 3 3 0 0 3   SAB IAB Ectopic Molar Multiple Live Births   0 0 0 0 0 3      # Outcome Date GA Lbr Yuri/2nd Weight Sex Delivery Anes PTL Lv   4 Current            3 Term 22 39w6d 00:37 / 00:01 2.98 kg (6 lb 9.1 oz) F Vag-Spont Local N ROSA      Name: Sonali Hu: 8  Apgar5: 9   2 Term 17    F Vag-Spont   ROSA      Name: Lee Ann Stall   1 Term 07/02/15    Elberonalicia Cancino   ROSA      Name: Rosa Maria Das     Past Medical History:   Past Medical History:   Diagnosis Date    Abnormal Pap smear of cervix     Anemia affecting pregnancy, antepartum 2022    Autoimmune disorder ([facility])     Crohn's disease ([facility])     Gestational diabetes 2018    Sjogren's syndrome ([facility])        Medications:  No current facility-administered medications on file prior to encounter. Current Outpatient Medications on File Prior to Encounter   Medication Sig Dispense Refill    Prenatal MV-Min-Fe Fum-FA-DHA (PRENATAL 1 PO) Take by mouth      Lancets MISC 1 each by Does not apply route in the morning and at bedtime 100 each 1    blood glucose monitor strips Test 2 times a day & as needed for symptoms of irregular blood glucose.  Dispense sufficient amount for indicated 2023   Physical Exam  HENT:      Head: Normocephalic. Cardiovascular:      Rate and Rhythm: Normal rate. Pulmonary:      Effort: Pulmonary effort is normal.   Abdominal:      Palpations: Abdomen is soft. Tenderness: There is no abdominal tenderness. Skin:     General: Skin is warm and dry. Neurological:      General: No focal deficit present. Mental Status: She is alert. Psychiatric:         Mood and Affect: Mood normal.         Cervix: 4-5cm per RN     Fetal Heart Monitor Interpretation:   FHT: 135 bpm, moderate variability, + accels, occ henrik decels  Sawpit: 4-6min with irritability    Labs:  No visits with results within 1 Day(s) from this visit. Latest known visit with results is:   Routine Prenatal on 2023   Component Date Value    Trichomonas Vaginalis DNA 2023                      Value:Negative DNA not detected. Normal range: Negative DNA not detected. Gardnerella Vaginalis, D* 2023  (A)                     Value:POSITIVE DNA Probe detected. Normal range: Negative DNA not detected. Candida Species, DNA Pro* 2023  (A)                     Value:POSITIVE DNA Probe detected. Normal range: Negative DNA not detected. Assessment/Plan:   29 y.o.  at 40w4d here for IOL/labor    FWB - reassuring  - cEFM    2. IOL/Labor  - pt is 4-5cm at this time, rosemary on monitor, will obs for next few hours, consider AROM at that time if unchanged and able    3. Sjogren's syndrome  - +SSA/SSB ab  - previously saw MFM in last pregnancy, declined follow-up with them and [address] [place] [address] this pregnancy for increased surveillance/echo etc    4. S/p CKC    5. Crohn's disease    6.  A+/ab-, RNI, GBS+  - vanc ordered (PCN allergy, no sensitivities)    Dispo: admit for labor, delivery and PP care  Lea Canavan, MD

## 2023-12-05 NOTE — PLAN OF CARE
Problem: Pain  Goal: Verbalizes/displays adequate comfort level or baseline comfort level  12/5/2023 1452 by Sloan Batista RN  Outcome: Progressing  12/5/2023 0959 by Sloan Batista RN  Outcome: Progressing  Flowsheets (Taken 12/5/2023 7212)  Verbalizes/displays adequate comfort level or baseline comfort level:   Encourage patient to monitor pain and request assistance   Assess pain using appropriate pain scale   Administer analgesics based on type and severity of pain and evaluate response   Implement non-pharmacological measures as appropriate and evaluate response   Consider cultural and social influences on pain and pain management   Notify Licensed Independent Practitioner if interventions unsuccessful or patient reports new pain     Problem: Infection - Adult  Goal: Absence of infection at discharge  12/5/2023 1452 by Sloan Batista RN  Outcome: Progressing  12/5/2023 0959 by Sloan Batista RN  Outcome: Progressing  Goal: Absence of infection during hospitalization  12/5/2023 1452 by Sloan Batista RN  Outcome: Progressing  12/5/2023 0959 by Sloan Batista RN  Outcome: Progressing  Goal: Absence of fever/infection during anticipated neutropenic period  12/5/2023 1452 by Sloan Batista RN  Outcome: Progressing  12/5/2023 0959 by Sloan Batista RN  Outcome: Progressing     Problem: Safety - Adult  Goal: Free from fall injury  12/5/2023 1452 by Sloan Batista RN  Outcome: Progressing  12/5/2023 0959 by Sloan Batista RN  Outcome: Progressing     Problem: Discharge Planning  Goal: Discharge to home or other facility with appropriate resources  12/5/2023 1452 by Sloan Batista RN  Outcome: Progressing  12/5/2023 0959 by Sloan Batista RN  Outcome: Progressing     Problem: Chronic Conditions and Co-morbidities  Goal: Patient's chronic conditions and co-morbidity symptoms are monitored and maintained or improved  12/5/2023 1452 by Sloan Batista RN  Outcome:

## 2023-12-06 LAB
ANION GAP SERPL CALCULATED.3IONS-SCNC: 8 MMOL/L (ref 3–16)
BUN SERPL-MCNC: 5 MG/DL (ref 7–20)
CALCIUM SERPL-MCNC: 8.5 MG/DL (ref 8.3–10.6)
CHLORIDE SERPL-SCNC: 104 MMOL/L (ref 99–110)
CO2 SERPL-SCNC: 22 MMOL/L (ref 21–32)
CREAT SERPL-MCNC: 0.6 MG/DL (ref 0.6–1.1)
DEPRECATED RDW RBC AUTO: 16.5 % (ref 12.4–15.4)
GFR SERPLBLD CREATININE-BSD FMLA CKD-EPI: >60 ML/MIN/{1.73_M2}
GLUCOSE SERPL-MCNC: 75 MG/DL (ref 70–99)
HCT VFR BLD AUTO: 26.9 % (ref 36–48)
HGB BLD-MCNC: 9.3 G/DL (ref 12–16)
MCH RBC QN AUTO: 31.7 PG (ref 26–34)
MCHC RBC AUTO-ENTMCNC: 34.7 G/DL (ref 31–36)
MCV RBC AUTO: 91.4 FL (ref 80–100)
PLATELET # BLD AUTO: 147 K/UL (ref 135–450)
PMV BLD AUTO: 8.5 FL (ref 5–10.5)
POTASSIUM SERPL-SCNC: 4 MMOL/L (ref 3.5–5.1)
RBC # BLD AUTO: 2.94 M/UL (ref 4–5.2)
SODIUM SERPL-SCNC: 134 MMOL/L (ref 136–145)
VANCOMYCIN SERPL-MCNC: <4 UG/ML
WBC # BLD AUTO: 7.4 K/UL (ref 4–11)

## 2023-12-06 PROCEDURE — 80202 ASSAY OF VANCOMYCIN: CPT

## 2023-12-06 PROCEDURE — 1220000000 HC SEMI PRIVATE OB R&B

## 2023-12-06 PROCEDURE — 6370000000 HC RX 637 (ALT 250 FOR IP): Performed by: OBSTETRICS & GYNECOLOGY

## 2023-12-06 PROCEDURE — 80048 BASIC METABOLIC PNL TOTAL CA: CPT

## 2023-12-06 PROCEDURE — 99024 POSTOP FOLLOW-UP VISIT: CPT | Performed by: OBSTETRICS & GYNECOLOGY

## 2023-12-06 PROCEDURE — 85027 COMPLETE CBC AUTOMATED: CPT

## 2023-12-06 PROCEDURE — 36415 COLL VENOUS BLD VENIPUNCTURE: CPT

## 2023-12-06 RX ORDER — BENZONATATE 100 MG/1
100 CAPSULE ORAL 3 TIMES DAILY PRN
Status: DISCONTINUED | OUTPATIENT
Start: 2023-12-06 | End: 2023-12-07 | Stop reason: HOSPADM

## 2023-12-06 RX ADMIN — MOXIFLOXACIN HYDROCHLORIDE 800 MG: 400 TABLET, FILM COATED ORAL at 05:29

## 2023-12-06 RX ADMIN — MOXIFLOXACIN HYDROCHLORIDE 800 MG: 400 TABLET, FILM COATED ORAL at 05:30

## 2023-12-06 RX ADMIN — DOCUSATE SODIUM 100 MG: 100 CAPSULE, LIQUID FILLED ORAL at 09:07

## 2023-12-06 RX ADMIN — ACETAMINOPHEN 1000 MG: 500 TABLET ORAL at 03:08

## 2023-12-06 RX ADMIN — BENZONATATE 100 MG: 100 CAPSULE ORAL at 18:30

## 2023-12-06 RX ADMIN — MOXIFLOXACIN HYDROCHLORIDE 800 MG: 400 TABLET, FILM COATED ORAL at 22:30

## 2023-12-06 RX ADMIN — MOXIFLOXACIN HYDROCHLORIDE 800 MG: 400 TABLET, FILM COATED ORAL at 22:27

## 2023-12-06 RX ADMIN — MOXIFLOXACIN HYDROCHLORIDE 800 MG: 400 TABLET, FILM COATED ORAL at 14:26

## 2023-12-06 ASSESSMENT — PAIN SCALES - GENERAL
PAINLEVEL_OUTOF10: 2

## 2023-12-06 ASSESSMENT — PAIN DESCRIPTION - LOCATION
LOCATION: BACK
LOCATION: ABDOMEN
LOCATION: ABDOMEN

## 2023-12-06 ASSESSMENT — PAIN - FUNCTIONAL ASSESSMENT: PAIN_FUNCTIONAL_ASSESSMENT: ACTIVITIES ARE NOT PREVENTED

## 2023-12-06 ASSESSMENT — PAIN DESCRIPTION - ORIENTATION: ORIENTATION: LOWER

## 2023-12-06 ASSESSMENT — PAIN DESCRIPTION - DESCRIPTORS
DESCRIPTORS: CRAMPING
DESCRIPTORS: CRAMPING

## 2023-12-06 NOTE — PROGRESS NOTES
12/06/23 1446   Encounter Summary   Encounter Overview/Reason  Rituals, Rites and Sacraments   Service Provided For: Patient and family together   Referral/Consult From: Nurse;Patient   Support System Spouse;Parent   Last Encounter  12/06/23  (family at bedside, blessing of the baby , prayer, scripture readings)   Complexity of Encounter High   Begin Time 1400   End Time  1440   Total Time Calculated 40 min   Rituals, Rites and Sacraments   Type Blessings

## 2023-12-06 NOTE — LACTATION NOTE
This note was copied from a baby's chart. Lactation Progress Note      Data:    Initial consult for multip experienced breast feeder on DOD with an infant born at 40.4 weeks gestation. MOB breast fed her others for 12 months w/o complication. Reports current infant is latching and feeding well, denies pain or soreness. Action:    Introduced self to patient as lactation, name and phone number written on white board in room. Reviewed breastfeeding education with mother; what to expect over the next  24-48 hours with infant feedings, infant output, how to know infant is getting enough, normal  behavior, how to wake a sleepy infant to feed, how the breasts work to make milk, protecting milk supply, what to expect with cluster feeding, how to hand express colostrum, and breast care. Reviewed infant feeding cues and encouraged mother to allow infant to breast feed on demand anytime feeding cues are shown and if no feeding cues are shown to attempt to wake infant to feed every 2-3 hours. If infant is still too sleepy to latch to hand express colostrum into infants mouth for about ten minutes, then try again in 2-3 hours. After the first day of life to breast feed a minimum of 8-12 times a day per 24 hour period. Also encouraged mother to avoid giving infant a pacifier, bottle, or pump for at least the first two weeks of life or until breast feeding is well established. Encouraged good hydration, nutrition, and rest, and to keep taking prenatal or multivitamin while lactating. Encouraged much skin to skin between mother and infant and father and infant. Breast feeding log reviewed, all questions answered. Mother encouraged to call lactation for F/U care as needed. Response:    MOB verbalized an understanding of education provided and will call for assistance as needed.

## 2023-12-06 NOTE — PLAN OF CARE
Problem: Pain  Goal: Verbalizes/displays adequate comfort level or baseline comfort level  12/5/2023 2334 by Vincent Christianson RN  Outcome: Progressing  Flowsheets (Taken 12/5/2023 2200)  Verbalizes/displays adequate comfort level or baseline comfort level:   Encourage patient to monitor pain and request assistance   Assess pain using appropriate pain scale  12/5/2023 1825 by Arpan Duran RN  Outcome: Progressing  12/5/2023 1452 by Ava Koch RN  Outcome: Progressing  12/5/2023 0959 by Ava Koch RN  Outcome: Progressing  Flowsheets (Taken 12/5/2023 7760)  Verbalizes/displays adequate comfort level or baseline comfort level:   Encourage patient to monitor pain and request assistance   Assess pain using appropriate pain scale   Administer analgesics based on type and severity of pain and evaluate response   Implement non-pharmacological measures as appropriate and evaluate response   Consider cultural and social influences on pain and pain management   Notify Licensed Independent Practitioner if interventions unsuccessful or patient reports new pain     Problem: Infection - Adult  Goal: Absence of infection at discharge  12/5/2023 2334 by Vincent Christianson RN  Outcome: Progressing  12/5/2023 1825 by Arpan Duran RN  Outcome: Progressing  12/5/2023 1452 by Ava Koch RN  Outcome: Progressing  12/5/2023 0959 by Ava Koch RN  Outcome: Progressing  Goal: Absence of infection during hospitalization  12/5/2023 2334 by Vincent Christianson RN  Outcome: Progressing  12/5/2023 1825 by Arpan Duran RN  Outcome: Progressing  12/5/2023 1452 by Ava Koch RN  Outcome: Progressing  12/5/2023 0959 by Ava Koch RN  Outcome: Progressing  Goal: Absence of fever/infection during anticipated neutropenic period  12/5/2023 2334 by Vincent Christianson RN  Outcome: Progressing  12/5/2023 1825 by Arpan Duran RN  Outcome: Progressing  12/5/2023 1452 by Ava Koch RN  Outcome:

## 2023-12-07 VITALS
SYSTOLIC BLOOD PRESSURE: 122 MMHG | DIASTOLIC BLOOD PRESSURE: 68 MMHG | HEIGHT: 66 IN | WEIGHT: 161 LBS | RESPIRATION RATE: 16 BRPM | OXYGEN SATURATION: 97 % | BODY MASS INDEX: 25.88 KG/M2 | TEMPERATURE: 97.7 F | HEART RATE: 93 BPM

## 2023-12-07 LAB
ANION GAP SERPL CALCULATED.3IONS-SCNC: 8 MMOL/L (ref 3–16)
BUN SERPL-MCNC: 9 MG/DL (ref 7–20)
CALCIUM SERPL-MCNC: 8.4 MG/DL (ref 8.3–10.6)
CHLORIDE SERPL-SCNC: 103 MMOL/L (ref 99–110)
CO2 SERPL-SCNC: 26 MMOL/L (ref 21–32)
CREAT SERPL-MCNC: 0.6 MG/DL (ref 0.6–1.1)
GFR SERPLBLD CREATININE-BSD FMLA CKD-EPI: >60 ML/MIN/{1.73_M2}
GLUCOSE SERPL-MCNC: 73 MG/DL (ref 70–99)
POTASSIUM SERPL-SCNC: 4.1 MMOL/L (ref 3.5–5.1)
SODIUM SERPL-SCNC: 137 MMOL/L (ref 136–145)

## 2023-12-07 PROCEDURE — 99024 POSTOP FOLLOW-UP VISIT: CPT | Performed by: OBSTETRICS & GYNECOLOGY

## 2023-12-07 PROCEDURE — 36415 COLL VENOUS BLD VENIPUNCTURE: CPT

## 2023-12-07 PROCEDURE — 80048 BASIC METABOLIC PNL TOTAL CA: CPT

## 2023-12-07 PROCEDURE — 6370000000 HC RX 637 (ALT 250 FOR IP): Performed by: OBSTETRICS & GYNECOLOGY

## 2023-12-07 RX ORDER — FERROUS SULFATE 325(65) MG
325 TABLET ORAL 2 TIMES DAILY WITH MEALS
Qty: 30 TABLET | Refills: 3 | Status: SHIPPED | OUTPATIENT
Start: 2023-12-07

## 2023-12-07 RX ORDER — IBUPROFEN 800 MG/1
800 TABLET ORAL EVERY 8 HOURS PRN
Qty: 120 TABLET | Refills: 0 | Status: SHIPPED | OUTPATIENT
Start: 2023-12-07

## 2023-12-07 RX ADMIN — MOXIFLOXACIN HYDROCHLORIDE 800 MG: 400 TABLET, FILM COATED ORAL at 06:45

## 2023-12-07 ASSESSMENT — PAIN SCALES - GENERAL: PAINLEVEL_OUTOF10: 0

## 2023-12-07 NOTE — PROGRESS NOTES
Discharge Phone Call    Patient Name: Shandra Sen     [facility] Care Provider: Donnie Rose MD Discharge Date: 2023    Disposition of baby:    Phone Number: [phone] (home)     Attempts to Contact:  Date:    Caller  Date:    Caller  Date:    Caller    Information for the patient's :  Kacie Wu [[id number]]   Delivery Method: Vaginal, Spontaneous     1. Now that you are at home is your pain being well controlled? Y/N   If no, instruct to call       provider. 2. Are you breastfeeding? Y/N    Do you need any extra support from our lactation staff? Y/N    If yes, provide number for lactation. 3. Have you made or already had your first appointment with the baby's doctor? Y/N   If no, do      you know when to schedule it? Y/N    4. Have you scheduled your follow-up appointment? Y/N  If no, do you know when to schedule       it? Y/N   If no, they can find it on printed discharge instructions. 5. Did staff discuss safe sleep during your stay? Y/N   6. Did we explain things in a way you could understand? Y/N  7. Were we respectful of your preferences for labor and birth and include you in the plan of       care? Y/N  If no, please explain _______________________________________________  8. Is there anyone in particular you would like to mention who provided care for you? _______      _________________________________________________________________________     9. Were you given a Post-Birth Warning Signs handout? Y/N  Do you have it somewhere      easily accessible? Y/N  If no, please send them a copy and ask them to put it somewhere      easily found. 10. Have you been crying excessively, having anger or mood swings that feel out of control, or       feel like you can't cope with caring for yourself or baby? Y/N   If yes, they may be showing       signs of postpartum depression and should call provider.  There is also a        depression test on page C5 in their discharge booklet they can take. 13. Do you have any other questions or concerns I can address today?  Y/N  ______________      _________________________________________________________________________    Information provided during call :_________________________________________________  ___________________________________________________________________________    Call completed by:____________________________    Date:_________ Time:___________

## 2023-12-07 NOTE — DISCHARGE INSTRUCTIONS
Thank you for the opportunity to care for you and your family. We hope that you are happy with the care we provided during your stay in the [facility]. We want to ensure that you have the help you need when you leave the hospital. If there is anything we can assist you with, please let us know. Breastfeeding mothers may contact our lactation specialists with any problems or questions. [facility] lactation services phone number is [phone]. Leave a message and your call will be returned. Please refer to the information provided in the postpartum care booklet. The following are warning signs to remember. Call 911 if you have:    Chest pain or pressure  Shortness of breath, even at rest  Thoughts of harming yourself or others  Seizures    Call your healthcare provider if you have:    Temperature of 100.4 degrees or higher  Stitches that are not healing        -- Swelling, bleeding, drainage, foul odor, redness or warmth in/around your           stitches, staples, or incision (scar)        -- Bad smelling blood or discharge from the vagina  Vaginal bleeding that has increased         -- Soaking through one pad in an hour        -- You are passing clots larger than the size of a lemon  Red, warm tender area(s) in your breast or calf  Headache that does not get better, even after taking medicine; or headache with vision changes    Remember to notify all healthcare providers from your date of delivery to up to one year after giving birth! CARING FOR YOURSELF        DIET/ACTIVITY    Eat a well balanced diet focusing on foods high in fiber and protein. Drink plenty of fluids, especially water. To avoid constipation you may take a mild stool softener as recommended by your doctor or midwife. Gradually increase your activity. Resume an exercise regime only after being advised by your doctor or midwife. When sitting or lying down, keep your legs elevated to reduce swelling.   Avoid

## 2023-12-07 NOTE — DISCHARGE SUMMARY
[facility] Ob/[facility]          Obstetric Discharge Summary        Admitting Diagnosis:   1) Induction of labor   2) Sjogren's syndrome   3) s/p CKC   4) Crohn's disease     Discharge Diagnosis:  1) Induction of labor   2) Sjogren's syndrome   3) s/p CKC   4) Crohn's disease     Procedures:   1) normal spontaneous vaginal delivery    Referrals:    - Lactation Consultant      Complications:  none     Information:   Delivery Date / Time: 2023 13:14  Fetal Weight: 3615 g  APGARS: 9, 9  Fetal Feeding:  Breastfeeding   Gender: Male   Circumcision in hospital: No    Discharge Instructions:    Diet:common adult    Activity: see discharge instructions     Medications:   - Motrin    - Iron     Disposition: Home    Condition on discharge: Stable    Follow up: in 2 week(s)    Linda Ferguson DO

## 2023-12-07 NOTE — PROGRESS NOTES
[facility]  Post Partum Progress Note    Subjective:   Patient is a 29 y.o. y/o  female S/P  at 40.4 EGA. PPD # 2. The pregnancy was complicated by: Sjogren's (SSA and SSB positive), anemia, history of conization, short interval pregnancy, GDM in 2nd pregnancy, and Crohns. Doing well today. No current complaints are noted including headache, change in vision, fever, chills, chest pain, shortness of breath, nausea, vomiting, diarrhea or constipation. The patient denies dizziness with ambulation or calf tenderness. Voiding spontaneously. Cassandra Meridian is described as minimal. The patient plans to breastfeed. Objective:   GENERAL APPEARANCE: alert, well appearing, in no apparent distress  LUNGS: Resp effort normal   ABDOMEN POSTPARTUM: benign non-tender, without masses or organomegaly palpable . Uterine Fundus firm, NT, Below umbilicus.    EXTREMITIES: no redness or tenderness in the calves or thighs, no edema  SKIN: normal coloration and turgor, no rashes, no suspicious skin lesions noted    Pertinent Labs:   Lab Results   Component Value Date    WBC 7.4 2023    HGB 9.3 (L) 2023    HCT 26.9 (L) 2023    MCV 91.4 2023     2023          Assessment / Plan:    PPD #2     - Doing well today     - Meeting milestones     - Routine postpartum care     A+ / RI / GBS pos     Sjogren's syndrome     - No complaints today      Crohn's disease     - No complaints today     Breast feeding     - Lactation support as needed     Infant information     - Delivery date/time: 2023 at 1314     - Gender: Male     - Weight 7lbs 15.5oz     - Apgars 9 & 9      - Circumcision: Declines    Disposition:   Post Partum Instructions reviewed   Anticipate discharge on PPD # 2 pending clinical status     Patricia Morales DO

## 2023-12-07 NOTE — PLAN OF CARE
Problem: Pain  Goal: Verbalizes/displays adequate comfort level or baseline comfort level  Outcome: Progressing  Flowsheets (Taken 2023 by Marilou Kapadia RN)  Verbalizes/displays adequate comfort level or baseline comfort level:   Encourage patient to monitor pain and request assistance   Assess pain using appropriate pain scale     Problem: Infection - Adult  Goal: Absence of infection at discharge  Outcome: Progressing  Goal: Absence of infection during hospitalization  Outcome: Progressing  Goal: Absence of fever/infection during anticipated neutropenic period  Outcome: Progressing     Problem: Safety - Adult  Goal: Free from fall injury  Outcome: Progressing     Problem: Discharge Planning  Goal: Discharge to home or other facility with appropriate resources  Outcome: Progressing     Problem: Chronic Conditions and Co-morbidities  Goal: Patient's chronic conditions and co-morbidity symptoms are monitored and maintained or improved  Outcome: Progressing     Problem: Postpartum  Goal: Experiences normal postpartum course  Description:  Postpartum OB-Pregnancy care plan goal which identifies if the mother is experiencing a normal postpartum course  Outcome: Progressing  Goal: Appropriate maternal -  bonding  Description:  Postpartum OB-Pregnancy care plan goal which identifies if the mother and  are bonding appropriately  Outcome: Progressing  Goal: Establishment of infant feeding pattern  Description:  Postpartum OB-Pregnancy care plan goal which identifies if the mother is establishing a feeding pattern with their   Outcome: Progressing  Goal: Incisions, wounds, or drain sites healing without S/S of infection  Outcome: Progressing  Flowsheets (Taken 2023 by Marilou Kapadia RN)  Incisions, Wounds, or Drain Sites Healing Without Sign and Symptoms of Infection: ADMISSION and DAILY: Assess and document risk factors for pressure ulcer development

## 2023-12-07 NOTE — PROGRESS NOTES
Postpartum and infant care teaching completed. Copy witnessed by RN and given to patient. Patient verbalized understanding of all teaching points. Patient plans to follow-up with OB and  Provider as instructed. Patient verbalizes understanding of discharge instructions and denies further questions. ID bands checked. Mother's ID band and one of baby's ID bands removed and taped to discharge instruction sheet, signed by patient and witnessed by RN. Patient discharged in stable condition accompanied by . Discharged in wheelchair, holding baby in arms.

## 2024-01-17 ENCOUNTER — POSTPARTUM VISIT (OUTPATIENT)
Dept: OBGYN CLINIC | Age: 34
End: 2024-01-17

## 2024-01-17 VITALS
SYSTOLIC BLOOD PRESSURE: 100 MMHG | HEART RATE: 72 BPM | WEIGHT: 139.4 LBS | TEMPERATURE: 97.3 F | BODY MASS INDEX: 22.4 KG/M2 | DIASTOLIC BLOOD PRESSURE: 61 MMHG | HEIGHT: 66 IN

## 2024-01-17 DIAGNOSIS — Z86.32 HISTORY OF GESTATIONAL DIABETES: ICD-10-CM

## 2024-01-17 DIAGNOSIS — M35.00 SJOGREN'S SYNDROME, WITH UNSPECIFIED ORGAN INVOLVEMENT (HCC): ICD-10-CM

## 2024-01-17 DIAGNOSIS — K50.919 CROHN'S DISEASE WITH COMPLICATION, UNSPECIFIED GASTROINTESTINAL TRACT LOCATION (HCC): ICD-10-CM

## 2024-01-17 DIAGNOSIS — Z98.890 S/P CONIZATION OF CERVIX: ICD-10-CM

## 2024-01-17 PROCEDURE — 0503F POSTPARTUM CARE VISIT: CPT | Performed by: OBSTETRICS & GYNECOLOGY

## 2024-01-28 PROBLEM — O09.899 SHORT INTERVAL BETWEEN PREGNANCIES AFFECTING PREGNANCY, ANTEPARTUM: Status: RESOLVED | Noted: 2023-04-19 | Resolved: 2024-01-28

## 2024-01-28 PROBLEM — Z28.39 RUBELLA NON-IMMUNE STATUS, ANTEPARTUM: Status: RESOLVED | Noted: 2023-07-07 | Resolved: 2024-01-28

## 2024-01-28 PROBLEM — O09.899 RUBELLA NON-IMMUNE STATUS, ANTEPARTUM: Status: RESOLVED | Noted: 2023-07-07 | Resolved: 2024-01-28

## 2024-01-28 PROBLEM — O99.019 ANEMIA IN PREGNANCY, UNSPECIFIED TRIMESTER: Status: RESOLVED | Noted: 2023-07-07 | Resolved: 2024-01-28

## 2024-01-28 PROBLEM — Z34.83 PRENATAL CARE, SUBSEQUENT PREGNANCY IN THIRD TRIMESTER: Status: RESOLVED | Noted: 2021-11-13 | Resolved: 2024-01-28

## 2024-01-28 PROBLEM — O99.820 GBS (GROUP B STREPTOCOCCUS CARRIER), +RV CULTURE, CURRENTLY PREGNANT: Status: RESOLVED | Noted: 2023-11-11 | Resolved: 2024-01-28

## 2024-01-29 PROBLEM — Z34.90 ENCOUNTER FOR INDUCTION OF LABOR: Status: RESOLVED | Noted: 2023-12-05 | Resolved: 2024-01-29

## 2024-01-29 ASSESSMENT — ENCOUNTER SYMPTOMS
VOMITING: 0
CONSTIPATION: 0
GASTROINTESTINAL NEGATIVE: 1
SHORTNESS OF BREATH: 0
ABDOMINAL PAIN: 0
RESPIRATORY NEGATIVE: 1
DIARRHEA: 0
NAUSEA: 0

## 2024-01-29 NOTE — PROGRESS NOTES
Subjective:      Patient ID: Mary Valdovinos is a 33 y.o. female.    HPI  34 y/o  female presents for postpartum visit.  Patient is 6 weeks S/P  at 40.4 EGA on 23.  Sustained second degree perineal laceration.     The pregnancy was complicated by: Sjogren's (SSA and SSB positive), anemia, history of conization, short interval pregnancy, GDM in 2nd pregnancy, and Crohns.      Doing well today. Has experienced 2 episodes of bleeding/menses since delivery.  Currently experiencing menstrual bleeding.   Notes random hives on arms, stomach and back.  Has adjusted to clean diet.  Denies headache, change in vision, fever, chills, chest pain, shortness of breath, nausea, vomiting, diarrhea, constipation, dysuria and hematuria. The patient denies calf tenderness. Denies signs and symptoms of depression.     Breastfeeding    Review of Systems   Constitutional: Negative.  Negative for activity change, appetite change, chills, fatigue, fever and unexpected weight change.   Eyes:  Negative for visual disturbance.   Respiratory: Negative.  Negative for shortness of breath.    Cardiovascular: Negative.  Negative for chest pain.   Gastrointestinal: Negative.  Negative for abdominal pain, constipation, diarrhea, nausea and vomiting.   Endocrine: Negative for cold intolerance and heat intolerance.   Genitourinary:  Positive for vaginal bleeding. Negative for difficulty urinating, dysuria, hematuria, pelvic pain, vaginal discharge and vaginal pain.   Skin: Negative.    Neurological:  Negative for headaches.   Hematological:  Does not bruise/bleed easily.   Psychiatric/Behavioral: Negative.  Negative for dysphoric mood and suicidal ideas. The patient is not nervous/anxious.        Objective:   Physical Exam  Vitals and nursing note reviewed.   Constitutional:       General: She is not in acute distress.     Appearance: She is well-developed. She is not ill-appearing, toxic-appearing or diaphoretic.   Pulmonary:

## 2024-03-22 ENCOUNTER — OFFICE VISIT (OUTPATIENT)
Dept: OBGYN CLINIC | Age: 34
End: 2024-03-22
Payer: OTHER GOVERNMENT

## 2024-03-22 VITALS
WEIGHT: 136.2 LBS | HEART RATE: 81 BPM | SYSTOLIC BLOOD PRESSURE: 105 MMHG | BODY MASS INDEX: 21.89 KG/M2 | OXYGEN SATURATION: 98 % | DIASTOLIC BLOOD PRESSURE: 65 MMHG | TEMPERATURE: 98.2 F | HEIGHT: 66 IN

## 2024-03-22 DIAGNOSIS — K50.919 CROHN'S DISEASE WITH COMPLICATION, UNSPECIFIED GASTROINTESTINAL TRACT LOCATION (HCC): ICD-10-CM

## 2024-03-22 DIAGNOSIS — Z01.419 WOMEN'S ANNUAL ROUTINE GYNECOLOGICAL EXAMINATION: Primary | ICD-10-CM

## 2024-03-22 DIAGNOSIS — N87.0 DYSPLASIA OF CERVIX, LOW GRADE (CIN 1): ICD-10-CM

## 2024-03-22 DIAGNOSIS — N63.10 MASS OF RIGHT BREAST, UNSPECIFIED QUADRANT: ICD-10-CM

## 2024-03-22 DIAGNOSIS — Z87.410 HISTORY OF CERVICAL DYSPLASIA: ICD-10-CM

## 2024-03-22 DIAGNOSIS — N92.6 IRREGULAR MENSES: ICD-10-CM

## 2024-03-22 DIAGNOSIS — Z12.4 PAP SMEAR FOR CERVICAL CANCER SCREENING: ICD-10-CM

## 2024-03-22 DIAGNOSIS — Z98.890 S/P CONIZATION OF CERVIX: ICD-10-CM

## 2024-03-22 DIAGNOSIS — M35.00 SJOGREN'S SYNDROME, WITH UNSPECIFIED ORGAN INVOLVEMENT (HCC): ICD-10-CM

## 2024-03-22 DIAGNOSIS — Z86.32 HISTORY OF GESTATIONAL DIABETES: ICD-10-CM

## 2024-03-22 PROCEDURE — 99395 PREV VISIT EST AGE 18-39: CPT | Performed by: OBSTETRICS & GYNECOLOGY

## 2024-03-22 RX ORDER — M-VIT,TX,IRON,MINS/CALC/FOLIC 27MG-0.4MG
1 TABLET ORAL DAILY
COMMUNITY

## 2024-03-25 LAB
HPV HR 12 DNA SPEC QL NAA+PROBE: NOT DETECTED
HPV16 DNA SPEC QL NAA+PROBE: NOT DETECTED
HPV16+18+H RISK 12 DNA SPEC-IMP: NORMAL
HPV18 DNA SPEC QL NAA+PROBE: NOT DETECTED

## 2024-03-29 PROBLEM — Z87.410 HISTORY OF CERVICAL DYSPLASIA: Status: ACTIVE | Noted: 2024-03-29

## 2024-03-29 ASSESSMENT — ENCOUNTER SYMPTOMS
ABDOMINAL PAIN: 0
NAUSEA: 0
ABDOMINAL DISTENTION: 0
CONSTIPATION: 0
VOMITING: 0
SHORTNESS OF BREATH: 0
DIARRHEA: 0

## 2024-03-29 NOTE — PROGRESS NOTES
Subjective:      Patient ID: Mary Valdovinos is a 33 y.o. female.    HPI  32 y/o  female presents for well woman examination.  Last pap smear was 22--LGSIL with positive high risk HPV other types.  Subsequent colposcopy and biopsies on 22 revealed ANTONIA 1/LSIL.  History is positive for HGSIL/ANTONIA 2 prompting cervical conization on 1/15/21 (positive endocervical and ectocervical margins.  Has completed 2 pregnancies since conization.   Patient is 3 months s/p  at 40 weeks 4 days gestation.  Pregnancy was complicated by GDM in 2nd pregnancy, Crohns, Sjogrens, anemia, short interval pregnancy, and history of conization.  Menarche occurred age 14.  Menses since delivery have been every month, heavy.  Most recent cycle has been longer--6 weeks.  FDLMP: 24.  Sexually active, no problems, monogamous.  Declines contraception.    Has noted a nonpainful right breast lump--x 2 weeks--mobile area.  Breastfeeding  History is positive for left ovarian cyst.  Denies known history of pelvic infection, uterine fibroid and endometriosis.    Family history is positive for autoimmune issues, joint pain, arthritis, etc.  Mother was diagnosed with breast cancer at age 53.      Review of Systems   Constitutional: Negative.  Negative for activity change, appetite change, chills, fatigue, fever and unexpected weight change.   HENT:  Sneezing: prolonged cycle.    Respiratory:  Negative for shortness of breath.    Cardiovascular:  Negative for chest pain.   Gastrointestinal:  Negative for abdominal distention, abdominal pain, constipation, diarrhea, nausea and vomiting.   Endocrine: Negative for cold intolerance and heat intolerance.   Genitourinary:  Positive for menstrual problem. Negative for difficulty urinating, dyspareunia, dysuria, frequency, genital sores, hematuria, pelvic pain, vaginal bleeding, vaginal discharge and vaginal pain.   Skin:  Negative for rash.   Neurological:  Negative for headaches.

## 2024-04-02 ENCOUNTER — TELEPHONE (OUTPATIENT)
Dept: OBGYN CLINIC | Age: 34
End: 2024-04-02

## 2024-04-02 DIAGNOSIS — N63.0 BREAST NODULE: Primary | ICD-10-CM

## 2024-04-02 NOTE — TELEPHONE ENCOUNTER
PT states she needs a referral to get a breast exam due to rt breast lump and family history of breast cancer. Please advise.

## 2024-04-08 NOTE — TELEPHONE ENCOUNTER
See office notes.   Order for right breast ultrasound placed.   Referral to breast surgeon/breast specialist placed as well.   Thanks

## 2024-04-18 ENCOUNTER — HOSPITAL ENCOUNTER (OUTPATIENT)
Dept: WOMENS IMAGING | Age: 34
Discharge: HOME | End: 2024-04-18
Payer: OTHER GOVERNMENT

## 2024-04-18 VITALS — WEIGHT: 135 LBS | HEIGHT: 66 IN | BODY MASS INDEX: 21.69 KG/M2

## 2024-04-18 DIAGNOSIS — N63.0 BREAST NODULE: ICD-10-CM

## 2024-04-18 PROCEDURE — G0279 TOMOSYNTHESIS, MAMMO: HCPCS

## 2024-04-18 PROCEDURE — 76642 ULTRASOUND BREAST LIMITED: CPT

## 2024-08-03 ENCOUNTER — HOSPITAL ENCOUNTER (EMERGENCY)
Age: 34
Discharge: HOME | End: 2024-08-03
Payer: OTHER GOVERNMENT

## 2024-08-03 ENCOUNTER — APPOINTMENT (OUTPATIENT)
Dept: CT IMAGING | Age: 34
End: 2024-08-03
Payer: OTHER GOVERNMENT

## 2024-08-03 VITALS
WEIGHT: 128 LBS | SYSTOLIC BLOOD PRESSURE: 136 MMHG | HEART RATE: 78 BPM | DIASTOLIC BLOOD PRESSURE: 81 MMHG | OXYGEN SATURATION: 98 % | RESPIRATION RATE: 18 BRPM | BODY MASS INDEX: 20.57 KG/M2 | TEMPERATURE: 97.6 F | HEIGHT: 66 IN

## 2024-08-03 DIAGNOSIS — R10.32 ABDOMINAL PAIN, LEFT LOWER QUADRANT: Primary | ICD-10-CM

## 2024-08-03 DIAGNOSIS — Z87.19 HISTORY OF CROHN'S DISEASE: ICD-10-CM

## 2024-08-03 LAB
ALBUMIN SERPL-MCNC: 4.4 G/DL (ref 3.4–5)
ALBUMIN/GLOB SERPL: 1 {RATIO} (ref 1.1–2.2)
ALP SERPL-CCNC: 70 U/L (ref 40–129)
ALT SERPL-CCNC: 16 U/L (ref 10–40)
ANION GAP SERPL CALCULATED.3IONS-SCNC: 8 MMOL/L (ref 3–16)
AST SERPL-CCNC: 21 U/L (ref 15–37)
BASOPHILS # BLD: 0 K/UL (ref 0–0.2)
BASOPHILS NFR BLD: 0.7 %
BILIRUB SERPL-MCNC: 0.3 MG/DL (ref 0–1)
BILIRUB UR QL STRIP.AUTO: NEGATIVE
BUN SERPL-MCNC: 13 MG/DL (ref 7–20)
CALCIUM SERPL-MCNC: 9.3 MG/DL (ref 8.3–10.6)
CHLORIDE SERPL-SCNC: 103 MMOL/L (ref 99–110)
CLARITY UR: CLEAR
CO2 SERPL-SCNC: 27 MMOL/L (ref 21–32)
COLOR UR: YELLOW
CREAT SERPL-MCNC: 0.7 MG/DL (ref 0.6–1.1)
DEPRECATED RDW RBC AUTO: 12.5 % (ref 12.4–15.4)
EOSINOPHIL # BLD: 0.1 K/UL (ref 0–0.6)
EOSINOPHIL NFR BLD: 1.4 %
GFR SERPLBLD CREATININE-BSD FMLA CKD-EPI: >90 ML/MIN/{1.73_M2}
GLUCOSE SERPL-MCNC: 92 MG/DL (ref 70–99)
GLUCOSE UR STRIP.AUTO-MCNC: NEGATIVE MG/DL
HCG UR QL: NEGATIVE
HCT VFR BLD AUTO: 34 % (ref 36–48)
HGB BLD-MCNC: 11.4 G/DL (ref 12–16)
HGB UR QL STRIP.AUTO: NEGATIVE
KETONES UR STRIP.AUTO-MCNC: NEGATIVE MG/DL
LEUKOCYTE ESTERASE UR QL STRIP.AUTO: NEGATIVE
LIPASE SERPL-CCNC: 30 U/L (ref 13–60)
LYMPHOCYTES # BLD: 1 K/UL (ref 1–5.1)
LYMPHOCYTES NFR BLD: 26.4 %
MCH RBC QN AUTO: 30.5 PG (ref 26–34)
MCHC RBC AUTO-ENTMCNC: 33.5 G/DL (ref 31–36)
MCV RBC AUTO: 91.2 FL (ref 80–100)
MONOCYTES # BLD: 0.4 K/UL (ref 0–1.3)
MONOCYTES NFR BLD: 11.2 %
NEUTROPHILS # BLD: 2.4 K/UL (ref 1.7–7.7)
NEUTROPHILS NFR BLD: 60.3 %
NITRITE UR QL STRIP.AUTO: NEGATIVE
PH UR STRIP.AUTO: 7 [PH] (ref 5–8)
PLATELET # BLD AUTO: 190 K/UL (ref 135–450)
PMV BLD AUTO: 8.5 FL (ref 5–10.5)
POTASSIUM SERPL-SCNC: 3.9 MMOL/L (ref 3.5–5.1)
PROT SERPL-MCNC: 8.8 G/DL (ref 6.4–8.2)
PROT UR STRIP.AUTO-MCNC: NEGATIVE MG/DL
RBC # BLD AUTO: 3.72 M/UL (ref 4–5.2)
SODIUM SERPL-SCNC: 138 MMOL/L (ref 136–145)
SP GR UR STRIP.AUTO: 1.01 (ref 1–1.03)
UA COMPLETE W REFLEX CULTURE PNL UR: NORMAL
UA DIPSTICK W REFLEX MICRO PNL UR: NORMAL
URN SPEC COLLECT METH UR: NORMAL
UROBILINOGEN UR STRIP-ACNC: 0.2 E.U./DL
WBC # BLD AUTO: 4 K/UL (ref 4–11)

## 2024-08-03 PROCEDURE — 84703 CHORIONIC GONADOTROPIN ASSAY: CPT

## 2024-08-03 PROCEDURE — 74177 CT ABD & PELVIS W/CONTRAST: CPT

## 2024-08-03 PROCEDURE — 81003 URINALYSIS AUTO W/O SCOPE: CPT

## 2024-08-03 PROCEDURE — 80053 COMPREHEN METABOLIC PANEL: CPT

## 2024-08-03 PROCEDURE — 6360000004 HC RX CONTRAST MEDICATION: Performed by: PHYSICIAN ASSISTANT

## 2024-08-03 PROCEDURE — 99285 EMERGENCY DEPT VISIT HI MDM: CPT

## 2024-08-03 PROCEDURE — 85025 COMPLETE CBC W/AUTO DIFF WBC: CPT

## 2024-08-03 PROCEDURE — 83690 ASSAY OF LIPASE: CPT

## 2024-08-03 RX ADMIN — IOPAMIDOL 75 ML: 755 INJECTION, SOLUTION INTRAVENOUS at 14:32

## 2024-08-03 ASSESSMENT — PAIN - FUNCTIONAL ASSESSMENT: PAIN_FUNCTIONAL_ASSESSMENT: 0-10

## 2024-08-03 ASSESSMENT — PAIN DESCRIPTION - DESCRIPTORS: DESCRIPTORS: ACHING

## 2024-08-03 ASSESSMENT — PAIN DESCRIPTION - PAIN TYPE: TYPE: ACUTE PAIN

## 2024-08-03 ASSESSMENT — PAIN DESCRIPTION - ORIENTATION: ORIENTATION: LEFT;LOWER

## 2024-08-03 ASSESSMENT — PAIN SCALES - GENERAL: PAINLEVEL_OUTOF10: 3

## 2024-08-03 ASSESSMENT — PAIN DESCRIPTION - LOCATION: LOCATION: ABDOMEN

## 2024-08-03 ASSESSMENT — PAIN DESCRIPTION - FREQUENCY: FREQUENCY: CONTINUOUS

## 2024-08-03 NOTE — ED PROVIDER NOTES
[facility]  ED  EMERGENCY DEPARTMENT ENCOUNTER        Pt Name: Mary Valdovinos  MRN: [id number]  Birthdate 1990  Date of evaluation: 8/3/2024  Provider: ИВАН SHI PA-C  PCP: No primary care provider on file.  ED Attending: MD Ranjit      AMY. I have evaluated this patient.      CHIEF COMPLAINT:     Chief Complaint   Patient presents with    Abdominal Pain     Patient to the ED for abdominal pain. LLQ that started a few weeks ago. Reports she has a hx of chron's but doesn't think that is it. Report hx of constipation as well.        HISTORY OF PRESENT ILLNESS:      History provided by the patient. No limitations.    Mary Valdovinos is a 34 y.o. female who arrives to the ED by private vehicle.  Patient is here complaining of left lower quadrant pain that started over a week ago.  She denies fevers, nausea or vomiting.  She deals with chronic constipation and has known Crohn's disease.  Nothing is changed in that respect.  She denies urinary symptoms.  She denies vaginal discharge or concern for pelvic infection.  She states she was first diagnosed with Crohn's in 2020 and has taken a holistic approach.  She has not seen a GI provider in years but has scheduled an appointment with [facility] in September.  Based on her ongoing symptoms she felt she should be checked.  Pain is rated just 3/10 and described as \"aching\".  She states she is not sure if this is bowel related, related to Crohn's or even possibly her ovary.    Nursing Notes were reviewed     REVIEW OF SYSTEMS:     Review of Systems  Positives and pertinent negatives as per HPI.      PAST MEDICAL HISTORY:     Past Medical History:   Diagnosis Date    Abnormal Pap smear of cervix     Anemia affecting pregnancy, antepartum 2/27/2022    Autoimmune disorder (HCC)     Crohn's disease (HCC)     Gestational diabetes 2018    Sjogren's syndrome (HCC)        SURGICAL HISTORY:      Past Surgical History:   Procedure Laterality Date  iterative reconstruction, and/or weight based adjustment of the mA/kV was utilized to reduce the radiation dose to as low as reasonably achievable. COMPARISON: 09/08/2020 HISTORY: ORDERING SYSTEM PROVIDED HISTORY: LLQ pain TECHNOLOGIST PROVIDED HISTORY: Reason for exam:->LLQ pain Additional Contrast?->None Decision Support Exception - unselect if not a suspected or confirmed emergency medical condition->Emergency Medical Condition (MA) Reason for Exam: LLQ pain x 1 week,radiates to her back Additional signs and symptoms: bladder pressure,constipation Relevant Medical/Surgical History: hx of Crohn's FINDINGS: Lower Chest: Lung bases are clear. Organs: The liver, gallbladder, pancreas, spleen and adrenal glands are without acute focal abnormality.  No renal or ureteral calculus.  No hydronephrosis or perinephric stranding.  The kidneys enhance symmetrically. GI/Bowel: No dilated loops of bowel or bowel wall thickening.  No free air. Moderate amount of stool in the colon.  The appendix is normal. Pelvis: The uterus and ovaries are unremarkable for a patient of this age. No pathologically enlarged adenopathy or free fluid.  No bladder wall thickening. Peritoneum/Retroperitoneum: The aorta is normal caliber.  The celiac axis, SMA and LUDIVINA are patent.  The portal venous system is patent.  No pathologically enlarged adenopathy.  No ascites or drainable fluid collection. Bones/Soft Tissues: No acute findings in the bones or soft tissues.     No acute abdominal or pelvic abnormality.          PROCEDURES:   N/A      CRITICAL CARE TIME:     None        EMERGENCY DEPARTMENT COURSE and DIFFERENTIAL DIAGNOSIS/MDM:   Vitals:    Vitals:    08/03/24 1346   BP: 136/81   Pulse: 78   Resp: 18   Temp: 97.6 °F (36.4 °C)   TempSrc: Oral   SpO2: 98%   Weight: 58.1 kg (128 lb)   Height: 1.676 m (5' 6\")       Patient was given the following medications:  None     CC/HPI Summary, DDx, ED course, and Reassessment: Patient here complaining of

## 2024-09-17 ENCOUNTER — HOSPITAL ENCOUNTER (OUTPATIENT)
Age: 34
Discharge: HOME | End: 2024-09-17
Payer: OTHER GOVERNMENT

## 2024-09-17 LAB
25(OH)D3 SERPL-MCNC: 34.6 NG/ML
CRP SERPL-MCNC: <3 MG/L (ref 0–5.1)
ERYTHROCYTE [SEDIMENTATION RATE] IN BLOOD BY WESTERGREN METHOD: 25 MM/HR (ref 0–20)
IRON SATN MFR SERPL: 27 % (ref 15–50)
IRON SERPL-MCNC: 71 UG/DL (ref 37–145)
TIBC SERPL-MCNC: 267 UG/DL (ref 260–445)
VIT B12 SERPL-MCNC: 676 PG/ML (ref 211–911)

## 2024-09-17 PROCEDURE — 86140 C-REACTIVE PROTEIN: CPT

## 2024-09-17 PROCEDURE — 82607 VITAMIN B-12: CPT

## 2024-09-17 PROCEDURE — 83540 ASSAY OF IRON: CPT

## 2024-09-17 PROCEDURE — 36415 COLL VENOUS BLD VENIPUNCTURE: CPT

## 2024-09-17 PROCEDURE — 85652 RBC SED RATE AUTOMATED: CPT

## 2024-09-17 PROCEDURE — 83550 IRON BINDING TEST: CPT

## 2024-09-17 PROCEDURE — 82306 VITAMIN D 25 HYDROXY: CPT

## 2025-03-12 ENCOUNTER — PATIENT MESSAGE (OUTPATIENT)
Dept: OBGYN CLINIC | Age: 35
End: 2025-03-12

## 2025-03-12 DIAGNOSIS — R92.8 ABNORMAL MAMMOGRAM: Primary | ICD-10-CM

## 2025-05-21 ENCOUNTER — RESULTS FOLLOW-UP (OUTPATIENT)
Dept: OBGYN CLINIC | Age: 35
End: 2025-05-21

## 2025-06-11 ENCOUNTER — INITIAL PRENATAL (OUTPATIENT)
Dept: OBGYN CLINIC | Age: 35
End: 2025-06-11

## 2025-06-11 ENCOUNTER — HOSPITAL ENCOUNTER (OUTPATIENT)
Dept: PHYSICAL THERAPY | Age: 35
Setting detail: THERAPIES SERIES
Discharge: HOME | End: 2025-06-11
Attending: OBSTETRICS & GYNECOLOGY
Payer: OTHER GOVERNMENT

## 2025-06-11 VITALS
SYSTOLIC BLOOD PRESSURE: 120 MMHG | WEIGHT: 126.8 LBS | HEART RATE: 86 BPM | BODY MASS INDEX: 20.47 KG/M2 | DIASTOLIC BLOOD PRESSURE: 58 MMHG

## 2025-06-11 DIAGNOSIS — M35.00 SJOGREN'S SYNDROME, WITH UNSPECIFIED ORGAN INVOLVEMENT: ICD-10-CM

## 2025-06-11 DIAGNOSIS — K50.919 CROHN'S DISEASE WITH COMPLICATION, UNSPECIFIED GASTROINTESTINAL TRACT LOCATION (HCC): ICD-10-CM

## 2025-06-11 DIAGNOSIS — N81.89 PELVIC FLOOR WEAKNESS: Primary | ICD-10-CM

## 2025-06-11 DIAGNOSIS — Z34.81 PRENATAL CARE, SUBSEQUENT PREGNANCY IN FIRST TRIMESTER: ICD-10-CM

## 2025-06-11 DIAGNOSIS — Z88.0 PENICILLIN ALLERGY: ICD-10-CM

## 2025-06-11 DIAGNOSIS — Z86.32 HISTORY OF GESTATIONAL DIABETES: ICD-10-CM

## 2025-06-11 DIAGNOSIS — R10.2 PELVIC PAIN: ICD-10-CM

## 2025-06-11 DIAGNOSIS — Z34.81 PRENATAL CARE, SUBSEQUENT PREGNANCY IN FIRST TRIMESTER: Primary | ICD-10-CM

## 2025-06-11 DIAGNOSIS — N39.3 SUI (STRESS URINARY INCONTINENCE, FEMALE): ICD-10-CM

## 2025-06-11 DIAGNOSIS — R19.8 ABDOMINAL WEAKNESS: ICD-10-CM

## 2025-06-11 DIAGNOSIS — Z98.890 S/P CONIZATION OF CERVIX: ICD-10-CM

## 2025-06-11 DIAGNOSIS — M62.08 SEPARATION OF ABDOMINAL MUSCLES: ICD-10-CM

## 2025-06-11 LAB
BASOPHILS # BLD: 0 K/UL (ref 0–0.2)
BASOPHILS NFR BLD: 0.3 %
DEPRECATED RDW RBC AUTO: 13.6 % (ref 12.4–15.4)
EOSINOPHIL # BLD: 0 K/UL (ref 0–0.6)
EOSINOPHIL NFR BLD: 0.6 %
HBV SURFACE AG SERPL QL IA: NORMAL
HCT VFR BLD AUTO: 32.4 % (ref 36–48)
HGB BLD-MCNC: 11.3 G/DL (ref 12–16)
LYMPHOCYTES # BLD: 0.4 K/UL (ref 1–5.1)
LYMPHOCYTES NFR BLD: 12.1 %
MCH RBC QN AUTO: 30.4 PG (ref 26–34)
MCHC RBC AUTO-ENTMCNC: 34.7 G/DL (ref 31–36)
MCV RBC AUTO: 87.7 FL (ref 80–100)
MONOCYTES # BLD: 0.2 K/UL (ref 0–1.3)
MONOCYTES NFR BLD: 6.8 %
NEUTROPHILS # BLD: 2.8 K/UL (ref 1.7–7.7)
NEUTROPHILS NFR BLD: 80.2 %
PLATELET # BLD AUTO: 168 K/UL (ref 135–450)
PMV BLD AUTO: 9.2 FL (ref 5–10.5)
RBC # BLD AUTO: 3.7 M/UL (ref 4–5.2)
RUBV IGG SERPL IA-ACNC: 0.3 IU/ML
TSH SERPL DL<=0.005 MIU/L-ACNC: 1.34 UIU/ML (ref 0.27–4.2)
WBC # BLD AUTO: 3.5 K/UL (ref 4–11)

## 2025-06-11 PROCEDURE — 97140 MANUAL THERAPY 1/> REGIONS: CPT

## 2025-06-11 PROCEDURE — 97530 THERAPEUTIC ACTIVITIES: CPT

## 2025-06-11 PROCEDURE — 97161 PT EVAL LOW COMPLEX 20 MIN: CPT

## 2025-06-11 PROCEDURE — 0500F INITIAL PRENATAL CARE VISIT: CPT | Performed by: OBSTETRICS & GYNECOLOGY

## 2025-06-11 NOTE — PLAN OF CARE
[facility] - Outpatient Rehabilitation and Therapy: [address] [place] [address] office: [phone] fax: [phone]     Physical Therapy Initial Evaluation Certification    Dear Mandie Pacheco DO,    We had the pleasure of evaluating the following patient for physical therapy services at [facility].  A summary of our findings can be found in the initial assessment below.  This includes our plan of care.  If you have any questions or concerns regarding these findings, please do not hesitate to contact me at the office phone number listed above.  Thank you for the referral.     Physician Signature:_______________________________Date:__________________  By signing above (or electronic signature), therapist’s plan is approved by physician       Physical Therapy: TREATMENT/PROGRESS NOTE   Patient: Mary Valdovinos (35 y.o. female)   Examination Date: 2025   :  1990 MRN: [id number]   Visit #: 1   Insurance Allowable Auth Needed   [place] []Yes    [x]No    Insurance: Payor:  EAST / Plan:  EAST SELECT / Product Type: *No Product type* /   Insurance ID: [id number] - (Other)  Secondary Insurance (if applicable):    Treatment Diagnosis:     ICD-10-CM    1. Pelvic floor weakness  N81.89       2. Abdominal weakness  R19.8       3. Separation of abdominal muscles  M62.08       4. NICK (stress urinary incontinence, female)  N39.3       5. Pelvic pain  R10.2          Medical Diagnosis:  Pelvic floor instability [M62.89]   Referring Physician: Mandie Pacheco, *  PCP: No primary care provider on file.   Plan of care signed (Y/N): N    Date of Patient follow up with Physician: 25     Plan of Care Report: EVAL today  POC update due: (10 visits /OR AUTH LIMITS, whichever is less)  2025                                             Medical History:  Comorbidities:  Pregnancy  Other: sjorgen's disease, crohn's disease  Relevant

## 2025-06-12 ENCOUNTER — RESULTS FOLLOW-UP (OUTPATIENT)
Dept: OBGYN | Age: 35
End: 2025-06-12

## 2025-06-12 LAB
25(OH)D3 SERPL-MCNC: 36.9 NG/ML
ABO/RH: NORMAL
AMPHETAMINES UR QL SCN>1000 NG/ML: NORMAL
ANTIBODY SCREEN: NORMAL
BARBITURATES UR QL SCN>200 NG/ML: NORMAL
BENZODIAZ UR QL SCN>200 NG/ML: NORMAL
BUPRENORPHINE+NOR UR QL SCN: NORMAL
CANNABINOIDS UR QL SCN>50 NG/ML: NORMAL
COCAINE UR QL SCN: NORMAL
DRUG SCREEN COMMENT UR-IMP: NORMAL
ENA SS-A AB SER IA-ACNC: >8 AI (ref 0–0.9)
ENA SS-B AB SER IA-ACNC: >8 AI (ref 0–0.9)
EST. AVERAGE GLUCOSE BLD GHB EST-MCNC: 96.8 MG/DL
FENTANYL SCREEN, URINE: NORMAL
FOLATE SERPL-MCNC: 8.83 NG/ML (ref 4.78–24.2)
HBA1C MFR BLD: 5 %
HIV 1+2 AB+HIV1 P24 AG SERPL QL IA: NORMAL
HIV 2 AB SERPL QL IA: NORMAL
HIV1 AB SERPL QL IA: NORMAL
HIV1 P24 AG SERPL QL IA: NORMAL
METHADONE UR QL SCN>300 NG/ML: NORMAL
OPIATES UR QL SCN>300 NG/ML: NORMAL
OXYCODONE UR QL SCN: NORMAL
PCP UR QL SCN>25 NG/ML: NORMAL
PH UR STRIP: 8 [PH]
VIT B12 SERPL-MCNC: 551 PG/ML (ref 211–911)
VZV IGG SER QL IA: NORMAL

## 2025-06-15 LAB
HCV AB S/CO SERPL IA: 0.12 IV
HCV AB SERPL QL IA: NEGATIVE

## 2025-06-18 ENCOUNTER — APPOINTMENT (OUTPATIENT)
Dept: PHYSICAL THERAPY | Age: 35
End: 2025-06-18
Attending: OBSTETRICS & GYNECOLOGY
Payer: OTHER GOVERNMENT

## 2025-06-18 PROBLEM — Z34.81 PRENATAL CARE, SUBSEQUENT PREGNANCY IN FIRST TRIMESTER: Status: ACTIVE | Noted: 2025-06-18

## 2025-06-18 NOTE — PROGRESS NOTES
34 y/o  female  at 10 weeks 2 days gestation with EDC 26 presents for initial prenatal visit.   FDLMP: 24.    Pregnancy is complicated by Sjogrens (23: anti-SS-A IgG positive, anti-SS-B IgG positive), Crohns, short interval pregnancy, history of gestational diabetes (2nd pregnancy), and history of cervical conization.    Has completed 2 pregnancies since conization.     Patient is 18 months s/p  at 40 weeks 4 days gestation.    Breastfeeding--daily.  Sees [facility] for primary care.  Crohns and autoimmune issues have been stable.  Patient has seen [facility] in the past.  Patient to consider re-referral--declines today. Previous recommendations reviewed.   Sjogren's was diagnosed in 2018 after symptoms were first noted in 2017.   Recommendations from [facility] reviewed:    Sjogren's disease  I discussed with the patient the implications of Sjogren's disease in pregnancy. Maternal complications related to autoimmune conditions in pregnancy, such as SLE and Sjogren's (which have some clinical overlap) were reviewed.   Pregnancy is not thought to increase the likelihood of flares, though some patients have increased flares in the postpartum period. These conditions may increase the risks of certain fetal complications as well. 1/3 of patients with Lupus have Anti-Ro or Anti-La antibodies, with higher rates in women with Sjogrens. These have been associated with the  Lupus syndrome and congenital fetal heart block. In most cases, congenital heart block develops between 18 and 24 weeks of gestation.   Therefore, women who have antibodies to Ro/SSA and/or La/SSB should undergo increased fetal surveillance for heart block. At our center, we recommend referral for the initial fetal echo at [facility] at 16 weeks, with frequency intervals typically every 2 weeks thereafter, individualized based on the antibody levels. While there is no therapeutic intervention proven to prevent progression, early

## 2025-06-19 LAB — T PALLIDUM IGG SER QL IF: NON REACTIVE

## 2025-06-25 ENCOUNTER — HOSPITAL ENCOUNTER (OUTPATIENT)
Dept: PHYSICAL THERAPY | Age: 35
Setting detail: THERAPIES SERIES
Discharge: HOME | End: 2025-06-25
Attending: OBSTETRICS & GYNECOLOGY
Payer: OTHER GOVERNMENT

## 2025-06-25 PROCEDURE — 97140 MANUAL THERAPY 1/> REGIONS: CPT

## 2025-06-25 PROCEDURE — 97110 THERAPEUTIC EXERCISES: CPT

## 2025-06-25 NOTE — FLOWSHEET NOTE
[facility] - Outpatient Rehabilitation and Therapy: [address] [place] [address] office: [phone] fax: [phone]    Physical Therapy: TREATMENT/PROGRESS NOTE   Patient: Mary Valdovinos (35 y.o. female)   Examination Date: 2025   :  1990 MRN: [id number]   Visit #: 2   Insurance Allowable Auth Needed   MN []Yes    [x]No    Insurance: Payor:  EAST / Plan:  EAST SELECT / Product Type: *No Product type* /   Insurance ID: [id number] - (Other)  Secondary Insurance (if applicable):    Treatment Diagnosis:     ICD-10-CM    1. Pelvic floor weakness  N81.89       2. Abdominal weakness  R19.8       3. Separation of abdominal muscles  M62.08       4. NICK (stress urinary incontinence, female)  N39.3       5. Pelvic pain  R10.2          Medical Diagnosis:  Pelvic floor instability [M62.89]   Referring Physician: Mandie Pacheco, *  PCP: No primary care provider on file.   Plan of care signed (Y/N): N    Date of Patient follow up with Physician: 25     Plan of Care Report: NO  POC update due: (10 visits /OR AUTH LIMITS, whichever is less)  2025                                             Medical History:  Comorbidities:  Pregnancy  Other: sjorgen's disease, crohn's disease  Relevant Medical History: see chart                                          Precautions/ Contra-indications:           Latex allergy:  NO  Pacemaker:    NO  Contraindications for Manipulation: NA and pregnancy (relative)      Red Flags:  None    Suicide Screening:   The patient did not verbalize a primary behavioral concern, suicidal ideation, suicidal intent, or demonstrate suicidal behaviors.    Preferred Language for Healthcare:   [x] English       [] other:    SUBJECTIVE EXAMINATION     Patient stated complaint/comments: Got dilator yesterday, has not had a chance to use them yet. Ulysses fine after last visit.     From eval 25 Patient states that \"I definitely have a prolapse

## 2025-07-02 ENCOUNTER — HOSPITAL ENCOUNTER (OUTPATIENT)
Dept: PHYSICAL THERAPY | Age: 35
Setting detail: THERAPIES SERIES
Discharge: HOME | End: 2025-07-02
Attending: OBSTETRICS & GYNECOLOGY
Payer: OTHER GOVERNMENT

## 2025-07-02 PROCEDURE — 97110 THERAPEUTIC EXERCISES: CPT

## 2025-07-02 PROCEDURE — 97140 MANUAL THERAPY 1/> REGIONS: CPT

## 2025-07-02 PROCEDURE — 97530 THERAPEUTIC ACTIVITIES: CPT

## 2025-07-02 NOTE — FLOWSHEET NOTE
([id number])    Group Therapy (21479)     Estim Attended (73534)    Canalith Repositioning (24536)     Physical Performance Test (77914)    Custom orthotic ()     Other:    Other:    Total Timed Code Tx Minutes 38 2       Total Treatment Minutes 38        Charge Justification:  (88502) THERAPEUTIC EXERCISE - Provided verbal/tactile cueing for HEP and/or activities related to strengthening, flexibility, endurance, ROM performed to prevent loss of range of motion, maintain or improve muscular strength or increase flexibility, following either an injury or surgery.   (76345) THERAPEUTIC ACTIVITY - use of dynamic activities to improve functional performance. (Ex include squatting, ascending/descending stairs, walking, bending, lifting, catching, throwing, pushing, pulling, jumping.)  Direct, one on one contact, billed in 15-minute increments.  (33228) MANUAL THERAPY -  Manual therapy techniques, 1 or more regions, each 15 minutes (Mobilization/manipulation, manual lymphatic drainage, manual traction) for the purpose of modulating pain, promoting relaxation,  increasing ROM, reducing/eliminating soft tissue swelling/inflammation/restriction, improving soft tissue extensibility and allowing for proper ROM for normal function with self care, mobility, lifting and ambulation      GOALS     Patient stated goal: \"not have any pain and not leak\"  [] Progressing: [] Met: [] Not Met: [] Adjusted      Therapist goals for Patient:   Short Term Goals: To be achieved in: 2 weeks  1. Independent in HEP and progression per patient tolerance, in order to prevent future occurrence of presenting issue.  [] Progressing: [] Met: [] Not Met: [] Adjusted  2. Patient will have a decrease in urination to indicate improvement in pelvic floor strength and relaxation, muscle coordination, and/or bladder retraining.  [] Progressing: [] Met: [] Not Met: [] Adjusted    Long Term Goals: To be achieved in: 8 weeks  1. Disability index score of 12% or

## 2025-07-09 ENCOUNTER — APPOINTMENT (OUTPATIENT)
Dept: PHYSICAL THERAPY | Age: 35
End: 2025-07-09
Attending: OBSTETRICS & GYNECOLOGY
Payer: OTHER GOVERNMENT

## 2025-07-09 ENCOUNTER — ROUTINE PRENATAL (OUTPATIENT)
Dept: OBGYN CLINIC | Age: 35
End: 2025-07-09

## 2025-07-09 VITALS
DIASTOLIC BLOOD PRESSURE: 64 MMHG | SYSTOLIC BLOOD PRESSURE: 98 MMHG | WEIGHT: 128.4 LBS | BODY MASS INDEX: 20.72 KG/M2 | HEART RATE: 80 BPM

## 2025-07-09 DIAGNOSIS — Z88.0 PENICILLIN ALLERGY: ICD-10-CM

## 2025-07-09 DIAGNOSIS — M35.00 SJOGREN'S SYNDROME, WITH UNSPECIFIED ORGAN INVOLVEMENT: ICD-10-CM

## 2025-07-09 DIAGNOSIS — Z34.82 PRENATAL CARE, SUBSEQUENT PREGNANCY IN SECOND TRIMESTER: Primary | ICD-10-CM

## 2025-07-09 DIAGNOSIS — Z86.32 HISTORY OF GESTATIONAL DIABETES: ICD-10-CM

## 2025-07-09 DIAGNOSIS — K50.919 CROHN'S DISEASE WITH COMPLICATION, UNSPECIFIED GASTROINTESTINAL TRACT LOCATION (HCC): ICD-10-CM

## 2025-07-09 DIAGNOSIS — Z98.890 S/P CONIZATION OF CERVIX: ICD-10-CM

## 2025-07-09 PROCEDURE — 0502F SUBSEQUENT PRENATAL CARE: CPT | Performed by: OBSTETRICS & GYNECOLOGY

## 2025-07-16 ENCOUNTER — APPOINTMENT (OUTPATIENT)
Dept: PHYSICAL THERAPY | Age: 35
End: 2025-07-16
Attending: OBSTETRICS & GYNECOLOGY
Payer: OTHER GOVERNMENT

## 2025-07-17 PROBLEM — Z34.82 PRENATAL CARE, SUBSEQUENT PREGNANCY IN SECOND TRIMESTER: Status: ACTIVE | Noted: 2025-06-18

## 2025-07-17 NOTE — PROGRESS NOTES
36 y/o  female  at 14 weeks 2 days gestation with EDC 26 presents for prenatal visit.   Since last visit patient looked into birthing center option at [facility]--looking for least testing and  intervention.    Has decided to deliver at [facility].  States the testing and interventions recommended with previous pregnancy would most likely be recommended at the birthing center as well.    FDLMP: 24.    Pregnancy is complicated by Sjogrens (23: anti-SS-A IgG positive, anti-SS-B IgG positive), Crohns, short interval pregnancy, history of gestational diabetes (2nd pregnancy), and history of cervical conization.    Has completed 2 pregnancies since conization.     Patient is 19 months s/p  at 40 weeks 4 days gestation.    Breastfeeding--daily--1 x per day.  Sees [facility] for primary care.  Crohns and autoimmune issues have been stable.  Patient has seen MFM in the past.  Patient to consider re-referral--declines. Previous recommendations reviewed.   Sjogren's was diagnosed in 2018 after symptoms were first noted in 2017.   Recommendations from [facility] reviewed:    Sjogren's disease  I discussed with the patient the implications of Sjogren's disease in pregnancy. Maternal complications related to autoimmune conditions in pregnancy, such as SLE and Sjogren's (which have some clinical overlap) were reviewed.   Pregnancy is not thought to increase the likelihood of flares, though some patients have increased flares in the postpartum period. These conditions may increase the risks of certain fetal complications as well. 1/3 of patients with Lupus have Anti-Ro or Anti-La antibodies, with higher rates in women with Sjogrens. These have been associated with the  Lupus syndrome and congenital fetal heart block. In most cases, congenital heart block develops between 18 and 24 weeks of gestation.   Therefore, women who have antibodies to Ro/SSA and/or La/SSB should undergo increased fetal

## 2025-08-22 ENCOUNTER — ROUTINE PRENATAL (OUTPATIENT)
Dept: OBGYN CLINIC | Age: 35
End: 2025-08-22

## 2025-08-22 VITALS
HEART RATE: 96 BPM | BODY MASS INDEX: 22.08 KG/M2 | WEIGHT: 136.8 LBS | DIASTOLIC BLOOD PRESSURE: 62 MMHG | SYSTOLIC BLOOD PRESSURE: 97 MMHG

## 2025-08-22 DIAGNOSIS — K50.919 CROHN'S DISEASE WITH COMPLICATION, UNSPECIFIED GASTROINTESTINAL TRACT LOCATION (HCC): ICD-10-CM

## 2025-08-22 DIAGNOSIS — Z87.410 HISTORY OF CERVICAL DYSPLASIA: ICD-10-CM

## 2025-08-22 DIAGNOSIS — M35.00 SJOGREN'S SYNDROME, WITH UNSPECIFIED ORGAN INVOLVEMENT: ICD-10-CM

## 2025-08-22 DIAGNOSIS — Z86.32 HISTORY OF GESTATIONAL DIABETES: ICD-10-CM

## 2025-08-22 DIAGNOSIS — Z98.890 S/P CONIZATION OF CERVIX: ICD-10-CM

## 2025-08-22 DIAGNOSIS — Z34.82 PRENATAL CARE, SUBSEQUENT PREGNANCY IN SECOND TRIMESTER: Primary | ICD-10-CM

## 2025-08-22 DIAGNOSIS — Z88.0 PENICILLIN ALLERGY: ICD-10-CM

## 2025-08-22 PROCEDURE — 0502F SUBSEQUENT PRENATAL CARE: CPT | Performed by: OBSTETRICS & GYNECOLOGY

## (undated) DEVICE — INTENDED FOR TISSUE SEPARATION, AND OTHER PROCEDURES THAT REQUIRE A SHARP SURGICAL BLADE TO PUNCTURE OR CUT.: Brand: BARD-PARKER ® STAINLESS STEEL BLADES

## (undated) DEVICE — CATHETER IV 20GA L1.25IN PNK FEP SFTY STR HUB RADPQ DISP

## (undated) DEVICE — NEEDLE SPNL 20GA L3.5IN YEL HUB S STL REG WALL FIT STYL W/

## (undated) DEVICE — PROCTO SWABS: Brand: DEROYAL

## (undated) DEVICE — ELECTRODE PT RET AD L9FT HI MOIST COND ADH HYDRGEL CORDED

## (undated) DEVICE — GLOVE,SURG,SENSICARE,ALOE,LF,PF,7: Brand: MEDLINE

## (undated) DEVICE — GOWN,SIRUS,NON REINFRCD,LARGE,SET IN SL: Brand: MEDLINE

## (undated) DEVICE — 3M™ TEGADERM™ TRANSPARENT FILM DRESSING FRAME STYLE, 1624W, 2-3/8 IN X 2-3/4 IN (6 CM X 7 CM), 100/CT 4CT/CASE: Brand: 3M™ TEGADERM™

## (undated) DEVICE — SUTURE VCRL SZ 0 L36IN ABSRB UD L36MM CT-1 1/2 CIR J946H

## (undated) DEVICE — SOLUTION IV 1000ML LAC RINGERS PH 6.5 INJ USP VIAFLX PLAS

## (undated) DEVICE — BLADE ES L4IN INSUL EDGE

## (undated) DEVICE — SUTURE PERMA-HAND SZ 2-0 L30IN NONABSORBABLE BLK L26MM SH K833H

## (undated) DEVICE — INVIEW CLEAR LEGGINGS: Brand: CONVERTORS

## (undated) DEVICE — SYRINGE MED 10ML TRNSLUC BRL PLUNG BLK MRK POLYPR CTRL

## (undated) DEVICE — PENCIL ES L3M BTTN SWCH S STL HEX LOK BLDE ELECTRD HOLSTER

## (undated) DEVICE — DRAPE,UNDERBUTTOCKS,PCH,STERILE: Brand: MEDLINE

## (undated) DEVICE — SET ADMIN PRIMING 7ML L30IN 7.35LB 20 GTT 2ND RLER CLMP

## (undated) DEVICE — GLOVE SURG SZ 65 THK91MIL LTX FREE SYN POLYISOPRENE

## (undated) DEVICE — QUILTED PREMIUM COMFORT UNDERPAD,EXTRA HEAVY: Brand: WINGS

## (undated) DEVICE — PVC URETHRAL CATHETER: Brand: DOVER

## (undated) DEVICE — MINOR SET UP PK

## (undated) DEVICE — GLOVE SURG SZ 65 L12IN FNGR THK94MIL STD WHT LTX FREE

## (undated) DEVICE — TRAY PREP DRY W/ PREM GLV 2 APPL 6 SPNG 2 UNDPD 1 OVERWRAP

## (undated) DEVICE — SET GRAV VENT NVENT CK VLV 3 NDL FREE PRT 10 GTT

## (undated) DEVICE — TRAY PROC INTRACERVICAL LEEP DISPOSABLE REDIKIT

## (undated) DEVICE — SHEET,DRAPE,53X77,STERILE: Brand: MEDLINE

## (undated) DEVICE — PAD,NON-ADHERENT,3X8,STERILE,LF,1/PK: Brand: MEDLINE